# Patient Record
Sex: FEMALE | Race: WHITE | HISPANIC OR LATINO | Employment: UNEMPLOYED | URBAN - METROPOLITAN AREA
[De-identification: names, ages, dates, MRNs, and addresses within clinical notes are randomized per-mention and may not be internally consistent; named-entity substitution may affect disease eponyms.]

---

## 2021-03-24 ENCOUNTER — OFFICE VISIT (OUTPATIENT)
Dept: FAMILY MEDICINE CLINIC | Facility: CLINIC | Age: 31
End: 2021-03-24

## 2021-03-24 VITALS
DIASTOLIC BLOOD PRESSURE: 82 MMHG | WEIGHT: 134.8 LBS | RESPIRATION RATE: 18 BRPM | TEMPERATURE: 98.2 F | BODY MASS INDEX: 23.01 KG/M2 | OXYGEN SATURATION: 99 % | SYSTOLIC BLOOD PRESSURE: 130 MMHG | HEART RATE: 96 BPM | HEIGHT: 64 IN

## 2021-03-24 DIAGNOSIS — Z00.00 ANNUAL PHYSICAL EXAM: Primary | ICD-10-CM

## 2021-03-24 PROCEDURE — 99385 PREV VISIT NEW AGE 18-39: CPT | Performed by: FAMILY MEDICINE

## 2021-03-24 NOTE — Clinical Note
Freeman Blair- This is the pt I was talking about  She is Polish-speaking only and needs to schedule an appointment ASAP for nexplanon removal  She may also need a note for work for this appointment  I gave her one for tomorrow in the hopes it could be scheduled for then, but if not she may need a new note  Thank you!

## 2021-03-24 NOTE — PATIENT INSTRUCTIONS

## 2021-03-24 NOTE — LETTER
March 24, 2021     Patient: Farrukh Bassett [address] [place] [address]   YOB: 1990   Date of Visit: 3/24/2021       To Whom it May Concern:    Alyssa Meyers is under my professional care  She was seen in my office on 3/24/2021  She will need to be excused for another procedure appointment at some point within the next week  Hopefully to be scheduled on 3/25/2021  Please excuse her for this appointment  Thank you  If you have any questions or concerns, please don't hesitate to call           Sincerely,          Filemon Thurston MD        CC: No Recipients

## 2021-03-24 NOTE — PROGRESS NOTES
[address] [facility] [place]    NAME: Jeannie Bailon  AGE: 33 y o  SEX: female  : 1990     DATE: 3/24/2021     Assessment and Plan:     Problem List Items Addressed This Visit     None      Visit Diagnoses     Annual physical exam    -  Primary          Immunizations and preventive care screenings were discussed with patient today  Patient states she is up to date on vaccinations but unfortunately does not have previous records  Appropriate education was printed on patient's after visit summary  Counseling:  Alcohol/drug use: discussed moderation in alcohol intake, the recommendations for healthy alcohol use, and avoidance of illicit drug use  Dental Health: discussed importance of regular tooth brushing, flossing, and dental visits  Injury prevention: discussed safety/seat belts, safety helmets, smoke detectors, carbon dioxide detectors, and smoking near bedding or upholstery  Sexual health: discussed sexually transmitted diseases, partner selection, use of condoms, avoidance of unintended pregnancy, and contraceptive alternatives  · Exercise: the importance of regular exercise/physical activity was discussed  Recommend exercise 3-5 times per week for at least 30 minutes  Depression Screening and Follow-up Plan: Patient's depression screening was positive with a PHQ-2 score of 0  Clincally patient does not have depression  No treatment is required  No follow-ups on file  Chief Complaint:     No chief complaint on file  History of Present Illness:     Adult Annual Physical   New patient here for a comprehensive physical exam  She is Polish-speaking only   phone was used  The patient reports that she wants nexplanon removed  Has had it in for 3 years and the device is starting to hurt in her arm  Wants to start having children   Discussed that she will need to schedule a procedure appointment with a trained provider to have it removed  Pts hopes that this appointment can be made as soon as possible  Diet and Physical Activity  · Diet/Nutrition: well balanced diet  · Exercise: walking and 5-7 times a week on average  Depression Screening  PHQ-9 Depression Screening    PHQ-9:   Frequency of the following problems over the past two weeks:      Little interest or pleasure in doing things: 0 - not at all  Feeling down, depressed, or hopeless: 0 - not at all  PHQ-2 Score: 0       General Health  · Sleep: gets 7-8 hours of sleep on average  · Hearing: normal - bilateral   · Vision: no vision problems  · Dental: regular dental visits and brushes teeth twice daily  /GYN Health  · Last menstrual period: currently  Irregular, every 3-6 months since nexplanon placement   · Contraceptive method: nexplanon, wants it removed because wants to conceive   · History of STDs?: no      Review of Systems:     Review of Systems   Constitutional: Negative for activity change, appetite change and fever  HENT: Negative for congestion, rhinorrhea, sneezing and sore throat  Respiratory: Negative for cough, chest tightness and shortness of breath  Cardiovascular: Negative for chest pain, palpitations and leg swelling  Gastrointestinal: Negative for abdominal pain, diarrhea, nausea and vomiting  Genitourinary: Negative for difficulty urinating and dysuria  Musculoskeletal: Negative for back pain and myalgias  Skin: Negative for color change, rash and wound  Neurological: Negative for dizziness, light-headedness and headaches  Psychiatric/Behavioral: Negative for agitation and confusion  Past Medical History:     No past medical history on file  Past Surgical History:     No past surgical history on file  Social History:     No existing history information found  No existing history information found      Social History     Socioeconomic History    Marital status: /Civil Union     Spouse name: Not on file    Number of children: Not on file    Years of education: Not on file    Highest education level: Not on file   Occupational History    Not on file   Social Needs    Financial resource strain: Not on file    Food insecurity     Worry: Not on file     Inability: Not on file    Transportation needs     Medical: Not on file     Non-medical: Not on file   Tobacco Use    Smoking status: Not on file   Substance and Sexual Activity    Alcohol use: Not on file    Drug use: Not on file    Sexual activity: Not on file   Lifestyle    Physical activity     Days per week: Not on file     Minutes per session: Not on file    Stress: Not on file   Relationships    Social connections     Talks on phone: Not on file     Gets together: Not on file     Attends Pentecostalism service: Not on file     Active member of club or organization: Not on file     Attends meetings of clubs or organizations: Not on file     Relationship status: Not on file    Intimate partner violence     Fear of current or ex partner: Not on file     Emotionally abused: Not on file     Physically abused: Not on file     Forced sexual activity: Not on file   Other Topics Concern    Not on file   Social History Narrative    Not on file      Family History:     No family history on file  Current Medications:     No current outpatient medications on file  No current facility-administered medications for this visit  Allergies:     Not on File   Physical Exam:     /82 (BP Location: Left arm, Patient Position: Sitting, Cuff Size: Standard)   Pulse 96   Temp 98 2 °F (36 8 °C) (Temporal)   Resp 18   Ht 5' 3 7" (1 618 m)   Wt 61 1 kg (134 lb 12 8 oz)   SpO2 99%   BMI 23 36 kg/m²     Physical Exam  Vitals signs reviewed  Constitutional:       General: She is not in acute distress  Appearance: Normal appearance  She is normal weight   She is not ill-appearing or toxic-appearing  HENT:      Head: Normocephalic and atraumatic  Right Ear: External ear normal       Left Ear: External ear normal       Nose: Nose normal    Eyes:      Extraocular Movements: Extraocular movements intact  Conjunctiva/sclera: Conjunctivae normal    Neck:      Musculoskeletal: Normal range of motion and neck supple  Cardiovascular:      Rate and Rhythm: Normal rate and regular rhythm  Pulses: Normal pulses  Heart sounds: Normal heart sounds  No murmur  Pulmonary:      Effort: Pulmonary effort is normal  No respiratory distress  Breath sounds: Normal breath sounds  No wheezing  Abdominal:      General: Abdomen is flat  Bowel sounds are normal       Palpations: Abdomen is soft  Tenderness: There is no abdominal tenderness  Musculoskeletal: Normal range of motion  Right lower leg: No edema  Left lower leg: No edema  Skin:     General: Skin is warm and dry  Capillary Refill: Capillary refill takes less than 2 seconds  Findings: No rash  Neurological:      General: No focal deficit present  Mental Status: She is alert and oriented to person, place, and time  Mental status is at baseline  Psychiatric:         Mood and Affect: Mood normal          Behavior: Behavior normal          Thought Content:  Thought content normal          Patricia Mccurdy MD   [address]

## 2021-03-25 ENCOUNTER — TELEPHONE (OUTPATIENT)
Dept: FAMILY MEDICINE CLINIC | Facility: CLINIC | Age: 31
End: 2021-03-25

## 2021-03-25 ENCOUNTER — PROCEDURE VISIT (OUTPATIENT)
Dept: FAMILY MEDICINE CLINIC | Facility: CLINIC | Age: 31
End: 2021-03-25

## 2021-03-25 VITALS
HEART RATE: 98 BPM | RESPIRATION RATE: 18 BRPM | OXYGEN SATURATION: 99 % | DIASTOLIC BLOOD PRESSURE: 70 MMHG | SYSTOLIC BLOOD PRESSURE: 110 MMHG | TEMPERATURE: 97.8 F | WEIGHT: 134.6 LBS | BODY MASS INDEX: 22.98 KG/M2 | HEIGHT: 64 IN

## 2021-03-25 DIAGNOSIS — Z30.46 NEXPLANON REMOVAL: Primary | ICD-10-CM

## 2021-03-25 PROCEDURE — 24200 RMVL FB UPPER ARM/ELBW SUBQ: CPT | Performed by: FAMILY MEDICINE

## 2021-03-25 NOTE — PROGRESS NOTES
Universal Protocol:  Consent: Verbal consent obtained  Consent given by: patient  Time out: Immediately prior to procedure a "time out" was called to verify the correct patient, procedure, equipment, support staff and site/side marked as required  Timeout called at: 3/25/2021 2:50 PM   Patient identity confirmed: verbally with patient    Foreign body removal    Date/Time: 3/25/2021 3:08 PM  Performed by: Luz Marina Baird MD  Authorized by: Luz Marina Baird MD   Body area: skin  General location: upper extremity  Location details: left upper arm    Anesthesia:  Local Anesthetic: lidocaine 1% with epinephrine  Anesthetic total: 3 mL    Sedation:  Patient sedated: no  Patient cooperative: yes  Depth: subcutaneous  Complexity: simple  1 objects recovered  Objects recovered: 1 nexplanon implant, 4 cm in length  Post-procedure assessment: foreign body removed  Patient tolerance: patient tolerated the procedure well with no immediate complications      Vitals:    03/25/21 1508   BP: 110/70   Pulse: 98   Resp: 18   Temp: 97 8 °F (36 6 °C)   SpO2: 99%         LINDSEY Albarado  Family Medicine, PGY-3    Please excuse any "sound-alike" errors that may have ocurred during the process of dictation  Parts of this note have been dictated and there may be errors present in the transcription process  Thank you

## 2021-03-25 NOTE — TELEPHONE ENCOUNTER
----- Message from Jose Tate MD sent at 3/24/2021  2:22 PM EDT -----  Please schedule patient for procedure appointment for nexplanon removal as early as tomorrow

## 2021-07-20 ENCOUNTER — OFFICE VISIT (OUTPATIENT)
Dept: OBGYN CLINIC | Facility: CLINIC | Age: 31
End: 2021-07-20

## 2021-07-20 VITALS
HEART RATE: 82 BPM | SYSTOLIC BLOOD PRESSURE: 124 MMHG | DIASTOLIC BLOOD PRESSURE: 83 MMHG | WEIGHT: 136 LBS | BODY MASS INDEX: 23.22 KG/M2 | HEIGHT: 64 IN

## 2021-07-20 DIAGNOSIS — N76.0 ACUTE VAGINITIS: ICD-10-CM

## 2021-07-20 DIAGNOSIS — R30.0 DYSURIA: Primary | ICD-10-CM

## 2021-07-20 LAB
SL AMB  POCT GLUCOSE, UA: ABNORMAL
SL AMB LEUKOCYTE ESTERASE,UA: ABNORMAL
SL AMB POCT BILIRUBIN,UA: ABNORMAL
SL AMB POCT BLOOD,UA: ABNORMAL
SL AMB POCT CLARITY,UA: ABNORMAL
SL AMB POCT COLOR,UA: ABNORMAL
SL AMB POCT KETONES,UA: ABNORMAL
SL AMB POCT NITRITE,UA: ABNORMAL
SL AMB POCT PH,UA: 5
SL AMB POCT SPECIFIC GRAVITY,UA: 1.02
SL AMB POCT URINE PROTEIN: ABNORMAL
SL AMB POCT UROBILINOGEN: NORMAL

## 2021-07-20 PROCEDURE — 87086 URINE CULTURE/COLONY COUNT: CPT | Performed by: STUDENT IN AN ORGANIZED HEALTH CARE EDUCATION/TRAINING PROGRAM

## 2021-07-20 PROCEDURE — 99203 OFFICE O/P NEW LOW 30 MIN: CPT | Performed by: OBSTETRICS & GYNECOLOGY

## 2021-07-20 PROCEDURE — 81002 URINALYSIS NONAUTO W/O SCOPE: CPT | Performed by: OBSTETRICS & GYNECOLOGY

## 2021-07-21 LAB — BACTERIA UR CULT: NORMAL

## 2021-08-03 NOTE — PROGRESS NOTES
ASSESSMENT & PLAN: Lashaun Boston is a 33 y o  No obstetric history on file  with normal gynecologic exam     1   Routine well woman exam done today  2  Pap and HPV:  The patient's last pap and hpv was 3 years ago  It was normal    Denies abnormal pap hx  Pap and cotesting was done today  Current ASCCP Guidelines reviewed  Will call results  3   The following were reviewed in today's visit: breast self exam, STD testing, adequate intake of calcium and vitamin D, exercise and healthy diet  4  Desires to get pregnant, to begin daily multivitamin with folic acid  CC:  Annual Gynecologic Examination    HPI: Lashaun Boston is a 33 y o  No obstetric history on file  who presents for annual gynecologic examination  [employer] Inter preter [id number]  used  Patient recently seen for dysuria possible yeast infection  Her urine culture showed no growth  She denies any vaginal or urinary symptoms  Has been having crampy pain across lower abdomen for the past 2 weeks, LMP was 2021  Has not needed any pain medication  Nexplanon recently removed    She has the following concerns:  Had Nexplanon removed about 3 months ago, desires to get pregnant  History of hypertension during her delivery, history of  x2  One incision is vertical in the other is transverse    Health Maintenance:    She wears her seatbelt routinely  She does perform irregular monthly self breast exams  She feels safe at home  Past Medical History:   Diagnosis Date    Hypertension     with pregnancy    Migraine     blurry vision       Past Surgical History:   Procedure Laterality Date     SECTION         Past OB/Gyn History:  OB History        2    Para   2    Term   2            AB        Living   2       SAB        TAB        Ectopic        Multiple        Live Births                   Pt does not have menstrual issues    Are heavy since she had Nexplanon removed History of sexually transmitted infection: No   History of abnormal pap smears: No patient denies  Patient is currently sexually active  heterosexual   The current method of family planning is none  Family History   Problem Relation Age of Onset    No Known Problems Mother     Heart disease Father     No Known Problems Sister     No Known Problems Brother     Asthma Daughter     No Known Problems Daughter        Social History:  Social History     Socioeconomic History    Marital status: /Civil Union     Spouse name: Not on file    Number of children: Not on file    Years of education: Not on file    Highest education level: Not on file   Occupational History    Not on file   Tobacco Use    Smoking status: Never Smoker    Smokeless tobacco: Never Used   Vaping Use    Vaping Use: Never used   Substance and Sexual Activity    Alcohol use: Not Currently    Drug use: Never    Sexual activity: Yes     Partners: Male   Other Topics Concern    Not on file   Social History Narrative    Not on file     Social Determinants of Health     Financial Resource Strain: Low Risk     Difficulty of Paying Living Expenses: Not hard at all   Food Insecurity: No Food Insecurity    Worried About Running Out of Food in the Last Year: Never true    Ana María of Food in the Last Year: Never true   Transportation Needs: No Transportation Needs    Lack of Transportation (Medical): No    Lack of Transportation (Non-Medical):  No   Physical Activity:     Days of Exercise per Week:     Minutes of Exercise per Session:    Stress:     Feeling of Stress :    Social Connections:     Frequency of Communication with Friends and Family:     Frequency of Social Gatherings with Friends and Family:     Attends Adventist Services:     Active Member of Clubs or Organizations:     Attends Club or Organization Meetings:     Marital Status:    Intimate Partner Violence: Not At Risk    Fear of Current or Ex-Partner: No    Emotionally Abused: No    Physically Abused: No    Sexually Abused: No     Presently lives with family  Patient is   No Known Allergies    No current outpatient medications on file  Review of Systems  Constitutional :no fever, feels well, no tiredness, no recent weight gain or loss  ENT: no ear ache, no loss of hearing, no nosebleeds or nasal discharge, no sore throat or hoarseness  Cardiovascular: no complaints of slow or fast heart beat, no chest pain, no palpitations, no leg claudication or lower extremity edema  Respiratory: no complaints of shortness of shortness of breath, no SUMNER  Breasts:no complaints of breast pain, breast lump, or nipple discharge  Gastrointestinal: no complaints of abdominal pain, constipation, nausea, vomiting, or diarrhea or bloody stools  Genitourinary : no complaints of dysuria, incontinence, pelvic pain, no dysmenorrhea, vaginal discharge or abnormal vaginal bleeding and as noted in HPI  Musculoskeletal: no complaints of arthralgia, no myalgia, no joint swelling or stiffness, no limb pain or swelling    Integumentary: no complaints of skin rash or lesion, itching or dry skin  Neurological: no complaints of headache, no confusion, no numbness or tingling, no dizziness or fainting    Objective      /83 (BP Location: Left arm, Patient Position: Sitting, Cuff Size: Adult)   Pulse 79   Ht 5' 3 7" (1 618 m)   Wt 62 1 kg (137 lb)   LMP 07/13/2021 (Approximate)   BMI 23 74 kg/m²   General:   appears stated age, cooperative, alert normal mood and affect   Neck: normal, supple,trachea midline, no masses   Heart: regular rate and rhythm, S1, S2 normal, no murmur, click, rub or gallop   Lungs: clear to auscultation bilaterally   Breasts: normal appearance, no masses or tenderness, Inspection negative, No nipple retraction or dimpling, No nipple discharge or bleeding, No axillary or supraclavicular adenopathy, Normal to palpation without dominant masses, Taught monthly breast self examination   Abdomen: soft, non-tender, without masses or organomegaly  One vertical  scar and 1 transverse present on abdomen   Vulva: normal female genitalia, Bartholin's, Urethra, Vivian normal   Vagina: normal vagina, no discharge, exudate, lesion, or erythema   Urethra: normal   Cervix: Normal, no discharge  PAP done  GCC done  Nontender  Uterus: normal size, contour, position, consistency, mobility, non-tender and anteverted   Adnexa: no mass, fullness, tenderness   Lymphatic palpation of lymph nodes in neck, axilla, groin and/or other locations: no lymphadenopathy or masses noted   Skin normal skin turgor and no rashes     Psychiatric orientation to person, place, and time: normal  mood and affect: normal

## 2021-08-04 ENCOUNTER — ANNUAL EXAM (OUTPATIENT)
Dept: OBGYN CLINIC | Facility: CLINIC | Age: 31
End: 2021-08-04

## 2021-08-04 VITALS
WEIGHT: 137 LBS | DIASTOLIC BLOOD PRESSURE: 83 MMHG | HEIGHT: 64 IN | HEART RATE: 79 BPM | SYSTOLIC BLOOD PRESSURE: 124 MMHG | BODY MASS INDEX: 23.39 KG/M2

## 2021-08-04 DIAGNOSIS — Z01.419 WOMEN'S ANNUAL ROUTINE GYNECOLOGICAL EXAMINATION: Primary | ICD-10-CM

## 2021-08-04 DIAGNOSIS — Z72.51 HIGH RISK HETEROSEXUAL BEHAVIOR: ICD-10-CM

## 2021-08-04 PROCEDURE — 87491 CHLMYD TRACH DNA AMP PROBE: CPT | Performed by: NURSE PRACTITIONER

## 2021-08-04 PROCEDURE — 87591 N.GONORRHOEAE DNA AMP PROB: CPT | Performed by: NURSE PRACTITIONER

## 2021-08-04 PROCEDURE — G0145 SCR C/V CYTO,THINLAYER,RESCR: HCPCS | Performed by: NURSE PRACTITIONER

## 2021-08-04 PROCEDURE — G0476 HPV COMBO ASSAY CA SCREEN: HCPCS | Performed by: NURSE PRACTITIONER

## 2021-08-04 PROCEDURE — 99395 PREV VISIT EST AGE 18-39: CPT | Performed by: NURSE PRACTITIONER

## 2021-08-06 ENCOUNTER — OFFICE VISIT (OUTPATIENT)
Dept: FAMILY MEDICINE CLINIC | Facility: CLINIC | Age: 31
End: 2021-08-06

## 2021-08-06 VITALS
DIASTOLIC BLOOD PRESSURE: 72 MMHG | RESPIRATION RATE: 16 BRPM | HEART RATE: 83 BPM | TEMPERATURE: 98 F | HEIGHT: 63 IN | SYSTOLIC BLOOD PRESSURE: 118 MMHG | WEIGHT: 138.69 LBS | BODY MASS INDEX: 24.57 KG/M2 | OXYGEN SATURATION: 98 %

## 2021-08-06 DIAGNOSIS — Z02.89 ENCOUNTER FOR COMPLETION OF FORM WITH PATIENT: Primary | ICD-10-CM

## 2021-08-06 DIAGNOSIS — Z02.4 DRIVER'S PERMIT PE (PHYSICAL EXAMINATION): ICD-10-CM

## 2021-08-06 LAB
C TRACH DNA SPEC QL NAA+PROBE: NEGATIVE
HPV HR 12 DNA CVX QL NAA+PROBE: NEGATIVE
HPV16 DNA CVX QL NAA+PROBE: NEGATIVE
HPV18 DNA CVX QL NAA+PROBE: NEGATIVE
N GONORRHOEA DNA SPEC QL NAA+PROBE: NEGATIVE

## 2021-08-06 PROCEDURE — 99499 UNLISTED E&M SERVICE: CPT | Performed by: FAMILY MEDICINE

## 2021-08-06 NOTE — PROGRESS NOTES
Assessment/Plan:    Encounter for form completion   's form filled out today  Vision and PMH, chart reviewed with no contraindication to driving  69/69 vision with correction  Discussed with patient      Diagnoses and all orders for this visit:    Encounter for completion of form with patient    's permit PE (physical examination)        Subjective:      Patient ID: Brant Kamara is a 33 y o  female  HPI   Singaporean-speaking, used     34 yo female presenting for 's physical  Pt did not have form and is requesting form from office  Pt has no new symptoms or concerns today  Denies pain, neurologic disorder, weakness, cardiovascular disease, substance abuse  Chart reviewed  Pt denies changes in her vision, blurry vision  Is wearing glasses and states they work well  Vision checked at this visit 20/20 both eyes  The following portions of the patient's history were reviewed and updated as appropriate: allergies, current medications, past family history, past medical history, past social history, past surgical history and problem list     Review of Systems   Constitutional: Negative for chills and fever  HENT: Negative for congestion and rhinorrhea  Eyes: Negative for photophobia, redness and visual disturbance  Respiratory: Negative for shortness of breath  Cardiovascular: Negative for chest pain and palpitations  Gastrointestinal: Negative for abdominal pain  Musculoskeletal: Negative for myalgias  Skin: Negative for rash and wound  Neurological: Negative for dizziness, seizures, syncope, weakness, light-headedness and headaches  Psychiatric/Behavioral: Negative for agitation and confusion           Objective:    /72 (BP Location: Left arm, Patient Position: Sitting, Cuff Size: Standard)   Pulse 83   Temp 98 °F (36 7 °C) (Temporal)   Resp 16   Ht 5' 3" (1 6 m)   Wt 62 9 kg (138 lb 11 oz)   LMP 07/13/2021 (Approximate)   SpO2 98%   BMI 24 57 kg/m²        Physical Exam  Vitals reviewed  Constitutional:       General: She is not in acute distress  Appearance: She is well-developed  HENT:      Head: Normocephalic and atraumatic  Eyes:      Pupils: Pupils are equal, round, and reactive to light  Cardiovascular:      Rate and Rhythm: Normal rate and regular rhythm  Heart sounds: Normal heart sounds  No murmur heard  Pulmonary:      Effort: Pulmonary effort is normal  No respiratory distress  Breath sounds: Normal breath sounds  No wheezing or rales  Abdominal:      General: Bowel sounds are normal  There is no distension  Palpations: Abdomen is soft  Tenderness: There is no abdominal tenderness  Musculoskeletal:         General: No tenderness or deformity  Normal range of motion  Cervical back: Normal range of motion and neck supple  Skin:     General: Skin is warm and dry  Capillary Refill: Capillary refill takes less than 2 seconds  Findings: No rash  Neurological:      General: No focal deficit present  Mental Status: She is alert  Mental status is at baseline     Psychiatric:         Mood and Affect: Mood normal          Behavior: Behavior normal          Mathieu Tejeda MD, PGY2  [address] [facility]  Date: 8/6/2021 Time: 10:07 AM

## 2021-08-10 LAB
LAB AP GYN PRIMARY INTERPRETATION: NORMAL
Lab: NORMAL

## 2021-08-12 ENCOUNTER — TELEPHONE (OUTPATIENT)
Dept: OBGYN CLINIC | Facility: CLINIC | Age: 31
End: 2021-08-12

## 2021-08-12 NOTE — TELEPHONE ENCOUNTER
----- Message from Fidelina Stratton, [address] sent at 8/10/2021 10:14 PM EDT -----   Pap and HPV Result is negative, please call patient to inform

## 2021-08-16 ENCOUNTER — TELEPHONE (OUTPATIENT)
Dept: OBGYN CLINIC | Facility: CLINIC | Age: 31
End: 2021-08-16

## 2021-09-07 ENCOUNTER — ULTRASOUND (OUTPATIENT)
Dept: OBGYN CLINIC | Facility: CLINIC | Age: 31
End: 2021-09-07

## 2021-09-07 VITALS
BODY MASS INDEX: 24.27 KG/M2 | HEART RATE: 87 BPM | HEIGHT: 63 IN | WEIGHT: 137 LBS | SYSTOLIC BLOOD PRESSURE: 119 MMHG | DIASTOLIC BLOOD PRESSURE: 75 MMHG

## 2021-09-07 DIAGNOSIS — Z34.91 PRENATAL CARE IN FIRST TRIMESTER: Primary | ICD-10-CM

## 2021-09-07 PROCEDURE — 99213 OFFICE O/P EST LOW 20 MIN: CPT | Performed by: OBSTETRICS & GYNECOLOGY

## 2021-09-07 RX ORDER — PNV NO.95/FERROUS FUM/FOLIC AC 28MG-0.8MG
1 TABLET ORAL DAILY
Qty: 30 TABLET | Refills: 3 | Status: SHIPPED | OUTPATIENT
Start: 2021-09-07 | End: 2022-03-08 | Stop reason: SDUPTHER

## 2021-09-07 RX ORDER — LANOLIN ALCOHOL/MO/W.PET/CERES
400 CREAM (GRAM) TOPICAL DAILY
Qty: 30 TABLET | Refills: 3 | Status: SHIPPED | OUTPATIENT
Start: 2021-09-07

## 2021-09-08 ENCOUNTER — PATIENT OUTREACH (OUTPATIENT)
Dept: OBGYN CLINIC | Facility: CLINIC | Age: 31
End: 2021-09-08

## 2021-09-08 ENCOUNTER — INITIAL PRENATAL (OUTPATIENT)
Dept: OBGYN CLINIC | Facility: CLINIC | Age: 31
End: 2021-09-08

## 2021-09-08 VITALS
HEIGHT: 64 IN | HEART RATE: 97 BPM | BODY MASS INDEX: 23.32 KG/M2 | DIASTOLIC BLOOD PRESSURE: 76 MMHG | SYSTOLIC BLOOD PRESSURE: 115 MMHG | WEIGHT: 136.6 LBS

## 2021-09-08 DIAGNOSIS — Z34.91 PRENATAL CARE IN FIRST TRIMESTER: Primary | ICD-10-CM

## 2021-09-08 DIAGNOSIS — O09.299 HISTORY OF PRE-ECLAMPSIA IN PRIOR PREGNANCY, CURRENTLY PREGNANT: ICD-10-CM

## 2021-09-08 PROCEDURE — OBC

## 2021-09-08 NOTE — LETTER
Dentist Letter    Neema Martins  1990  [address]  [address] [facility] [address]          09/08/21    We have had several requests from local dentist requesting permission to perform procedures on our patients who are pregnant  We wish to respond with this letter regarding some of the more routine procedures that we have been asked about  The following procedures may be performed on our obstetric patients:   1  Administration of local anesthesia   2  Administration of antibiotics such as PCN, Ampicillin, and Erythromycin  3  Administration of pain medications such as Tylenol, Tylenol with codeine, and if needed Percocet  4  Shielded X-rays    Should you have any questions, please do not hesitate to contact at [phone]          Sincerely,    1 [facility]   [phone]

## 2021-09-08 NOTE — LETTER
Work Letter    Neema Garcia Wm  1990  [address]  [address] [place] [address]    Dear Cj Branch,      09/08/21        Your employee is a patient at [employer]  We recommend that all pregnant women:    1  Have a well-ventilated workspace  2  Wear low-heeled shoes  3  Work no more than 40 hours per week  4  Have a 15 minute break every 2 hours and at least 30 minutes for a meal break  5  Use good body mechanics by bending at your knees to avoid back strain and lift no more than 20 pounds without assistance  Will need assistance with lifting over 20 lbs  6  Have ready access to bathrooms and water  She may continue to work until her due date unless medical complications arise  We anticipate she may return to work in 6-8 weeks after delivery       Sincerely,  1 [facility]

## 2021-09-08 NOTE — LETTER
Proof of Pregnancy Letter    Neema Figueredo  1990  [address]  [facility] [address]        09/08/21      Neema Figueredo is a patient at our facility  Labrionna Calvinr Estimated Date of Delivery: 4/13/2022  Any questions or concerns, please feel free to contact our office      Sincerely,    1 [facility]    [address]   [place], [address] [facility]   [phone]

## 2021-09-08 NOTE — PROGRESS NOTES
OB INTAKE INTERVIEW  Pt presents for OB intake via  [facility]  [id number]  OB History    Para Term  AB Living   3 2 1 1   2   SAB TAB Ectopic Multiple Live Births           2      # Outcome Date GA Lbr Merrick/2nd Weight Sex Delivery Anes PTL Lv   3 Current            2 Term 18 38w0d   F CS-Unspec EPI  RETA      Complications: Pre-eclampsia   1  06   1588 g (3 lb 8 oz) F CS-Unspec EPI  RETA      Complications: Pre-eclampsia     Hx of  delivery prior to 36 weeks 6 days:  Yes   If yes, place a referral for cervical surveillance at 16 weeks  Last Menstrual Period:   Patient's last menstrual period was 2021  Ultrasound date: 2021  8 weeks 6 days     Estimated Date of Delivery: 2022  by LMP   H&P visit scheduled  2021 @ 21   with Dr Sherin Baird   Last pap smear: 2021  Findings; lab pap smear results: no abnormalities    Current Issues:  Constipation :   No  Headaches :   Yes, 2 weeks ago and relieved with Advil  RN educated pt about medication during prgenancy  Cramping:  Yes, at times but not currently    Spotting :   No  PICA cravings :  No  FOB Involved:   Yes  Planned pregnancy:  Yes  I have these concerns about this prenatal patient:   History of pre-term labor - referral placed for MFM (cervical surveillance)  History of pre-eclampsia in previous pregnancies - ordered protein/creatinine ratio, and CMP  FOB adopted - no family history and pt's niece born with hydrocephalus (Pt refused all genetic testing) Pt will discuss with  and will discuss with provider at H&P  History of previous C-sections in  and [place] - ordered hemoglobin electrophoresis   Pt administered Gesemet for nausea - RN educated pt about medication for nausea   No immunizations on file - ordered varicella titer   Ordered prenatal panel  Referral placed for [facility]  Interview education   [facility] Pregnancy Essentials reviewed and discussed    [facility] Handout  [facility] Handout   Discussed genetic testing - pt refused genetic testing at this time    Prenatal lab work: Scripts printed and given to pt   Influenza vaccine given today: N/A   Discussed COVID vaccine - provided pt with [facility] # to schedule vaccine   Discussed Tdap vaccine  Immunizations: There is no immunization history on file for this patient  Depression Screening Follow-up Plan: Patient's depression screening was negative with an [place] score of  1  Clinically patient does not have depression  No treatment is required     Nurse/Family Partnership- referral placed:  N/A   If yes, place referral for nurse family partnership  BMI Counseling: Body mass index is 23 45 kg/m²  Tobacco Cessation Counseling: non-smoker  The numerous health risks of tobacco consumption were discussed  Infection Screening: Does the pt have a hx of MRSA? No  If yes- please follow MRSA protocol and obtain a nasal swab for MRSA culture  The patient was oriented to our practice and all questions were answered    Interviewed by: Aaron Chapa RN 09/08/21

## 2021-09-08 NOTE — PROGRESS NOTES
JAVIER POTTER met with 34 y/o- M- G3:P2-  Burmese speaking woman for pre  assessment  Pt resides with her spouse and 2 daughters  Reported pregnancy was intended and both are happy  Pt denies any usage of drug, alcohol or smoking  Pt denies any mental health or domestic violence history  Pt is applying for MA, currently on [employer]  Pt will call [phone] for appointment  Pt normally walks to the appointments or Spouse will drive her  Pt has limited support here  Spouse is her main support since her family resides in   Pt denies questions or concern at this time  JAVIER POTTER explained her role and gave contact information  Pt will call JAVIER Potter at any time needed  JAVIER POTTER assisted with interpretation during pre joshua interview  Pt will RTC in 4 weeks and will get pre  labs done prior to the appointment

## 2021-09-08 NOTE — LETTER
[facility] Letter    Neema Luz  1990  [id number] [facility]       09/08/21          Neema Luz is a patient and under our care in our office  Neema Luz Estimated Date of Delivery: 4/13/2022  Any questions or concerns feel free to contact our office       Thank you,  [address]  [id number] [facility]/[address]  [facility] [place]  [facility] [address] [place] [address]/[address]  [phone]

## 2021-09-08 NOTE — PATIENT INSTRUCTIONS
[place] 1925 ¡Felicitaciones por Belen Bowden! Nos complace que nos haya elegido para ser hernandez proveedor de lynsey servicios de lanette médica chandler hernandez Alfrieda Mins  Hernandez lanette, la lanette de hernandez hijo por nacer (niños) y hernandez rachid son importante para nosotros  Ahora, más que [place], hernandez lanette será lo más importante para usted  El crecimiento y el progreso de hernandez bebé pueden verse afectados por la forma en que usted se cuide  Es Jany Wilkerson kamryn idea planificar con anticipación  Es un hecho conocido que las mujeres que reciben atención mèdica desde temprano en  [place] y chandler todo el embarazo tienen bebés más saludables  Se programarán visitas para usted chandler todo [place]  Es hernandez deber cumplir con lynsey citas y seguir las instrucciones para hernandez cuidado  El [place] de visitas será decidido por hernandez médico  No tengas miedo de hacer preguntas  Hable con lynsey enfermeras y proveedores sobre lynsey planes de parto y cualquier inquietud que pueda tener  Anthonycassy Ramirez de Verificación  ; Medicina Materno Fetal (MFM) al [phone] para programar hernandez zohra   o Zohra de 1 hora, solo se permite 1 persona de apoyo, NO niños    ; Análisis de tyree: complete antes de hernandez zohra de H&P   NO tiene que ayunar para ningún análisis de tyree a menos que se lo indiquen  - CBC, Tipo de Bruneric Swift y [address] de anticuerpos, Análisis de Philipplucian, New brunmaureen de glucosa al davion, VIH, sífilis, hepatitis B    ; Alvenia Counter y física: zohra de H&P   examen físico   Examen pélvico: Zeyad Olivo y Clamidia   Si es necesario: Papanicolaou    Plan:  ; Visitas prenatales de rutina cada 4 semanas hasta las 28 semanas   En lynsey visitas prenatales:  - Monitoreo de los el latidos del corazón del bebé y medir hernandez keely en crecimiento, Recolecte declan [facility], y se tomara hernandez peso y presión arterial      Señales de Alerta en el embarazo: Ashley Chowdary  [phone]    1  Sangrado vaginal  2   Dolor abdominal lobito que no desaparece  3  Fiebre (más de 100 4 y no se miguel con Tylenol)  4  Vómitos persistentes que tomlinson más de 24 horas  5  dolor de pecho  6  Dolor o ardor al orinar  7  Dolor de heriberto severo que no se resuelve con Tylenol  8  Visión borrosa o sara puntos en nair visión  9  Hinchazón repentina de nair giovana o kristina  10  Enrojecimiento, hinchazón o dolor en declan pierna  11  Un aumento de peso repentino en pocos días  12  Contar los movimientos fetales del bebé  (después de 28 semanas o el sexto mes de embarazo)  15  Declan pérdida de líquido acuoso de la vagina: puede ser un chorro, un goteo o declan humedad continua  14  Después de 20 semanas de embarazo, calambres rítmicos (más de 4 por hora) o menstruales elle dolor bajo / pélvico      Manténgase saludable chandler nair embarazo:   · Consuma alimentos saludables y variados  Alimentos saludables incluyen frutas, verduras, panes de feroz integral, alimentos lácteos bajos en grasa, frijoles, portia magras y pescado  [place] líquidos elle se le haya indicado  Pregunte cuánto líquido debe ewa cada día y cuáles líquidos son los más adecuados para usted  o Cafeína: no está jad cómo la cafeína afecta el embarazo  Limite nair consumo de cafeína para evitar posibles problemas de lanette   - Limite la cafeína a menos de 200 miligramos por día  - La cafeína se puede encontrar en el café, el té, la cola, las bebidas deportivas y el chocolate  o  Alimentos que contienen arabella: el arabella se encuentra naturalmente en jeff todos los tipos de pescados y mariscos  Algunos tipos de peces absorben niveles más altos de arabella que pueden ser dañinos para un bebé miko  Coma solo pescado y mariscos bajos en arabella  - Limite nair consumo de pescado a 2 porciones por semana    - Elija pescado con bajo contenido de arabella, elle atún jad enlatado, camarones, cangrejo, salmón, bacalao o tilapia   o No coma pescado con alto contenido de [employer] pez madi, el pez Matt brewer, la caballa real y el tiburón  - Cada semana, puede comer hasta 12 onzas de pescado o mariscos que tienen bajos niveles de [employer]  Estos incluyen camarones, atún jad enlatado, salmón, abadejo y [place]  o Coma solo 6 onzas de atún chapa (chapa) por semana  El atún chapa tiene más arabella que el atún [place]  · [address] [place]  Nair necesidad de ciertas vitaminas y [facility], elle el ácido fólico, aumenta chandler el [facility]  Las vitaminas prenatales proporcionan algunas de las vitaminas y minerales adicionales que usted necesita  Las vitaminas prenatales también podrían ayudar a disminuir el riesgo de ciertos defectos de nacimiento  · Pregunte cuánto peso usted debe aumentar chandler nair embarazo  Demasiado aumento de peso o muy poco puede ser poco saludable para usted y nair bebé  · Ejercicio: hable con nair proveedor de [employer] ejercicio  El ejercicio moderado puede ayudarlo a mantenerse en forma  Nair proveedor de [employer] ayudará a planificar un programa de ejercicios que sea seguro para usted chandler el [facility]  · Therese muchos líquidos mientras hace ejercicio para mantenerse hidratado  Tenga cuidado para evitar el sobrecalentamiento  o EVITE los ejercicios que pueden hacer que pierda el equilibrio   o Actividades que pueden poner a nair bebé en riesgo, es decir, montar a lyndon, bucear, esquiar o hacer snowboard  o Después de nair primer trimestre, evite los ejercicios que requieren que se acueste boca arriba   o EVITE exceder declan frecuencia cardíaca mayor de 140 latidos por minuto  Siempre que pueda mantener declan conversación mientras hace ejercicio, es probable que nair ritmo cardíaco sea aceptable   ; Siempre use el cinturón de seguridad   El cinturón de hombro debe estar sobre el pecho (entre los senos) y lejos del michelle     Asegure el cinturón de regazo debajo de nair vientre para que se ajuste cómodamente en lynsey caderas y Mick pélvico   ; Realizar el ejercicio de Kegel   Imagínese deteniendo el flujo de orina, contrayendo los músculos de nair piso pélvico  Mantenga michael contracción chandler 10 segundos y suelte   Se pueden repetir 10-20 veces por día  · No fume  Si usted fuma, nunca es demasiado tarde para dejar de hacerlo  Fumar aumenta el riesgo de aborto espontáneo y otros problemas de lanette chandler nair [place]  Fumar puede causar que nair bebé nazca antes de tiempo o que pese menos al nacer  Solicite información a nair médico si usted necesita ayuda para dejar de fumar  · No consuma alcohol  El alcohol pasa de nair cuerpo al bebé a través de la placenta  Puede afectar el desarrollo del cerebro de nair bebé y provocar el síndrome de alcoholismo fetal (SAF)  SAF es un veronica de condiciones que causan [address], de comportamiento y de crecimiento  · Consulte con nair médico antes de ewa cualquier medicamento  Muchos medicamentos pueden perjudicar a nair bebé si usted los ga [address]  No tome ningún medicamento, vitaminas, hierbas o suplementos sin daniela consultar con nair médico    · Nunca  use drogas ilegales o de la molina (elle marihuana o cocaína) mientras está embarazada  Consejos de seguridad:   · Evite jacuzzis y saunas  No use un jacuzzi o un sauna mientras usted está embarazada, especialmente chandler el primer trimestre  Los [facility] y los saunas aumentan la temperatura de nair bebé y el riesgo de defectos de nacimiento  · Evite la toxoplasmosis  [place] es declan infección causada por comer carne cruda o estar cerca del excremento de un cachorro infectado  [place] puede causar malformaciones congénitas, aborto espontáneo y Robin Wellsese las kristina después de tocar carne cruda  Asegúrese de que la carne esté eulalia cocida antes de comerla  Evite los huevos crudos y la Chip Ryder  Use guantes o pida que alguien la ayude a limpiar la caja de arena del cachorro mientras byron Sanchez     · Consulte con nair médico acerca de viajar  El [address] cómodo para viajar es chandler el oli trimestre  Pregunte a nair médico si usted puede viajar después de las 36 semanas  Es posible que no pueda viajar en avión después de las [address]  También le puede recomendar que evite largos viajes por carretera  Programe un control con nair médico u obstetra según lo indicado:  Vaya a todas lynsey citas prenatales chandler nair embarazo  Anote lynsey preguntas para que se acuerde de hacerlas chandler lynsey visitas  [place] y vómito en el embarazo       CUIDADO AMBULATORIO:   La náusea y el vómito en el embarazo  pueden suceder a cualquier hora del día  Estos síntomas usualmente comienzan antes de la semana 9 del embarazo y terminan para la semana 14 (oli trimestre)  Algunas mujeres pueden tener náusea o vómito por un tiempo prolongado  Estos síntomas pueden afectar a algunas mujeres chandler el embarazo  La náusea y el vómito no dañan a nair bebé  Estos síntomas pueden dificultarle lynsey actividades diarias  Pregúntele a nair [employer] vitaminas y minerales son adecuados para usted  · Usted vomita más de 4 veces en 1 día  · Usted no ha podido retener líquidos en el estómago por más de 1 día  · Usted pierde más de 2 libras  · Usted tiene fiebre  · Lynsey náuseas y vómito continúan por más de 14 semanas  · Usted tiene preguntas o inquietudes acerca de nair condición o cuidado  El tratamiento  para la náusea y el vómito en el embarazo generalmente no es necesario  Usted puede [employer] alimentos que come y en lynsey actividades para ayudar a controlar lynsey síntomas  Es posible que usted necesite probar varias cosas para determinar qué funciona mejor para usted  Hable con nair médico si lynsey síntomas no mejoran con los cambios que se recomiendan a continuación  Es posible que usted necesite vitamina B6 y medicamento si estos cambios no ayudan o si lynsey síntomas se vuelven graves     Cambios de nutrición que usted puede realizar para controlar la náusea y el vómito:   · Coma porciones pequeñas chandler el día en vez de 3 comidas con porciones grandes  Es más probable que usted tenga náusea y vómito cuando nair estómago está vacío  Consuma alimentos bajos en grasa y ricos en proteínas  Ejemplos son Dana Kil, frijoles, pavo y key sin praful Goldman, elle galletas saladas, cereal seco o un sandwich chico antes de [place]  · Coma galletas saladas o pan scott antes de levantarse de nair cama por la mañana  Levántese de la cama lentamente  Los movimientos repentinos podrían provocarle mareos y [place]  · Consuma alimentos blandos cuando se sienta con náuseas  Ejemplos de alimentos blandos son el pan scott, cereal seco, pasta sin Genet Bees y magaña  Otros alimentos blandos incluyen a las [employer], plátanos, gelatina y pretzels  Evite los alimentos condimentados, grasosos y fritos  Evite otros alimentos que le provoquen náuseas  · Darden líquidos que contengan gengibre  Darden refresco de gengibre hecho con gengibre real o té de gengibre hecho con gengibre fresco rallado  Las [employer] o dulces de gengibre también podrían ayudar a aliviar la náusea y el vómito  · Darden líquidos entre alimentos en vez de tomarlos con los alimentos  Espere al menos 30 minutos después de comer para ewa líquidos  Darden cantidades pequeñas de líquidos con frecuencia chandler el día para evitar la deshidratación  Consulte cuál es la cantidad de líquido que usted debería consumir al día  Otros cambios que usted puede realizar para controlar la náusea y el vómito:   · Evite los olores que la [place]  Los olores aimee podrían provocar que [employer] náuseas y el vómito, o podrían empeorarlo  Camine un poco, prenda un ventilador o trate de dormir con la ventana abierta para respirar aire fresco  Cuando esté cocinando, gino las ventanas para eliminar el olor que podría provocarle náuseas    · No se cepille lynsey dientes inmediatamente después de comer  si eso le provoca náuseas  · Descanse cuando lo necesite  Comience declan actividad lentamente y vuelva a nair rutina normal conforme se empiece a sentir mejor  · Hable con nair médico acerca de las vitaminas prenatales  Las vitaminas prenatales pueden provocar náuseas a algunas mujeres  Trate de tomárselas por la noche o con un bocadillo  Si karen cambio no le [facility], nair médico podría recomendarle un tipo de vitamina diferente  · No use ningún medicamento, vitamina o suplemento para controlar lynsey síntomas sin antes consultarlo con nair médico   Varios medicamentos pueden dañar a nair bebé que no ha nacido  · El ejercicio de ligero a moderado  podría ayudar a aliviar lynsey síntomas  También podría ayudarla a dormir mejor por la noche  Pregunte a nair médico acerca del mejor plan de ejercicio para usted  Beneficios de la lactancia materna   son menos propensos a desarrollar alergias   Tienen declan mayor protección contra la meningitis bacteriana   Tienen menos infecciones del oído medio   Tienen menos dificultades de aprendizaje    Tienen menos dificultades de comportamiento   Tienen un coeficiente intelectual más alto   Tienen tasas más bajas de enfermedades respiratorias   Tienen mauricio obesidad    Son menos propensos a desarrollar diabetes juvenil y algunos cánceres infantiles   Tienen menos probabilidades de morir por Síndrome de ertPAM [facility]   Un bebé más saludable significa menos visitas al médico y a la farmacia   La lactancia materna es ecológica  No hay nada que tirar   La lactancia materna es gratis; La fórmula puede costar más de $ 1000 chandler el primer año de rené   Hay menos sangrado vaginal inmediatamente después del parto y un retorno más rápido a nair tamaño anterior al [place]  Las [place] que amamantan chandler un total de 2 años tienen  ;  Declan disminución del 40% en el riesgo de cáncer de seno  ; Disminución del riesgo de cáncer de ovario   ; Tasas más bajas de osteoporosis, diabetes y enfermedades del corazón  Importancia de la lactancia materna exclusiva   Al proporcionar el alimento ideal para el crecimiento y desarrollo saludable de los bebés, solo se les alimenta con [place], esto es amamantamiento exclusivo   La lactancia materna [facility] primeros 6 meses es muy importante para mejorar la lanette de un bebé y reducir las enfermedades infantiles  Lactancia materna exclusiva chandler los primeros 6 meses  [address] [facility] Pediatría recomienda la lactancia materna exclusiva chandler los primeros seis meses y la lactancia materna continua con suplementos de sólidos chandler los próximos 6 meses  Inicio temprano de la lactancia materna   Las mujeres que alimentan a lynsey bebés dentro de la primera hora de nacimiento se conocen elle inicio temprano de la lactancia materna y aseguran que el recién nacido reciba calostro   El calostro es rico en anticuerpos y nutrientes esenciales  Compartiendo la habitación   Puede estabilizar la respiración y la frecuencia cardíaca del recién nacido   Reduce el llanto   Mejorar la capacidad del recién nacido para mantener la temperatura y los niveles de azúcar en la tyree   Estimulación temprana del sistema inmunitario del bebé   efectos calmantes   Enlace más fácil y rápido   El compartir la habitación promueve conocer a nair recién nacido   El alojamiento conjunto facilita la lactancia materna   Las mujeres que se alojan con lynsey recién nacidos producen [place] y producen un buen suministro de [place]   Se hace más fácil reconocer las señales de alimentación del bebé   Los bebés tienen sesiones de lactancia más largas   Las mujeres que comparten habitación con lynsey recién nacidos tienen más probabilidades de amamantar exclusivamente en comparación con las mujeres que están separadas de lynsey recién nacidos    Piel con piel   El contacto piel con piel debe ocurrir independientemente de la preferencia de alimentación de declan eugenia o el modo de [place]   Después del parto de nair bebé, es importante que declan madre edgar y nair bebé tengan un contacto ininterrumpido de piel a piel   El contacto piel con piel debe ocurrir inmediatamente después del nacimiento chandler al menos declan o dos horas y [place] después de la primera alimentación para las madres que [place]   El contacto piel con piel significa colocar recién nacidos secos y sin ropa sobre el pecho desnudo de nair [place], con mantas calientes cubriendo la espalda del bebé   Todos los procedimientos de rutina, elle las evaluaciones de [place] y recién nacidos, pueden realizarse chandler el contacto piel con piel o pueden retrasarse hasta después del período sensible inmediatamente después del nacimiento   Estamos orgullosos de ofrecer amplios servicios educativos y de apoyo para alentar a las madres a amamantar   Mervat servicio ofrece información, educación y [de-identified] para mujeres que meron amamantar  Las [place] pueden dejar un mensaje o declan pregunta sobre la lactancia en línea en cualquier momento del día  Las enfermeras responderán dentro de las 72 horas   La línea de respuesta de lactancia materna es [phone] ([phone])  NO deje mensajes urgentes o emergentes en esta línea de mensaje  Comuníquese con nair médico Sudhir Meryl si tiene alguna pregunta o inquietud urgente   En michelle de sospecha de declan afección médica de emergencia, [facility]      Attaching your baby at the Breast (English): https://globalhealthmedia org/portfolio-items/attaching-your-baby-at-the-breast/?[email or web address]    Attaching your baby at the Breast (Polish):  [email or web address] org/portfolio-items/[email or web address]      Coronavirus (COVID-19) pregnancy FAQs: Medical experts answer your questions  From [employer] com cy  [id number]  bc (courtesy of [facility])  As of 3/18/20  By Vangie Whelan 37  Medically reviewed by Society for Maternal-Fetal Medicine       We asked parents-to-be to send us their most pressing questions about coronavirus (COVID-19)  Among them: Is it still safe to deliver in a hospital? What if my ob-gyn has the virus? We sent those questions to the top docs at the Society for Maternal-Fetal Medicine  Here are their answers  The coronavirus (COVID-19) pandemic has been declared a national emergency in the [facility] by [employer]  Moms-to-be like you are concerned about everything from getting medicines to managing disruptions at work  But above and beyond any worry about lifestyle changes is a focus on your health and the impact of COVID-19 on your pregnancy  We asked obstetrics doctors who handle the most complicated pregnancy issues to answer your questions about the coronavirus  Here are their responses, provided by Dr Zachariah Horowitz and her colleagues at the Society for [place] 250  Am I at more risk for COVID-19 if I'm pregnant? We don't know  Pregnancy does change your immune system in ways that might make you more susceptible to viral respiratory infections like COVID-19  If you become infected, you might also be at higher risk for more severe illness compared to the general population  Although this does not appear to be the case with COVID-19, based on limited data so far, a higher risk has been true for pregnant women with other coronavirus infections (SARS-CoV and MERS-CoV) and other viral respiratory infections, such as flu  It's important to take preventive actions to avoid infection, such as washing your hands often and avoiding people who are sick  How might coronavirus affect my pregnancy? Again, we don't know   Women with other coronavirus infections (such as SARS-CoV) did not have miscarriage or stillbirth at higher rates than the general population  We know that having other respiratory viral infections during pregnancy, such as flu, has been associated with problems like low birth weight and  birth  Also, having a high fever early in pregnancy may increase the risk of certain birth defects  Could I transmit coronavirus to my baby during pregnancy or delivery? We don't know whether you could transmit COVID-19 to your baby before or during delivery  However, among the few case studies of infants born to mothers with COVID-23 published in peer-reviewed literature, none of the infants tested positive for the virus  Also, there was no virus detected in samples of amniotic fluid or breast milk  There have been a few reports of newborns as young as a few days old with infection, suggesting that a mother can transmit the infection to her infant through close contact after delivery  There have been no reports of mother-to-baby transmission for other coronaviruses (MERS-CoV and SARS-CoV), although only limited information is available  Is it safe for me to deliver at a hospital where there have been COVID-19 cases? Yes  We know that COVID-19 is a very scary virus  The good news is that hospitals are taking great precautions to keep patients and healthcare providers safe  When a patient is even suspected to have COVID-19, they are placed in a negative pressure room  (Think of these rooms as vacuums that suck and filter the air so it's safe for the other people in the hospital)  This makes it possible for you to deliver at the hospital without putting you or your baby at risk  Hospitals are also implementing stricter visiting policies to keep patients safe  It's worth calling your hospital to check if there are any new regulations to be aware of      What plans should I make now in case the hospital system is overwhelmed when it's time for me to deliver? This is a great question to talk with your doctor or midwife about when you're 34 to 36 weeks pregnant  Every hospital is making different plans for dealing with this scenario  I work in healthcare  Should I ask my doctor to excuse me from work until the baby is born? What if I work in a school, the travel Helpmycash 20, or some other high-risk setting? Healthcare facilities should take care to limit the exposure of pregnant employees to patients with confirmed or suspected COVID-19, just as they would with other infectious cases  If you continue working, be sure to follow the CDC's risk assessment and infection control guidelines  If you work in a school, travel Helpmycash 20, or other high-risk setting, talk with your employer about what it's doing to protect employees and minimize infection risks  Wash your hands often  What if my OB gets COVID-19? If your doctor or midwife tests positive for COVID-19, they will need to be quarantined until they recover and are no longer at risk of transmitting the virus  In this case, you'll be assigned to another OB in your doctor's practice (or you may choose another practitioner yourself)  Ask your new OB or your doctor's office if you should self-quarantine or be tested for the virus  (It will depend on when you last saw your provider and when that person tested positive )    Should we hold off on trying to conceive because of COVID-19? At this time, there's no reason to hold off on trying to get pregnant, but the data we have is really limited  For example, we don't think the virus causes birth defects or increases your risk of miscarriage  But we don't know whether you could transmit COVID-19 to your baby before or during delivery  We also don't know if the virus lives in semen or can be sexually transmitted  We have a babymoon scheduled in the next few months - should we cancel?   If you're planning to travel internationally or on a cruise, you should strongly consider canceling  At this time, the virus has reached more than 140 countries, and there are travel bans to [place], most of [place], and [place]  Places where large numbers of people gather are at highest risk, especially airports and cruise ships  If you're planning travel in the [place] , note that any travel setting increases your risk of exposure, and there may be travel bans even in [place] by the time you're scheduled to go  Even if you're state is not blocked, you'll definitely want to avoid traveling to communities where the virus is spreading  To find out where these places are, check The [place] Times map based on CDC data  For the most current advice to help you avoid exposure, check the CDC's COVID-19 travel page  Will the hospital separate me from my  and keep the baby in quarantine? If you test positive for COVID-19 or have been exposed but have no symptoms, the CDC, [employer] of Obstetricians and Gynecologists, and the Society for [place] [employer] all recommend that you be  from your baby to decrease the risk of transmission to the baby  This would last until you are no longer at risk of transmitting the virus  If you do not have COVID-19 and have not been exposed to the virus, the hospital will not separate you from your   My hospital is restricting visitors and only allowing one support person  If my support person leaves after the delivery, will they be allowed to come back? Every hospital has different policies  Contact your hospital or labor and delivery unit a week or so before delivery to get the most up-to-date restrictions  In general, if your support person needs to leave, they would be allowed back unless they knew they were exposed to COVID-19 after leaving your company  [facility] understands that the coronavirus (COVID-19) pandemic is an evolving story and that your questions will change over time   We'll continue asking moms and dads in our Community what they want to know, and we'll get the answers from experts to keep them - and you - informed and supported  My mom was planning to fly here to help me care for my new baby after delivery  Should I tell her not to come? Yes  If your mom is over 61 or has any serious chronic medical conditions (such as heart disease, lung disease, or diabetes), she is at higher risk of serious illness from COVID-19 and should avoid air travel  And remember that any travel setting increases a person's risk of exposure  So, it may be risky to have her around the baby after she has been traveling  For the most current advice on traveling, check the [facility]'s COVID-19 travel page  [facility] understands that the coronavirus pandemic is an evolving story and that your questions will change over time  We'll continue asking moms and dads in our Community what they want to know, and we'll get the answers from experts to keep them - and you - informed and supported

## 2021-09-28 ENCOUNTER — ROUTINE PRENATAL (OUTPATIENT)
Dept: OBGYN CLINIC | Facility: CLINIC | Age: 31
End: 2021-09-28

## 2021-09-28 VITALS
HEART RATE: 92 BPM | BODY MASS INDEX: 22.83 KG/M2 | SYSTOLIC BLOOD PRESSURE: 110 MMHG | WEIGHT: 133 LBS | RESPIRATION RATE: 16 BRPM | DIASTOLIC BLOOD PRESSURE: 76 MMHG

## 2021-09-28 DIAGNOSIS — Z87.51 HISTORY OF PRETERM DELIVERY: ICD-10-CM

## 2021-09-28 DIAGNOSIS — M79.661 BILATERAL CALF PAIN: ICD-10-CM

## 2021-09-28 DIAGNOSIS — O09.291 PREGNANCY, HIGH-RISK, HISTORY OF PREVIOUS OBSTETRICAL PROBLEM, FIRST TRIMESTER: Primary | ICD-10-CM

## 2021-09-28 DIAGNOSIS — Z30.2 ENCOUNTER FOR STERILIZATION: ICD-10-CM

## 2021-09-28 DIAGNOSIS — Z98.891 HISTORY OF CESAREAN SECTION: ICD-10-CM

## 2021-09-28 DIAGNOSIS — M79.662 BILATERAL CALF PAIN: ICD-10-CM

## 2021-09-28 DIAGNOSIS — O09.291 HX OF PREECLAMPSIA, PRIOR PREGNANCY, CURRENTLY PREGNANT, FIRST TRIMESTER: ICD-10-CM

## 2021-09-28 PROCEDURE — 99214 OFFICE O/P EST MOD 30 MIN: CPT | Performed by: OBSTETRICS & GYNECOLOGY

## 2021-09-28 NOTE — PROGRESS NOTES
OB/GYN  PRENATAL H&P VISIT  Neema Tellez  2021  10:52 AM  Dr Dahlia Putnam MD      SUBJECTIVE  Patient is a [id number] at [address] [place] [address] here for initial prenatal H&P  This is an intended and desired pregnancy  Patient lives with spouse and two daughters  She is currently doing well  She denies hx of STD/STI, denies a hx of TB or close contacts with persons with TB  Patient's niece was born with hydrocephalus, FOB is adopted and not sure of family history  She never had MRSA  She denies recent travel or travel planned in the near future  She denies use of nicotine or recreational drug use  She denies vaginal bleeding  She reports some mild lower abdominal cramping and clear discharge  She also reports bilateral calf pain that makes it difficult to climb stairs  Her obstetric history is notable for two prior  deliveries, in [place] (@38wks) and the \[facility]"" (@ about 30 weeks)  She reports that with her first pregnancy, she had a severe headache, and a week later had to have an emergency  section due to vaginal bleeding  She is not sure whether she had a placental abruption or not  She states that she went into  labor  She is not sure of the type of uterine incision, and was not counseled about delivery timing based on her uterine incision  With her second pregnancy she developed preeclampsia after delivery and was taking medication for her blood pressure  She has a history of preeclampsia in her prior two pregnancies  Her history is also notable for a LEEP procedure in the [place] for CIN1      OB History    Para Term  AB Living   3 2 1 1 0 2   SAB TAB Ectopic Multiple Live Births   0 0 0 0 2      # Outcome Date GA Lbr Merrick/2nd Weight Sex Delivery Anes PTL Lv   3 Current            2 Term 18 38w0d   F CS-Unspec EPI  RETA      Complications: Pre-eclampsia   1  06   1588 g (3 lb 8 oz) F CS-Unspec EPI  RETA      Complications: Pre-eclampsia, Bleeding       Review of Systems   Constitutional: Negative for chills and fever  HENT: Negative for ear pain and sore throat  Eyes: Negative for pain and visual disturbance  Respiratory: Negative for cough and shortness of breath  Cardiovascular: Negative for chest pain and palpitations  Gastrointestinal: Negative for abdominal pain and vomiting  Genitourinary: Negative for dysuria and hematuria  Musculoskeletal: Negative for arthralgias and back pain  Skin: Negative for color change and rash  Neurological: Negative for seizures and syncope  All other systems reviewed and are negative        Past Medical History:   Diagnosis Date    Hypertension     with pregnancy    Migraine     blurry vision    Migraines     Varicella     vaccinated per pt        Past Surgical History:   Procedure Laterality Date     SECTION      COLPOSCOPY      PA CONIZATION CERVIX,KNIFE/LASER      WISDOM TOOTH EXTRACTION         Social History     Socioeconomic History    Marital status: /Civil Union     Spouse name: Not on file    Number of children: Not on file    Years of education: Not on file    Highest education level: Not on file   Occupational History    Not on file   Tobacco Use    Smoking status: Never Smoker    Smokeless tobacco: Never Used   Vaping Use    Vaping Use: Never used   Substance and Sexual Activity    Alcohol use: Not Currently    Drug use: Never    Sexual activity: Yes     Partners: Male     Birth control/protection: None   Other Topics Concern    Not on file   Social History Narrative    Not on file     Social Determinants of Health     Financial Resource Strain: Low Risk     Difficulty of Paying Living Expenses: Not hard at all   Food Insecurity: No Food Insecurity    Worried About [address] in the Last Year: Never true    Ana [employer] in the Last Year: Never true   Transportation Needs: No Transportation Needs    Lack of Transportation (Medical): No    Lack of Transportation (Non-Medical): No   Physical Activity:     Days of Exercise per Week:     Minutes of Exercise per Session:    Stress:     Feeling of Stress :    Social Connections:     Frequency of Communication with Friends and Family:     Frequency of Social Gatherings with Friends and Family:     Attends [employer] Services:     Active Member of Clubs or Organizations:     Attends Club or Organization Meetings:     Marital Status:    Intimate Partner Violence: Not At Risk    Fear of Current or Ex-Partner: No    Emotionally Abused: No    Physically Abused: No    Sexually Abused: No       OBJECTIVE  Vitals:    21 1016   BP: 110/76   Pulse: 92   Resp: 16     Physical Exam  Constitutional:       Appearance: She is well-developed  Genitourinary:      Vulva normal       Genitourinary Comments: Small amount of white, thin discharge on speculum exam  KOH, wet mount negative, negative whiff, dry slide negative   HENT:      Head: Normocephalic and atraumatic  Eyes:      Conjunctiva/sclera: Conjunctivae normal    Cardiovascular:      Rate and Rhythm: Normal rate  Pulmonary:      Effort: Pulmonary effort is normal  No respiratory distress  Abdominal:      Palpations: Abdomen is soft  Tenderness: There is no abdominal tenderness  Musculoskeletal:         General: Normal range of motion  Cervical back: Normal range of motion and neck supple  Comments: Positive Homans sign bilaterally   Neurological:      Mental Status: She is alert and oriented to person, place, and time  Skin:     General: Skin is warm and dry  Psychiatric:         Behavior: Behavior normal          Thought Content:  Thought content normal          ASSESSMENT AND PLAN    33 y o , , with /76 (BP Location: Right arm, Patient Position: Sitting, Cuff Size: Standard)   Pulse 92   Resp 16   Wt 60 3 kg (133 lb)   LMP 2021 , at [address] [place] [address] here for her prenatal H&P   bpm by ultrasound    1  Pregnancy: H&P completed today  Patient needs to complete prenatal labs  Pregnancy expectations discussed with patient, including appointment schedule, nutrition, exercise, medications, sexual intercourse, and nausea/vomiting  Patient's BMI is 22  Recommended weight gain is 25-35      2  Screening: Pap smear and GC from 2021 reviewed and normal  Patient declines genetic testing  Will send patient for venous dopplers for bilateral calf pain    3  Consents: Delivery process including repeat  section reviewed  Sign delivery consent form at 28 weeks  4  History of Preeclampsia: baseline labs ordered, patient needs to complete    5  Postpartum: Patient plans on  breastfeeding  Different methods of contraception were discussed with patient, including progesterone only oral pills, depo provera, nexplanon, mirena, and paragard  Patient desires a tubal ligation at the time of her  section    6  Follow up: RTC in 4 weeks  Precautions regarding labor, leakage, bleeding, and fetal movement reviewed      Juan J Wang MD  2021  10:52 AM

## 2021-10-03 PROBLEM — O09.899 HISTORY OF PRETERM DELIVERY, CURRENTLY PREGNANT: Status: ACTIVE | Noted: 2021-10-03

## 2021-10-03 PROBLEM — Z01.419 WOMEN'S ANNUAL ROUTINE GYNECOLOGICAL EXAMINATION: Status: RESOLVED | Noted: 2021-08-04 | Resolved: 2021-10-03

## 2021-10-04 ENCOUNTER — ROUTINE PRENATAL (OUTPATIENT)
Dept: PERINATAL CARE | Facility: OTHER | Age: 31
End: 2021-10-04
Payer: COMMERCIAL

## 2021-10-04 VITALS
SYSTOLIC BLOOD PRESSURE: 124 MMHG | DIASTOLIC BLOOD PRESSURE: 75 MMHG | BODY MASS INDEX: 23.34 KG/M2 | HEART RATE: 98 BPM | HEIGHT: 64 IN | WEIGHT: 136.69 LBS

## 2021-10-04 DIAGNOSIS — Z3A.12 12 WEEKS GESTATION OF PREGNANCY: ICD-10-CM

## 2021-10-04 DIAGNOSIS — O09.899 HISTORY OF PRETERM DELIVERY, CURRENTLY PREGNANT: ICD-10-CM

## 2021-10-04 DIAGNOSIS — Z36.82 ENCOUNTER FOR ANTENATAL SCREENING FOR NUCHAL TRANSLUCENCY: ICD-10-CM

## 2021-10-04 DIAGNOSIS — Z98.891 HISTORY OF CESAREAN SECTION: ICD-10-CM

## 2021-10-04 DIAGNOSIS — O09.291 HX OF PREECLAMPSIA, PRIOR PREGNANCY, CURRENTLY PREGNANT, FIRST TRIMESTER: Primary | ICD-10-CM

## 2021-10-04 PROCEDURE — 76813 OB US NUCHAL MEAS 1 GEST: CPT | Performed by: OBSTETRICS & GYNECOLOGY

## 2021-10-04 PROCEDURE — 99241 PR OFFICE CONSULTATION NEW/ESTAB PATIENT 15 MIN: CPT | Performed by: OBSTETRICS & GYNECOLOGY

## 2021-10-04 PROCEDURE — 76801 OB US < 14 WKS SINGLE FETUS: CPT | Performed by: OBSTETRICS & GYNECOLOGY

## 2021-10-04 RX ORDER — ASPIRIN 81 MG/1
162 TABLET ORAL DAILY
Qty: 180 TABLET | Refills: 0 | Status: SHIPPED | OUTPATIENT
Start: 2021-10-04 | End: 2022-04-09 | Stop reason: HOSPADM

## 2021-10-05 ENCOUNTER — HOSPITAL ENCOUNTER (EMERGENCY)
Facility: HOSPITAL | Age: 31
Discharge: HOME | End: 2021-10-06
Attending: EMERGENCY MEDICINE
Payer: COMMERCIAL

## 2021-10-05 DIAGNOSIS — E87.6 HYPOKALEMIA: ICD-10-CM

## 2021-10-05 DIAGNOSIS — R51.9 HEADACHE: Primary | ICD-10-CM

## 2021-10-05 LAB
ALBUMIN SERPL BCP-MCNC: 4.1 G/DL (ref 3.4–4.8)
ALP SERPL-CCNC: 40.9 U/L (ref 35–140)
ALT SERPL W P-5'-P-CCNC: 13 U/L (ref 5–54)
ANION GAP SERPL CALCULATED.3IONS-SCNC: 8 MMOL/L (ref 4–13)
AST SERPL W P-5'-P-CCNC: 12 U/L (ref 15–41)
BASOPHILS # BLD AUTO: 0.02 THOUSANDS/ΜL (ref 0–0.1)
BASOPHILS NFR BLD AUTO: 0 % (ref 0–1)
BILIRUB SERPL-MCNC: 0.27 MG/DL (ref 0.3–1.2)
BILIRUB UR QL STRIP: NEGATIVE
BUN SERPL-MCNC: 6 MG/DL (ref 6–20)
CALCIUM SERPL-MCNC: 9.6 MG/DL (ref 8.4–10.2)
CHLORIDE SERPL-SCNC: 102 MMOL/L (ref 96–108)
CLARITY UR: CLEAR
CO2 SERPL-SCNC: 26 MMOL/L (ref 22–33)
COLOR UR: YELLOW
CREAT SERPL-MCNC: 0.47 MG/DL (ref 0.4–1.1)
EOSINOPHIL # BLD AUTO: 0.09 THOUSAND/ΜL (ref 0–0.61)
EOSINOPHIL NFR BLD AUTO: 1 % (ref 0–6)
ERYTHROCYTE [DISTWIDTH] IN BLOOD BY AUTOMATED COUNT: 13.1 % (ref 11.6–15.1)
GFR SERPL CREATININE-BSD FRML MDRD: 130 ML/MIN/1.73SQ M
GLUCOSE SERPL-MCNC: 110 MG/DL (ref 65–140)
GLUCOSE UR STRIP-MCNC: NEGATIVE MG/DL
HCT VFR BLD AUTO: 30.3 % (ref 34.8–46.1)
HGB BLD-MCNC: 10.6 G/DL (ref 11.5–15.4)
HGB UR QL STRIP.AUTO: NEGATIVE
IMM GRANULOCYTES # BLD AUTO: 0.02 THOUSAND/UL (ref 0–0.2)
IMM GRANULOCYTES NFR BLD AUTO: 0 % (ref 0–2)
KETONES UR STRIP-MCNC: NEGATIVE MG/DL
LEUKOCYTE ESTERASE UR QL STRIP: NEGATIVE
LYMPHOCYTES # BLD AUTO: 1.99 THOUSANDS/ΜL (ref 0.6–4.47)
LYMPHOCYTES NFR BLD AUTO: 25 % (ref 14–44)
MAGNESIUM SERPL-MCNC: 1.7 MG/DL (ref 1.6–2.6)
MCH RBC QN AUTO: 29.3 PG (ref 26.8–34.3)
MCHC RBC AUTO-ENTMCNC: 35 G/DL (ref 31.4–37.4)
MCV RBC AUTO: 84 FL (ref 82–98)
MONOCYTES # BLD AUTO: 0.56 THOUSAND/ΜL (ref 0.17–1.22)
MONOCYTES NFR BLD AUTO: 7 % (ref 4–12)
NEUTROPHILS # BLD AUTO: 5.31 THOUSANDS/ΜL (ref 1.85–7.62)
NEUTS SEG NFR BLD AUTO: 67 % (ref 43–75)
NITRITE UR QL STRIP: NEGATIVE
PH UR STRIP.AUTO: 7 [PH]
PLATELET # BLD AUTO: 201 THOUSANDS/UL (ref 149–390)
PMV BLD AUTO: 10.8 FL (ref 8.9–12.7)
POTASSIUM SERPL-SCNC: 3.2 MMOL/L (ref 3.5–5)
PROT SERPL-MCNC: 7.2 G/DL (ref 6.4–8.3)
PROT UR STRIP-MCNC: NEGATIVE MG/DL
RBC # BLD AUTO: 3.62 MILLION/UL (ref 3.81–5.12)
SODIUM SERPL-SCNC: 136 MMOL/L (ref 133–145)
SP GR UR STRIP.AUTO: 1.02 (ref 1–1.03)
UROBILINOGEN UR QL STRIP.AUTO: 1 E.U./DL
WBC # BLD AUTO: 7.99 THOUSAND/UL (ref 4.31–10.16)

## 2021-10-05 PROCEDURE — 36415 COLL VENOUS BLD VENIPUNCTURE: CPT | Performed by: STUDENT IN AN ORGANIZED HEALTH CARE EDUCATION/TRAINING PROGRAM

## 2021-10-05 PROCEDURE — 96361 HYDRATE IV INFUSION ADD-ON: CPT

## 2021-10-05 PROCEDURE — 99283 EMERGENCY DEPT VISIT LOW MDM: CPT

## 2021-10-05 PROCEDURE — 99284 EMERGENCY DEPT VISIT MOD MDM: CPT | Performed by: STUDENT IN AN ORGANIZED HEALTH CARE EDUCATION/TRAINING PROGRAM

## 2021-10-05 PROCEDURE — 96375 TX/PRO/DX INJ NEW DRUG ADDON: CPT

## 2021-10-05 PROCEDURE — 83735 ASSAY OF MAGNESIUM: CPT | Performed by: STUDENT IN AN ORGANIZED HEALTH CARE EDUCATION/TRAINING PROGRAM

## 2021-10-05 PROCEDURE — 85025 COMPLETE CBC W/AUTO DIFF WBC: CPT | Performed by: STUDENT IN AN ORGANIZED HEALTH CARE EDUCATION/TRAINING PROGRAM

## 2021-10-05 PROCEDURE — 87086 URINE CULTURE/COLONY COUNT: CPT | Performed by: STUDENT IN AN ORGANIZED HEALTH CARE EDUCATION/TRAINING PROGRAM

## 2021-10-05 PROCEDURE — 81003 URINALYSIS AUTO W/O SCOPE: CPT | Performed by: STUDENT IN AN ORGANIZED HEALTH CARE EDUCATION/TRAINING PROGRAM

## 2021-10-05 PROCEDURE — 96374 THER/PROPH/DIAG INJ IV PUSH: CPT

## 2021-10-05 PROCEDURE — 80053 COMPREHEN METABOLIC PANEL: CPT | Performed by: STUDENT IN AN ORGANIZED HEALTH CARE EDUCATION/TRAINING PROGRAM

## 2021-10-05 RX ORDER — POTASSIUM CHLORIDE 20 MEQ/1
40 TABLET, EXTENDED RELEASE ORAL ONCE
Status: COMPLETED | OUTPATIENT
Start: 2021-10-05 | End: 2021-10-05

## 2021-10-05 RX ORDER — METOCLOPRAMIDE HYDROCHLORIDE 5 MG/ML
10 INJECTION INTRAMUSCULAR; INTRAVENOUS ONCE
Status: COMPLETED | OUTPATIENT
Start: 2021-10-05 | End: 2021-10-05

## 2021-10-05 RX ORDER — ACETAMINOPHEN 325 MG/1
650 TABLET ORAL ONCE
Status: COMPLETED | OUTPATIENT
Start: 2021-10-05 | End: 2021-10-05

## 2021-10-05 RX ORDER — DIPHENHYDRAMINE HYDROCHLORIDE 50 MG/ML
25 INJECTION INTRAMUSCULAR; INTRAVENOUS ONCE
Status: COMPLETED | OUTPATIENT
Start: 2021-10-05 | End: 2021-10-05

## 2021-10-05 RX ADMIN — METOCLOPRAMIDE 10 MG: 5 INJECTION, SOLUTION INTRAMUSCULAR; INTRAVENOUS at 22:56

## 2021-10-05 RX ADMIN — ACETAMINOPHEN 650 MG: 325 TABLET, FILM COATED ORAL at 22:41

## 2021-10-05 RX ADMIN — POTASSIUM CHLORIDE 40 MEQ: 1500 TABLET, EXTENDED RELEASE ORAL at 23:08

## 2021-10-05 RX ADMIN — DIPHENHYDRAMINE HYDROCHLORIDE 25 MG: 50 INJECTION INTRAMUSCULAR; INTRAVENOUS at 22:55

## 2021-10-05 RX ADMIN — SODIUM CHLORIDE 1000 ML: 0.9 INJECTION, SOLUTION INTRAVENOUS at 22:39

## 2021-10-06 VITALS
SYSTOLIC BLOOD PRESSURE: 126 MMHG | HEART RATE: 89 BPM | DIASTOLIC BLOOD PRESSURE: 76 MMHG | WEIGHT: 136 LBS | RESPIRATION RATE: 20 BRPM | BODY MASS INDEX: 23.34 KG/M2 | TEMPERATURE: 98.5 F | OXYGEN SATURATION: 99 %

## 2021-10-07 LAB — BACTERIA UR CULT: NORMAL

## 2021-10-11 ENCOUNTER — TELEPHONE (OUTPATIENT)
Dept: PERINATAL CARE | Facility: CLINIC | Age: 31
End: 2021-10-11

## 2021-10-26 ENCOUNTER — ROUTINE PRENATAL (OUTPATIENT)
Dept: OBGYN CLINIC | Facility: CLINIC | Age: 31
End: 2021-10-26

## 2021-10-26 VITALS
HEART RATE: 89 BPM | SYSTOLIC BLOOD PRESSURE: 119 MMHG | WEIGHT: 140 LBS | HEIGHT: 64 IN | DIASTOLIC BLOOD PRESSURE: 76 MMHG | BODY MASS INDEX: 23.9 KG/M2

## 2021-10-26 DIAGNOSIS — Z30.2 ENCOUNTER FOR STERILIZATION: ICD-10-CM

## 2021-10-26 DIAGNOSIS — Z87.51 HISTORY OF PRETERM DELIVERY: ICD-10-CM

## 2021-10-26 DIAGNOSIS — Z98.891 HISTORY OF CESAREAN SECTION: ICD-10-CM

## 2021-10-26 DIAGNOSIS — O09.291 HX OF PREECLAMPSIA, PRIOR PREGNANCY, CURRENTLY PREGNANT, FIRST TRIMESTER: ICD-10-CM

## 2021-10-26 DIAGNOSIS — Z98.890 H/O LEEP: ICD-10-CM

## 2021-10-26 DIAGNOSIS — O09.92 ENCOUNTER FOR SUPERVISION OF HIGH RISK PREGNANCY IN SECOND TRIMESTER, ANTEPARTUM: Primary | ICD-10-CM

## 2021-10-26 PROBLEM — O09.899 HISTORY OF PRETERM DELIVERY, CURRENTLY PREGNANT: Status: RESOLVED | Noted: 2021-10-03 | Resolved: 2021-10-26

## 2021-10-26 PROCEDURE — 99213 OFFICE O/P EST LOW 20 MIN: CPT | Performed by: OBSTETRICS & GYNECOLOGY

## 2021-11-24 ENCOUNTER — ROUTINE PRENATAL (OUTPATIENT)
Dept: PERINATAL CARE | Facility: OTHER | Age: 31
End: 2021-11-24
Payer: COMMERCIAL

## 2021-11-24 DIAGNOSIS — Z87.51 HISTORY OF PRETERM DELIVERY: ICD-10-CM

## 2021-11-24 DIAGNOSIS — Z98.891 HISTORY OF CESAREAN SECTION: ICD-10-CM

## 2021-11-24 DIAGNOSIS — Z3A.20 20 WEEKS GESTATION OF PREGNANCY: ICD-10-CM

## 2021-11-24 DIAGNOSIS — O09.292 HX OF PREECLAMPSIA, PRIOR PREGNANCY, CURRENTLY PREGNANT, SECOND TRIMESTER: Primary | ICD-10-CM

## 2021-11-24 DIAGNOSIS — Z98.890 H/O LEEP: ICD-10-CM

## 2021-11-24 PROCEDURE — 76817 TRANSVAGINAL US OBSTETRIC: CPT | Performed by: OBSTETRICS & GYNECOLOGY

## 2021-11-24 PROCEDURE — 76811 OB US DETAILED SNGL FETUS: CPT | Performed by: OBSTETRICS & GYNECOLOGY

## 2021-11-24 PROCEDURE — 99213 OFFICE O/P EST LOW 20 MIN: CPT | Performed by: OBSTETRICS & GYNECOLOGY

## 2021-11-25 VITALS
HEART RATE: 102 BPM | SYSTOLIC BLOOD PRESSURE: 122 MMHG | HEIGHT: 64 IN | BODY MASS INDEX: 24.39 KG/M2 | WEIGHT: 142.86 LBS | DIASTOLIC BLOOD PRESSURE: 73 MMHG

## 2021-11-25 PROBLEM — Z3A.20 20 WEEKS GESTATION OF PREGNANCY: Status: ACTIVE | Noted: 2021-11-25

## 2021-11-25 PROBLEM — Z30.2 ENCOUNTER FOR STERILIZATION: Status: RESOLVED | Noted: 2021-09-28 | Resolved: 2021-11-25

## 2021-11-25 PROBLEM — O09.292 HX OF PREECLAMPSIA, PRIOR PREGNANCY, CURRENTLY PREGNANT, SECOND TRIMESTER: Status: ACTIVE | Noted: 2021-09-28

## 2021-12-08 LAB
ABO GROUP BLD: ABNORMAL
ALBUMIN SERPL-MCNC: 3.7 G/DL (ref 3.6–5.1)
ALBUMIN/GLOB SERPL: 1.5 (CALC) (ref 1–2.5)
ALP SERPL-CCNC: 44 U/L (ref 31–125)
ALT SERPL-CCNC: 10 U/L (ref 6–29)
AST SERPL-CCNC: 10 U/L (ref 10–30)
BASOPHILS # BLD AUTO: 17 CELLS/UL (ref 0–200)
BASOPHILS NFR BLD AUTO: 0.2 %
BILIRUB SERPL-MCNC: 0.3 MG/DL (ref 0.2–1.2)
BLD GP AB SCN SERPL QL: ABNORMAL
BUN SERPL-MCNC: 9 MG/DL (ref 7–25)
BUN/CREAT SERPL: 21 (CALC) (ref 6–22)
CALCIUM SERPL-MCNC: 8.9 MG/DL (ref 8.6–10.2)
CHLORIDE SERPL-SCNC: 102 MMOL/L (ref 98–110)
CO2 SERPL-SCNC: 25 MMOL/L (ref 20–32)
CREAT SERPL-MCNC: 0.43 MG/DL (ref 0.5–1.1)
CREAT UR-MCNC: 208 MG/DL (ref 20–275)
EOSINOPHIL # BLD AUTO: 67 CELLS/UL (ref 15–500)
EOSINOPHIL NFR BLD AUTO: 0.8 %
ERYTHROCYTE [DISTWIDTH] IN BLOOD BY AUTOMATED COUNT: 12.5 % (ref 11–15)
ERYTHROCYTE [DISTWIDTH] IN BLOOD BY AUTOMATED COUNT: 12.5 % (ref 11–15)
GLOBULIN SER CALC-MCNC: 2.5 G/DL (CALC) (ref 1.9–3.7)
GLUCOSE SERPL-MCNC: 92 MG/DL (ref 65–139)
HBV SURFACE AG SERPL QL IA: ABNORMAL
HCT VFR BLD AUTO: 30 % (ref 35–45)
HCT VFR BLD AUTO: 30 % (ref 35–45)
HGB A MFR BLD: 97 %
HGB A2 MFR BLD: 3 % (ref 2.2–3.2)
HGB BLD-MCNC: 10.2 G/DL (ref 11.7–15.5)
HGB BLD-MCNC: 10.2 G/DL (ref 11.7–15.5)
HGB F MFR BLD: <1 %
HGB FRACT BLD-IMP: ABNORMAL
LYMPHOCYTES # BLD AUTO: 1630 CELLS/UL (ref 850–3900)
LYMPHOCYTES NFR BLD AUTO: 19.4 %
MCH RBC QN AUTO: 29.2 PG (ref 27–33)
MCH RBC QN AUTO: 29.2 PG (ref 27–33)
MCHC RBC AUTO-ENTMCNC: 34 G/DL (ref 32–36)
MCV RBC AUTO: 86 FL (ref 80–100)
MCV RBC AUTO: 86 FL (ref 80–100)
MONOCYTES # BLD AUTO: 588 CELLS/UL (ref 200–950)
MONOCYTES NFR BLD AUTO: 7 %
NEUTROPHILS # BLD AUTO: 6098 CELLS/UL (ref 1500–7800)
NEUTROPHILS NFR BLD AUTO: 72.6 %
PLATELET # BLD AUTO: 174 THOUSAND/UL (ref 140–400)
PMV BLD REES-ECKER: 12.2 FL (ref 7.5–12.5)
POTASSIUM SERPL-SCNC: 3.6 MMOL/L (ref 3.5–5.3)
PROT SERPL-MCNC: 6.2 G/DL (ref 6.1–8.1)
PROT UR-MCNC: 20 MG/DL (ref 5–24)
PROT/CREAT UR: 0.1 MG/MG CREAT (ref 0.02–0.16)
PROT/CREAT UR: 96 MG/G CREAT (ref 21–161)
RBC # BLD AUTO: 3.49 MILLION/UL (ref 3.8–5.1)
RBC # BLD AUTO: 3.49 MILLION/UL (ref 3.8–5.1)
RH BLD: ABNORMAL
RPR SER QL: ABNORMAL
RUBV IGG SERPL IA-ACNC: 28.4 INDEX
SL AMB EGFR AFRICAN AMERICAN: 155 ML/MIN/1.73M2
SL AMB EGFR NON AFRICAN AMERICAN: 134 ML/MIN/1.73M2
SODIUM SERPL-SCNC: 137 MMOL/L (ref 135–146)
VZV IGG SER IA-ACNC: 850.3 INDEX
WBC # BLD AUTO: 8.4 THOUSAND/UL (ref 3.8–10.8)

## 2021-12-09 DIAGNOSIS — O99.019 ANEMIA AFFECTING PREGNANCY, ANTEPARTUM: Primary | ICD-10-CM

## 2021-12-09 RX ORDER — DOCUSATE SODIUM 100 MG/1
100 CAPSULE, LIQUID FILLED ORAL 2 TIMES DAILY
Qty: 30 CAPSULE | Refills: 6 | Status: SHIPPED | OUTPATIENT
Start: 2021-12-09 | End: 2022-02-02

## 2021-12-09 RX ORDER — FERROUS SULFATE TAB EC 324 MG (65 MG FE EQUIVALENT) 324 (65 FE) MG
324 TABLET DELAYED RESPONSE ORAL
Qty: 30 TABLET | Refills: 6 | Status: SHIPPED | OUTPATIENT
Start: 2021-12-09

## 2021-12-10 ENCOUNTER — TELEPHONE (OUTPATIENT)
Dept: OBGYN CLINIC | Facility: CLINIC | Age: 31
End: 2021-12-10

## 2022-01-09 PROBLEM — Z72.51 HIGH RISK HETEROSEXUAL BEHAVIOR: Status: RESOLVED | Noted: 2021-08-04 | Resolved: 2022-01-09

## 2022-01-09 PROBLEM — Z3A.26 26 WEEKS GESTATION OF PREGNANCY: Status: ACTIVE | Noted: 2021-11-25

## 2022-02-02 ENCOUNTER — ULTRASOUND (OUTPATIENT)
Dept: PERINATAL CARE | Facility: OTHER | Age: 32
End: 2022-02-02
Payer: COMMERCIAL

## 2022-02-02 VITALS
WEIGHT: 158.51 LBS | BODY MASS INDEX: 27.06 KG/M2 | DIASTOLIC BLOOD PRESSURE: 80 MMHG | SYSTOLIC BLOOD PRESSURE: 125 MMHG | HEART RATE: 105 BPM | HEIGHT: 64 IN

## 2022-02-02 DIAGNOSIS — O09.293 HX OF PREECLAMPSIA, PRIOR PREGNANCY, CURRENTLY PREGNANT, THIRD TRIMESTER: Primary | ICD-10-CM

## 2022-02-02 DIAGNOSIS — O34.219 PREGNANCY WITH HISTORY OF CESAREAN SECTION, ANTEPARTUM: ICD-10-CM

## 2022-02-02 DIAGNOSIS — Z3A.30 30 WEEKS GESTATION OF PREGNANCY: ICD-10-CM

## 2022-02-02 PROCEDURE — 76816 OB US FOLLOW-UP PER FETUS: CPT | Performed by: OBSTETRICS & GYNECOLOGY

## 2022-02-02 PROCEDURE — 99214 OFFICE O/P EST MOD 30 MIN: CPT | Performed by: OBSTETRICS & GYNECOLOGY

## 2022-02-02 NOTE — LETTER
2022     Alvaro Santana MD  [address]    Patient: [place] [facility]   YOB: 1990   Date of Visit: 2022       Dear Dr Margaret Molina: Thank you for referring Jose F Baptiste to me for evaluation  Below are my notes for this consultation  If you have questions, please do not hesitate to call me  I look forward to following your patient along with you  Sincerely,        Armando Robledo MD        CC: No Recipients  Armando Robledo MD  2022  2:37 PM  Sign when Signing Visit  Jose F Baptiste reports regular fetal movements  She thinks she has a vaginal infection that is causing her some discomfort and  an increase in her discharge  She is here today at 30w0d for an ultrasound for an ultrasound for growth and missed anatomy  She has not completed her 28 week labs and upon review of her schedule her last OB visit was completed on 10/26/21 at 15 weeks and 6 days  She denies any signs or symptoms of preeclampsia  I used the language line to  this patient  number [id number]  Problem list:  1  She has a history of 2 prior  sections and in her 2 previous pregnancies she developed preeclampsia with 1 requiring a  delivery  Ultrasound findings: The ultrasound today shows normal interval fetal growth and fluid  The prior missed anatomy that was poorly seen on her last ultrasound was reviewed today and appears normal   This completes the anatomical survey  Her placenta is not near her prior  scars  Pregnancy ultrasound has limitations and is unable to detect all forms of fetal congenital abnormalities  The inaccuracy in the EFW can be off by 1 lb either way in the third trimester  Follow up recommended:   1  No further follow-up ultrasounds are recommended at this time    2  Recommend she call her OB office as soon as she leaves today to make a an appointment this week for a vaginal exam and to get lab work for her 28 week labs  3  I will also send a message to her OB office to contact Neema to schedule a follow-up visit this week  Pre visit time reviewing her records   5 minutes  Face to face time 15 minutes  Post visit time on documentation of note, updating her problem list, adding orders and prescriptions 5 minutes  Procedures that were completed today were charged separately  The level of decision making was moderate complexity      Jenifer Montesinos MD

## 2022-02-02 NOTE — Clinical Note
Maggi Castro    Please for this message to someone in your office to set this patient up for an OB visit Thursday or Friday of this week  She came to her OB visit complaining of probable vaginal infection with increase in her discharge  She has not been seen since October 26th when she was 15 weeks and 3 days in your office       Logan Vick

## 2022-02-02 NOTE — PROGRESS NOTES
Shana Frederick reports regular fetal movements  She thinks she has a vaginal infection that is causing her some discomfort and  an increase in her discharge  She is here today at 30w0d for an ultrasound for an ultrasound for growth and missed anatomy  She has not completed her 28 week labs and upon review of her schedule her last OB visit was completed on 10/26/21 at 15 weeks and 6 days  She denies any signs or symptoms of preeclampsia  I used the language line to  this patient  number [id number]  Problem list:  1  She has a history of 2 prior  sections and in her 2 previous pregnancies she developed preeclampsia with 1 requiring a  delivery  Ultrasound findings: The ultrasound today shows normal interval fetal growth and fluid  The prior missed anatomy that was poorly seen on her last ultrasound was reviewed today and appears normal   This completes the anatomical survey  Her placenta is not near her prior  scars  Pregnancy ultrasound has limitations and is unable to detect all forms of fetal congenital abnormalities  The inaccuracy in the EFW can be off by 1 lb either way in the third trimester  Follow up recommended:   1  No further follow-up ultrasounds are recommended at this time  2  Recommend she call her OB office as soon as she leaves today to make a an appointment this week for a vaginal exam and to get lab work for her 28 week labs  3  I will also send a message to her OB office to contact Neema to schedule a follow-up visit this week  Pre visit time reviewing her records   5 minutes  Face to face time 15 minutes  Post visit time on documentation of note, updating her problem list, adding orders and prescriptions 5 minutes  Procedures that were completed today were charged separately  The level of decision making was moderate complexity      Leonor Bauman MD

## 2022-02-04 ENCOUNTER — ROUTINE PRENATAL (OUTPATIENT)
Dept: OBGYN CLINIC | Facility: CLINIC | Age: 32
End: 2022-02-04

## 2022-02-04 VITALS
BODY MASS INDEX: 26.46 KG/M2 | HEIGHT: 64 IN | SYSTOLIC BLOOD PRESSURE: 114 MMHG | HEART RATE: 103 BPM | DIASTOLIC BLOOD PRESSURE: 76 MMHG | WEIGHT: 155 LBS

## 2022-02-04 DIAGNOSIS — Z3A.30 30 WEEKS GESTATION OF PREGNANCY: Primary | ICD-10-CM

## 2022-02-04 DIAGNOSIS — N76.0 BACTERIAL VAGINOSIS: ICD-10-CM

## 2022-02-04 DIAGNOSIS — B96.89 BACTERIAL VAGINOSIS: ICD-10-CM

## 2022-02-04 DIAGNOSIS — B37.3 VAGINAL CANDIDIASIS: ICD-10-CM

## 2022-02-04 PROCEDURE — 90471 IMMUNIZATION ADMIN: CPT

## 2022-02-04 PROCEDURE — 99213 OFFICE O/P EST LOW 20 MIN: CPT | Performed by: OBSTETRICS & GYNECOLOGY

## 2022-02-04 PROCEDURE — 90715 TDAP VACCINE 7 YRS/> IM: CPT

## 2022-02-04 RX ORDER — METRONIDAZOLE 500 MG/1
500 TABLET ORAL EVERY 12 HOURS SCHEDULED
Qty: 14 TABLET | Refills: 0 | Status: SHIPPED | OUTPATIENT
Start: 2022-02-04 | End: 2022-02-11

## 2022-02-04 NOTE — PROGRESS NOTES
OB/GYN  PN Visit  Adal Calderón  [id number]  2/4/2022  Dr Vaishali Ellison, DO    S: 33 y o  [id number] 30w2d here for PN visit  Last OB visit at 15 weeks and 6 days  OB complaints:  Ctxns: reports feeling occaisionally  Leakage: some discharge no leakage  Bleeding: no  Fetal movement: Baby is moving regularly     Patient notes symptoms of vaginal infection with significant discharge  2 prior cesarians - patient does request tubal at time of delivery  MA 31 and delivery consent is completed by Dr Alex Bingham at today's visit  Patient continues with taking Prenatal, ASA and folic acid due to prior preE history  Patient is here with FOB who is translating today  O:  Vitals:    02/04/22 0804   BP: 114/76   Pulse: 103       Gen: no acute distress, nonlabored breathing, patient is generally well appearing  OB exam completed: fundal height, FHTs  Vaginal exam: Patient with white "cottage cheese" discharge as well as with liquid white discharge on vaginal exam    A/P:  #1  30w2d GESTATION  Labor precautions reviewed  Fetal kick counts reviewed  RTC in 2 weeks    #2  Patient is agreeable to receive Tdap vaccine today, she defers the flu vaccine today    #3  Patient requires CBC and 1H GTT ordered today    #4  Patient with white discharge, positive whiff test, and increased pH on exam today  Plan to complete 7 days Flagyl for suspected BV as well as 7 days monistat for suspected superimposed yeast infection       Future Appointments   Date Time Provider Tico Summers   2/22/2022  8:15 AM [facility] TAYA Torres DO  2/4/2022  6:01 PM

## 2022-02-15 ENCOUNTER — HOSPITAL ENCOUNTER (OUTPATIENT)
Dept: VASCULAR ULTRASOUND | Facility: HOSPITAL | Age: 32
Discharge: HOME | End: 2022-02-15
Payer: COMMERCIAL

## 2022-02-15 DIAGNOSIS — M79.661 BILATERAL CALF PAIN: ICD-10-CM

## 2022-02-15 DIAGNOSIS — M79.662 BILATERAL CALF PAIN: ICD-10-CM

## 2022-02-15 PROCEDURE — 93970 EXTREMITY STUDY: CPT

## 2022-02-15 PROCEDURE — 93970 EXTREMITY STUDY: CPT | Performed by: SURGERY

## 2022-02-21 ENCOUNTER — TELEPHONE (OUTPATIENT)
Dept: OBGYN CLINIC | Facility: CLINIC | Age: 32
End: 2022-02-21

## 2022-02-22 ENCOUNTER — ROUTINE PRENATAL (OUTPATIENT)
Dept: OBGYN CLINIC | Facility: CLINIC | Age: 32
End: 2022-02-22

## 2022-02-22 ENCOUNTER — APPOINTMENT (OUTPATIENT)
Dept: LAB | Facility: CLINIC | Age: 32
End: 2022-02-22
Payer: COMMERCIAL

## 2022-02-22 VITALS
WEIGHT: 158 LBS | DIASTOLIC BLOOD PRESSURE: 77 MMHG | SYSTOLIC BLOOD PRESSURE: 125 MMHG | HEIGHT: 64 IN | BODY MASS INDEX: 26.98 KG/M2 | HEART RATE: 102 BPM

## 2022-02-22 DIAGNOSIS — Z87.51 HISTORY OF PRETERM DELIVERY: ICD-10-CM

## 2022-02-22 DIAGNOSIS — Z3A.32 32 WEEKS GESTATION OF PREGNANCY: ICD-10-CM

## 2022-02-22 DIAGNOSIS — Z3A.30 30 WEEKS GESTATION OF PREGNANCY: ICD-10-CM

## 2022-02-22 DIAGNOSIS — Z34.83 ENCOUNTER FOR SUPERVISION OF OTHER NORMAL PREGNANCY IN THIRD TRIMESTER: Primary | ICD-10-CM

## 2022-02-22 DIAGNOSIS — O34.219 PREGNANCY WITH HISTORY OF CESAREAN SECTION, ANTEPARTUM: ICD-10-CM

## 2022-02-22 PROBLEM — Z30.9 CONTRACEPTION MANAGEMENT: Status: ACTIVE | Noted: 2022-02-22

## 2022-02-22 LAB
BASOPHILS # BLD AUTO: 0.02 THOUSANDS/ΜL (ref 0–0.1)
BASOPHILS NFR BLD AUTO: 0 % (ref 0–1)
EOSINOPHIL # BLD AUTO: 0.07 THOUSAND/ΜL (ref 0–0.61)
EOSINOPHIL NFR BLD AUTO: 1 % (ref 0–6)
ERYTHROCYTE [DISTWIDTH] IN BLOOD BY AUTOMATED COUNT: 13.7 % (ref 11.6–15.1)
GLUCOSE 1H P 50 G GLC PO SERPL-MCNC: 138 MG/DL (ref 40–134)
HCT VFR BLD AUTO: 31.2 % (ref 34.8–46.1)
HGB BLD-MCNC: 10.2 G/DL (ref 11.5–15.4)
IMM GRANULOCYTES # BLD AUTO: 0.08 THOUSAND/UL (ref 0–0.2)
IMM GRANULOCYTES NFR BLD AUTO: 1 % (ref 0–2)
LYMPHOCYTES # BLD AUTO: 1.57 THOUSANDS/ΜL (ref 0.6–4.47)
LYMPHOCYTES NFR BLD AUTO: 19 % (ref 14–44)
MCH RBC QN AUTO: 28.3 PG (ref 26.8–34.3)
MCHC RBC AUTO-ENTMCNC: 32.7 G/DL (ref 31.4–37.4)
MCV RBC AUTO: 86 FL (ref 82–98)
MONOCYTES # BLD AUTO: 0.53 THOUSAND/ΜL (ref 0.17–1.22)
MONOCYTES NFR BLD AUTO: 6 % (ref 4–12)
NEUTROPHILS # BLD AUTO: 6.1 THOUSANDS/ΜL (ref 1.85–7.62)
NEUTS SEG NFR BLD AUTO: 73 % (ref 43–75)
NRBC BLD AUTO-RTO: 0 /100 WBCS
PLATELET # BLD AUTO: 147 THOUSANDS/UL (ref 149–390)
PMV BLD AUTO: 11.8 FL (ref 8.9–12.7)
RBC # BLD AUTO: 3.61 MILLION/UL (ref 3.81–5.12)
WBC # BLD AUTO: 8.37 THOUSAND/UL (ref 4.31–10.16)

## 2022-02-22 PROCEDURE — 36415 COLL VENOUS BLD VENIPUNCTURE: CPT

## 2022-02-22 PROCEDURE — 86592 SYPHILIS TEST NON-TREP QUAL: CPT

## 2022-02-22 PROCEDURE — 82950 GLUCOSE TEST: CPT

## 2022-02-22 PROCEDURE — 85025 COMPLETE CBC W/AUTO DIFF WBC: CPT

## 2022-02-22 PROCEDURE — 99213 OFFICE O/P EST LOW 20 MIN: CPT | Performed by: OBSTETRICS & GYNECOLOGY

## 2022-02-22 NOTE — PROGRESS NOTES
[employer]  #[id number] used for this encounter    Assessment & Plan  27 y o  [id number] at [address] [place] [address] presenting for routine prenatal visit  Return in 2 weeks for next visit  Instructed patient to complete 28 week labs    Problem List        Other    Pregnancy with history of  section, antepartum    Overview     Two prior  sections         Hx of preeclampsia, prior pregnancy, currently pregnant, third trimester    Overview     Needs to complete prenatal panel and baseline preeclamptic labs  Started on 162 mg aspirin daily         History of  delivery    Overview     At about 30 weeks, secondary to vaginal bleeding per patient  Per patient, she went into  labor         H/O LEEP    Overview     In the [facility], for CIN1         32 weeks gestation of pregnancy    Contraception management    Overview     Desires tubal ligation  MA-31 signed 22           Other Visit Diagnoses     Encounter for supervision of other normal pregnancy in third trimester    -  Primary          ____________________________________________________________  Subjective  She is without complaint  She denies contractions, loss of fluid, or vaginal bleeding  She feels regular fetal movements  Objective  /77 (BP Location: Right arm, Patient Position: Sitting, Cuff Size: Adult)   Pulse 102   Ht 5' 4" (1 626 m)   Wt 71 7 kg (158 lb)   LMP 2021 (Approximate)   Breastfeeding No   BMI 27 12 kg/m²   FHR: 130 bpm    Physical Exam  Constitutional:       Appearance: Normal appearance  HENT:      Head: Normocephalic and atraumatic  Cardiovascular:      Rate and Rhythm: Normal rate  Pulmonary:      Effort: Pulmonary effort is normal  No respiratory distress  Abdominal:      Palpations: Abdomen is soft  Tenderness: There is no abdominal tenderness  Musculoskeletal:         General: Normal range of motion  Neurological:      Mental Status: She is alert     Skin:     General: Skin is warm and dry            Patient's Active Problem List  Patient Active Problem List   Diagnosis    Pregnancy with history of  section, antepartum    Hx of preeclampsia, prior pregnancy, currently pregnant, third trimester    History of  delivery    H/O LEEP    32 weeks gestation of pregnancy    Contraception management           Nallely Alcantar MD  OB/GYN PGY-3  2022  8:32 AM

## 2022-02-23 ENCOUNTER — HOSPITAL ENCOUNTER (EMERGENCY)
Facility: HOSPITAL | Age: 32
End: 2022-02-24
Attending: EMERGENCY MEDICINE | Admitting: EMERGENCY MEDICINE
Payer: COMMERCIAL

## 2022-02-23 ENCOUNTER — TELEPHONE (OUTPATIENT)
Dept: OBGYN CLINIC | Facility: CLINIC | Age: 32
End: 2022-02-23

## 2022-02-23 VITALS
HEIGHT: 64 IN | SYSTOLIC BLOOD PRESSURE: 119 MMHG | OXYGEN SATURATION: 100 % | WEIGHT: 158 LBS | HEART RATE: 100 BPM | RESPIRATION RATE: 18 BRPM | BODY MASS INDEX: 26.98 KG/M2 | DIASTOLIC BLOOD PRESSURE: 69 MMHG

## 2022-02-23 DIAGNOSIS — R10.9 ABDOMINAL PAIN DURING PREGNANCY: Primary | ICD-10-CM

## 2022-02-23 DIAGNOSIS — O26.899 ABDOMINAL PAIN DURING PREGNANCY: Primary | ICD-10-CM

## 2022-02-23 DIAGNOSIS — Z3A.30 30 WEEKS GESTATION OF PREGNANCY: Primary | ICD-10-CM

## 2022-02-23 DIAGNOSIS — Z3A.33 33 WEEKS GESTATION OF PREGNANCY: ICD-10-CM

## 2022-02-23 LAB
ALBUMIN SERPL BCP-MCNC: 3.5 G/DL (ref 3.4–4.8)
ALP SERPL-CCNC: 94.4 U/L (ref 35–140)
ALT SERPL W P-5'-P-CCNC: 11 U/L (ref 5–54)
ANION GAP SERPL CALCULATED.3IONS-SCNC: 9 MMOL/L (ref 4–13)
APTT PPP: 27 SECONDS (ref 23–37)
AST SERPL W P-5'-P-CCNC: 15 U/L (ref 15–41)
BASOPHILS # BLD AUTO: 0.03 THOUSANDS/ΜL (ref 0–0.1)
BASOPHILS NFR BLD AUTO: 0 % (ref 0–1)
BILIRUB SERPL-MCNC: 0.29 MG/DL (ref 0.3–1.2)
BILIRUB UR QL STRIP: NEGATIVE
BUN SERPL-MCNC: 7 MG/DL (ref 6–20)
CALCIUM SERPL-MCNC: 9.2 MG/DL (ref 8.4–10.2)
CHLORIDE SERPL-SCNC: 101 MMOL/L (ref 96–108)
CLARITY UR: CLEAR
CO2 SERPL-SCNC: 25 MMOL/L (ref 22–33)
COLOR UR: YELLOW
CREAT SERPL-MCNC: 0.44 MG/DL (ref 0.4–1.1)
EOSINOPHIL # BLD AUTO: 0.12 THOUSAND/ΜL (ref 0–0.61)
EOSINOPHIL NFR BLD AUTO: 1 % (ref 0–6)
ERYTHROCYTE [DISTWIDTH] IN BLOOD BY AUTOMATED COUNT: 13.2 % (ref 11.6–15.1)
GFR SERPL CREATININE-BSD FRML MDRD: 132 ML/MIN/1.73SQ M
GLUCOSE SERPL-MCNC: 90 MG/DL (ref 65–140)
GLUCOSE UR STRIP-MCNC: NEGATIVE MG/DL
HCT VFR BLD AUTO: 31.3 % (ref 34.8–46.1)
HGB BLD-MCNC: 10.5 G/DL (ref 11.5–15.4)
HGB UR QL STRIP.AUTO: NEGATIVE
IMM GRANULOCYTES # BLD AUTO: 0.08 THOUSAND/UL (ref 0–0.2)
IMM GRANULOCYTES NFR BLD AUTO: 1 % (ref 0–2)
INR PPP: 0.99 (ref 0.84–1.19)
KETONES UR STRIP-MCNC: ABNORMAL MG/DL
LEUKOCYTE ESTERASE UR QL STRIP: NEGATIVE
LYMPHOCYTES # BLD AUTO: 2.1 THOUSANDS/ΜL (ref 0.6–4.47)
LYMPHOCYTES NFR BLD AUTO: 21 % (ref 14–44)
MCH RBC QN AUTO: 28.1 PG (ref 26.8–34.3)
MCHC RBC AUTO-ENTMCNC: 33.5 G/DL (ref 31.4–37.4)
MCV RBC AUTO: 84 FL (ref 82–98)
MONOCYTES # BLD AUTO: 0.94 THOUSAND/ΜL (ref 0.17–1.22)
MONOCYTES NFR BLD AUTO: 9 % (ref 4–12)
NEUTROPHILS # BLD AUTO: 6.94 THOUSANDS/ΜL (ref 1.85–7.62)
NEUTS SEG NFR BLD AUTO: 68 % (ref 43–75)
NITRITE UR QL STRIP: NEGATIVE
NRBC BLD AUTO-RTO: 0 /100 WBCS
PH UR STRIP.AUTO: 7 [PH]
PLATELET # BLD AUTO: 165 THOUSANDS/UL (ref 149–390)
PMV BLD AUTO: 10.9 FL (ref 8.9–12.7)
POTASSIUM SERPL-SCNC: 3.9 MMOL/L (ref 3.5–5)
PROT SERPL-MCNC: 6.5 G/DL (ref 6.4–8.3)
PROT UR STRIP-MCNC: NEGATIVE MG/DL
PROTHROMBIN TIME: 13 SECONDS (ref 11.6–14.5)
RBC # BLD AUTO: 3.74 MILLION/UL (ref 3.81–5.12)
RPR SER QL: NORMAL
SODIUM SERPL-SCNC: 135 MMOL/L (ref 133–145)
SP GR UR STRIP.AUTO: 1.01 (ref 1–1.03)
UROBILINOGEN UR QL STRIP.AUTO: 0.2 E.U./DL
WBC # BLD AUTO: 10.21 THOUSAND/UL (ref 4.31–10.16)

## 2022-02-23 PROCEDURE — 85025 COMPLETE CBC W/AUTO DIFF WBC: CPT | Performed by: STUDENT IN AN ORGANIZED HEALTH CARE EDUCATION/TRAINING PROGRAM

## 2022-02-23 PROCEDURE — 85610 PROTHROMBIN TIME: CPT | Performed by: STUDENT IN AN ORGANIZED HEALTH CARE EDUCATION/TRAINING PROGRAM

## 2022-02-23 PROCEDURE — 36415 COLL VENOUS BLD VENIPUNCTURE: CPT | Performed by: STUDENT IN AN ORGANIZED HEALTH CARE EDUCATION/TRAINING PROGRAM

## 2022-02-23 PROCEDURE — 87086 URINE CULTURE/COLONY COUNT: CPT | Performed by: STUDENT IN AN ORGANIZED HEALTH CARE EDUCATION/TRAINING PROGRAM

## 2022-02-23 PROCEDURE — 81003 URINALYSIS AUTO W/O SCOPE: CPT | Performed by: STUDENT IN AN ORGANIZED HEALTH CARE EDUCATION/TRAINING PROGRAM

## 2022-02-23 PROCEDURE — 96360 HYDRATION IV INFUSION INIT: CPT

## 2022-02-23 PROCEDURE — 85384 FIBRINOGEN ACTIVITY: CPT

## 2022-02-23 PROCEDURE — 80053 COMPREHEN METABOLIC PANEL: CPT | Performed by: STUDENT IN AN ORGANIZED HEALTH CARE EDUCATION/TRAINING PROGRAM

## 2022-02-23 PROCEDURE — 99284 EMERGENCY DEPT VISIT MOD MDM: CPT

## 2022-02-23 PROCEDURE — 85730 THROMBOPLASTIN TIME PARTIAL: CPT | Performed by: STUDENT IN AN ORGANIZED HEALTH CARE EDUCATION/TRAINING PROGRAM

## 2022-02-23 RX ORDER — SODIUM CHLORIDE, SODIUM LACTATE, POTASSIUM CHLORIDE, CALCIUM CHLORIDE 600; 310; 30; 20 MG/100ML; MG/100ML; MG/100ML; MG/100ML
125 INJECTION, SOLUTION INTRAVENOUS CONTINUOUS
Status: DISCONTINUED | OUTPATIENT
Start: 2022-02-23 | End: 2022-02-24 | Stop reason: HOSPADM

## 2022-02-23 RX ADMIN — SODIUM CHLORIDE, SODIUM LACTATE, POTASSIUM CHLORIDE, AND CALCIUM CHLORIDE 125 ML/HR: .6; .31; .03; .02 INJECTION, SOLUTION INTRAVENOUS at 23:25

## 2022-02-23 NOTE — TELEPHONE ENCOUNTER
Called and informed pt regarding elevated 1 hour glucose tolerance test  Doctor ordered the 3 hour test which she recommended to be complete  Also her  hemoglobin is also still a little low  Which was told she should continue to take daily oral iron supplementation  Pt understood and said will complete the 3 hour glucose tolerance and keep drinking the iron supplement

## 2022-02-24 ENCOUNTER — HOSPITAL ENCOUNTER (OUTPATIENT)
Facility: HOSPITAL | Age: 32
Discharge: HOME | End: 2022-02-24
Attending: OBSTETRICS & GYNECOLOGY | Admitting: STUDENT IN AN ORGANIZED HEALTH CARE EDUCATION/TRAINING PROGRAM
Payer: COMMERCIAL

## 2022-02-24 ENCOUNTER — HOSPITAL ENCOUNTER (OUTPATIENT)
Facility: HOSPITAL | Age: 32
End: 2022-02-24
Attending: STUDENT IN AN ORGANIZED HEALTH CARE EDUCATION/TRAINING PROGRAM | Admitting: STUDENT IN AN ORGANIZED HEALTH CARE EDUCATION/TRAINING PROGRAM
Payer: COMMERCIAL

## 2022-02-24 VITALS
RESPIRATION RATE: 18 BRPM | BODY MASS INDEX: 26.98 KG/M2 | HEIGHT: 64 IN | DIASTOLIC BLOOD PRESSURE: 77 MMHG | TEMPERATURE: 98.3 F | SYSTOLIC BLOOD PRESSURE: 115 MMHG | WEIGHT: 158 LBS | HEART RATE: 93 BPM

## 2022-02-24 DIAGNOSIS — N76.0 BACTERIAL VAGINOSIS: Primary | ICD-10-CM

## 2022-02-24 DIAGNOSIS — B96.89 BACTERIAL VAGINOSIS: Primary | ICD-10-CM

## 2022-02-24 LAB — FIBRINOGEN PPP-MCNC: 388 MG/DL (ref 227–495)

## 2022-02-24 PROCEDURE — 59025 FETAL NON-STRESS TEST: CPT | Performed by: STUDENT IN AN ORGANIZED HEALTH CARE EDUCATION/TRAINING PROGRAM

## 2022-02-24 PROCEDURE — 99214 OFFICE O/P EST MOD 30 MIN: CPT | Performed by: STUDENT IN AN ORGANIZED HEALTH CARE EDUCATION/TRAINING PROGRAM

## 2022-02-24 PROCEDURE — 76817 TRANSVAGINAL US OBSTETRIC: CPT

## 2022-02-24 PROCEDURE — 99284 EMERGENCY DEPT VISIT MOD MDM: CPT | Performed by: STUDENT IN AN ORGANIZED HEALTH CARE EDUCATION/TRAINING PROGRAM

## 2022-02-24 PROCEDURE — NC001 PR NO CHARGE: Performed by: STUDENT IN AN ORGANIZED HEALTH CARE EDUCATION/TRAINING PROGRAM

## 2022-02-24 PROCEDURE — 96361 HYDRATE IV INFUSION ADD-ON: CPT

## 2022-02-24 PROCEDURE — 99213 OFFICE O/P EST LOW 20 MIN: CPT

## 2022-02-24 RX ORDER — ACETAMINOPHEN 325 MG/1
650 TABLET ORAL EVERY 6 HOURS PRN
COMMUNITY
End: 2022-03-08 | Stop reason: SDUPTHER

## 2022-02-24 RX ORDER — METRONIDAZOLE 500 MG/1
500 TABLET ORAL EVERY 12 HOURS SCHEDULED
Qty: 14 TABLET | Refills: 0 | Status: SHIPPED | OUTPATIENT
Start: 2022-02-24 | End: 2022-03-03

## 2022-02-24 NOTE — ED PROVIDER NOTES
History  Chief Complaint   Patient presents with    Abdominal Pain Pregnant     reports lower abdominal pain since last night, 33 wks pregnant     Patient is a 26-year-old female,  currently 35 weeks gestation who presents emergency department today with concern for abdominal pain x2 days  Patient states pain is mainly located in the lower abdomen and radiates to her lower back  When asked this feels like her prior pregnancies during labor she states it feels similar  Patient also states both of her prior two deliveries were performed via  and more pre term  She also admits to associated dysuria but denies increased frequency urgency  She states she has been otherwise feeling normal fetal movements and denies vaginal bleeding discharge, gush of fluids, lightheadedness or dizziness, chest pain or shortness of breath, fever/chills, nausea vomiting, seizures  Prior to Admission Medications   Prescriptions Last Dose Informant Patient Reported? Taking? Miconazole 7 100 MG vaginal suppository   No No   Sig: INSERT 1 SUPPOSITORY (100 MG TOTAL) INTO THE VAGINA DAILY AT BEDTIME   Prenatal Vit-Fe Fumarate-FA (prenatal vitamin) 28-0 8 mg  Self No No   Sig: Take 1 tablet by mouth daily   aspirin (ECOTRIN LOW STRENGTH) 81 mg EC tablet  Self No No   Sig: Take 2 tablets (162 mg total) by mouth daily Stop at 36 weeks     ferrous sulfate 324 (65 Fe) mg  Self No No   Sig: Take 1 tablet (324 mg total) by mouth 2 (two) times a day before meals   folic acid (FOLVITE) 613 mcg tablet  Self No No   Sig: Take 1 tablet (400 mcg total) by mouth daily      Facility-Administered Medications: None       Past Medical History:   Diagnosis Date    Hypertension     with pregnancy    Migraine     blurry vision    Migraines     Varicella     vaccinated per pt        Past Surgical History:   Procedure Laterality Date     SECTION      COLPOSCOPY      OK CONIZATION CERVIX,KNIFE/LASER      WISDOM TOOTH EXTRACTION         Family History   Problem Relation Age of Onset    No Known Problems Mother     Heart disease Father     Hypertension Father     No Known Problems Sister     No Known Problems Brother     Asthma Daughter     Hypertension Paternal Grandmother     Cancer Paternal Grandfather     No Known Problems Daughter      I have reviewed and agree with the history as documented  E-Cigarette/Vaping    E-Cigarette Use Never User      E-Cigarette/Vaping Substances    Nicotine No     THC No     CBD No     Flavoring No     Other No     Unknown No      Social History     Tobacco Use    Smoking status: Never Smoker    Smokeless tobacco: Never Used   Vaping Use    Vaping Use: Never used   Substance Use Topics    Alcohol use: Not Currently    Drug use: Never       Review of Systems   Constitutional: Negative for chills and fever  HENT: Negative for congestion, ear pain and sore throat  Eyes: Negative for pain and visual disturbance  Respiratory: Negative for cough and shortness of breath  Cardiovascular: Negative for chest pain and palpitations  Gastrointestinal: Positive for abdominal pain  Negative for blood in stool, constipation, diarrhea, nausea and vomiting  Genitourinary: Positive for dysuria  Negative for frequency, hematuria, urgency, vaginal bleeding, vaginal discharge and vaginal pain  Musculoskeletal: Positive for back pain  Negative for arthralgias  Skin: Negative for color change and rash  Neurological: Negative for dizziness, seizures, weakness, light-headedness and headaches  All other systems reviewed and are negative  Physical Exam  Physical Exam  Vitals and nursing note reviewed  Exam conducted with a chaperone present (Kalpana Chaney)  Constitutional:       General: She is not in acute distress  Appearance: She is well-developed  She is not ill-appearing  HENT:      Head: Normocephalic and atraumatic        Nose: Nose normal       Mouth/Throat: Mouth: Mucous membranes are moist       Pharynx: Oropharynx is clear  Eyes:      Conjunctiva/sclera: Conjunctivae normal    Cardiovascular:      Rate and Rhythm: Normal rate and regular rhythm  Pulses:           Radial pulses are 2+ on the right side and 2+ on the left side  Posterior tibial pulses are 2+ on the right side and 2+ on the left side  Heart sounds: Normal heart sounds  No murmur heard  Pulmonary:      Effort: Pulmonary effort is normal  No respiratory distress  Breath sounds: Normal breath sounds  No decreased breath sounds, wheezing or rales  Abdominal:      General: Bowel sounds are normal       Palpations: Abdomen is soft  Tenderness: There is abdominal tenderness  There is no right CVA tenderness, left CVA tenderness, guarding or rebound  Hernia: No hernia is present  Genitourinary:     General: Normal vulva  Exam position: Lithotomy position  Comments: Sterile pelvic exam performed by Dr Olman Cota  Findings showed soft cervix with apparent posteriorly displaced cervix, cervical os is closed  Musculoskeletal:      Cervical back: Neck supple  Right lower leg: No edema  Left lower leg: No edema  Skin:     General: Skin is warm and dry  Neurological:      General: No focal deficit present  Mental Status: She is alert and oriented to person, place, and time  Sensory: Sensation is intact  Motor: Motor function is intact  Gait: Gait is intact  Psychiatric:         Attention and Perception: Attention normal          Mood and Affect: Mood normal          Speech: Speech normal          Behavior: Behavior normal  Behavior is cooperative  Thought Content:  Thought content normal          Judgment: Judgment normal          Vital Signs  ED Triage Vitals   Temp Pulse Respirations Blood Pressure SpO2   -- 02/23/22 2225 02/23/22 2225 02/23/22 2226 02/23/22 2225    100 18 119/69 100 %      Temp src Heart Rate Source Patient Position - Orthostatic VS BP Location FiO2 (%)   -- -- -- -- --             Pain Score       02/23/22 2225       6           Vitals:    02/23/22 2225 02/23/22 2226   BP:  119/69   Pulse: 100          Visual Acuity      ED Medications  Medications   lactated ringers infusion (125 mL/hr Intravenous New Bag 2/23/22 2325)       Diagnostic Studies  Results Reviewed     Procedure Component Value Units Date/Time    Comprehensive metabolic panel [[id number]]  (Abnormal) Collected: 02/23/22 2321    Lab Status: Final result Specimen: Blood from Arm, Right Updated: 02/23/22 2358     Sodium 135 mmol/L      Potassium 3 9 mmol/L      Chloride 101 mmol/L      CO2 25 mmol/L      ANION GAP 9 mmol/L      BUN 7 mg/dL      Creatinine 0 44 mg/dL      Glucose 90 mg/dL      Calcium 9 2 mg/dL      AST 15 U/L      ALT 11 U/L      Alkaline Phosphatase 94 4 U/L      Total Protein 6 5 g/dL      Albumin 3 5 g/dL      Total Bilirubin 0 29 mg/dL      eGFR 132 ml/min/1 73sq m     Narrative:      [place] guidelines for Chronic Kidney Disease (CKD):     Stage 1 with normal or high GFR (GFR > 90 mL/min/1 73 square meters)    Stage 2 Mild CKD (GFR = 60-89 mL/min/1 73 square meters)    Stage 3A Moderate CKD (GFR = 45-59 mL/min/1 73 square meters)    Stage 3B Moderate CKD (GFR = 30-44 mL/min/1 73 square meters)    Stage 4 Severe CKD (GFR = 15-29 mL/min/1 73 square meters)    Stage 5 End Stage CKD (GFR <15 mL/min/1 73 square meters)  Note: GFR calculation is accurate only with a steady state creatinine    Protime-INR [[id number]]  (Normal) Collected: 02/23/22 2321    Lab Status: Final result Specimen: Blood from Arm, Right Updated: 02/23/22 2335     Protime 13 0 seconds      INR 0 99    APTT [[id number]]  (Normal) Collected: 02/23/22 2321    Lab Status: Final result Specimen: Blood from Arm, Right Updated: 02/23/22 2335     PTT 27 seconds     CBC and differential [[id number]]  (Abnormal) Collected: 02/23/22 2321 Lab Status: Final result Specimen: Blood from Arm, Right Updated: 02/23/22 2326     WBC 10 21 Thousand/uL      RBC 3 74 Million/uL      Hemoglobin 10 5 g/dL      Hematocrit 31 3 %      MCV 84 fL      MCH 28 1 pg      MCHC 33 5 g/dL      RDW 13 2 %      MPV 10 9 fL      Platelets 239 Thousands/uL      nRBC 0 /100 WBCs      Neutrophils Relative 68 %      Immat GRANS % 1 %      Lymphocytes Relative 21 %      Monocytes Relative 9 %      Eosinophils Relative 1 %      Basophils Relative 0 %      Neutrophils Absolute 6 94 Thousands/µL      Immature Grans Absolute 0 08 Thousand/uL      Lymphocytes Absolute 2 10 Thousands/µL      Monocytes Absolute 0 94 Thousand/µL      Eosinophils Absolute 0 12 Thousand/µL      Basophils Absolute 0 03 Thousands/µL     UA w Reflex to Microscopic w Reflex to Culture [[id number]]  (Abnormal) Collected: 02/23/22 2315    Lab Status: Final result Specimen: Urine, Clean Catch Updated: 02/23/22 2322     Color, UA Yellow     Clarity, UA Clear     Specific Gravity, UA 1 015     pH, UA 7 0     Leukocytes, UA Negative     Nitrite, UA Negative     Protein, UA Negative mg/dl      Glucose, UA Negative mg/dl      Ketones, UA 15 (1+) mg/dl      Urobilinogen, UA 0 2 E U /dl      Bilirubin, UA Negative     Blood, UA Negative     URINE COMMENT --    Urine culture [[id number]] Collected: 02/23/22 2315    Lab Status: In process Specimen: Urine, Clean Catch Updated: 02/23/22 2322                 No orders to display              Procedures  Procedures         ED Course                               SBIRT 20yo+      Most Recent Value   SBIRT (25 yo +)    In order to provide better care to our patients, we are screening all of our patients for alcohol and drug use  Would it be okay to ask you these screening questions?  No Filed at: 02/23/2022 2301                    MDM  Number of Diagnoses or Management Options  33 weeks gestation of pregnancy  Abdominal pain during pregnancy  Diagnosis management comments: Patient is a 70-year-old female presents emergency department today currently 33 weeks gestation with complaint of abdominal pain  Examination showed tenderness to palpation of the lower abdomen; sterile pelvic exam performed by Dr Ralph Freitas and showed a soft cervix that is posterior displaced but no signs of cervical os dilation  Vital signs were stable and normal   Fetal heart tones at a rate of approximately 160 with good fetal movement on exam   Anyi Zavala, Dr Ziyad Barahona, who recommended transferring pt to [address] [place] [address] for further evaluation and to rule out  labor  Labs, UA and fluids ordered at Dr Ziyad Barahona request  Lab findings showed mild leukocytosis of 10 21 and mild anemia with hgb of 10 5  UA not concerning for UTI, cultures were sent  Pt was transferred out to [address] [place] [address] L&D under the care of Dr Ziyad Barahona  Patient verbalized understanding and agreement to plan of care, all patient's questions were answered, patient was stable at time of transfer  Amount and/or Complexity of Data Reviewed  Clinical lab tests: ordered and reviewed    Patient Progress  Patient progress: stable      Disposition  Final diagnoses:   Abdominal pain during pregnancy   33 weeks gestation of pregnancy     Time reflects when diagnosis was documented in both MDM as applicable and the Disposition within this note     Time User Action Codes Description Comment    2022 11:29 PM Hong Castellanos [O26 899,  R10 9] Abdominal pain during pregnancy     2022 11:30 PM Nora Juarez [Z3A 33] 33 weeks gestation of pregnancy       ED Disposition     ED Disposition Condition Date/Time Comment    Transfer to Another [employer]  Wed 2022 11:29 PM Neema Huizar should be transferred out to [address] [place] [address] (Dr Tutu Chopra)          MD Documentation      Most Recent Value   Patient Condition The patient has been stabilized such that within reasonable medical probability, no material deterioration of the patient condition or the condition of the unborn child(rah) is likely to result from the transfer   Reason for Transfer Level of Care needed not available at this facility   Benefits of Transfer Specialized equipment and/or services available at the receiving facility (Include comment)________________________, Continuity of care   Risks of Transfer Potential for delay in receiving treatment, Potential deterioration of medical condition, Loss of IV, Increased discomfort during transfer   Accepting Physician Dr Ruy Rand Name, [address]    (Name & Tel number) PACs   Sending MD Dr Michelle Menendez   Provider Certification General risk, such as traffic hazards, adverse weather conditions, rough terrain or turbulence, possible failure of equipment (including vehicle or aircraft), or consequences of actions of persons outside the control of the transport personnel, Unanticipated needs of medical equipment and personnel during transport, Risk of worsening condition      RN Documentation      [address] Name, [address]    (Name & Tel number) PACs      Follow-up Information    None         Discharge Medication List as of 2/24/2022 12:59 AM      CONTINUE these medications which have NOT CHANGED    Details   aspirin (ECOTRIN LOW STRENGTH) 81 mg EC tablet Take 2 tablets (162 mg total) by mouth daily Stop at 36 weeks  , Starting Mon 10/4/2021, Until Wed 2/2/2022, Normal      ferrous sulfate 324 (65 Fe) mg Take 1 tablet (324 mg total) by mouth 2 (two) times a day before meals, Starting Thu 60/1/0889, Normal      folic acid (FOLVITE) 966 mcg tablet Take 1 tablet (400 mcg total) by mouth daily, Starting Tue 9/7/2021, Normal      Miconazole 7 100 MG vaginal suppository INSERT 1 SUPPOSITORY (100 MG TOTAL) INTO THE VAGINA DAILY AT BEDTIME, Normal      Prenatal Vit-Fe Fumarate-FA (prenatal vitamin) 28-0 8 mg Take 1 tablet by mouth daily, Starting Tue 9/7/2021, Until Tue 2/22/2022, Normal             No discharge procedures on file      PDMP Review     None          ED Provider  Electronically Signed by           Higinio Gimenez PA-C  02/24/22 2466

## 2022-02-24 NOTE — DISCHARGE INSTR - AVS FIRST PAGE
Patient instructed to call if experiencing worsening contractions, vaginal bleeding, loss of fluid or decreased fetal movement

## 2022-02-24 NOTE — PROCEDURES
Melvin López, a [id number] at 33w1d with an DALIA of 4/13/2022, Date entered prior to episode creation, was seen at [address] for the following procedure(s): $Procedure Type: US - Transvaginal]                   Ultrasound Other  Fetal Presentation: Vertex  Cervical Length: 4 19  Funnel: No  Placenta Previa: No  Vasa Previa: No             Ultrasound Probe Disinfection    A transvaginal ultrasound was performed     Prior to use, disinfection was performed with High Level Disinfection Process (Trophon)      Morgan Calderon MD  02/24/22  3:51 AM

## 2022-02-24 NOTE — DISCHARGE INSTRUCTIONS
Contracciones de [place]   LO QUE NECESITA SABER:   Las contracciones de [place] son cuando los músculos de hernandez útero (Kimberly Salt) se contraen y comprimen chandler el Rhiannon Kobus  Los músculos uterinos controlan el útero  Las contracciones de [place] se detienen solas  No son contracciones verdaderas de parto y no provocan que hernandez michelle uterino (apertura de hernandez útero) se dilate (se gino)  INSTRUCCIONES SOBRE EL JESSICA HOSPITALARIA:   Busque atención médica de inmediato si:  · Usted tiene sangrado vaginal     · Usted tiene líquido goteando de hernandez vagina que no desaparece  · Usted siente un chorro de líquido que sale de hernandez vagina  · Lynsey contracciones ocurren cada 5 minutos o menos, y tomlinson por más de 60 segundos  · Lyn[place] contracciones comienzan a sentirse más aimee o más dolorosas  · Usted siente un cambio en el movimiento de hernandez bebé o siente menos de 6 a 10 movimientos en declan hora  Llame a hernandez médico u obstetra si:  · Tiene fiebre  · Usted tiene preguntas o inquietudes acerca de hernandez condición o cuidado  Medicamentos:  · [employer] , elle los sedantes, podrían ayudar a los músculos de hernandez útero  Hernandez médico podría administrarle medicamento para aliviar la molestia o el dolor  · [place] lynsey medicamentos elle se le haya indicado  Consulte con hernandez médico si usted neida que hernandez medicamento no le está ayudando o si presenta efectos secundarios  Infórmele si es alérgico a cualquier medicamento  Mantenga declan lista actualizada de los [place], las vitaminas y los productos herbales que ga  Incluya los siguientes datos de los medicamentos: cantidad, frecuencia y motivo de administración  Traiga con usted la lista o los envases de las píldoras a lynsey citas de seguimiento  Lleve la lista de los medicamentos con usted en michelle de declan emergencia  Cuidados personales:  · [place] hernandez actividad o hernandez posición cuando sienta que las contracciones Katheren Carrie a comenzar  Camine si usted pablo estado acostada o sentada  Recuéstese si ha estado stewart o caminando  El parto real no se detendrá por cambiar nair posición o nair actividad  · [place] un baño tibio para relajar nair cuerpo  · [place] más líquidos para evitar la deshidratación  Pregunte cuánto líquido debe ewa cada día y cuáles líquidos son los más adecuados para usted  · Practique la respiración de parto para disminuir nair molestia  [place] podría ayudarla a alistarse para el parto real  [place] respiraciones lentas y profundas o rápidas y cortas  Pregunte a nair médico cómo practicar la respiración de parto  Acuda a lynsey consultas de control con nair médico u obstetra según le indicaron: Anote lynsey preguntas para que se acuerde de hacerlas chandler lynsey visitas  © Copyright [employer] 2021 Information is for End User's use only and may not be sold, redistributed or otherwise used for commercial purposes  All illustrations and images included in CareNotes® are the copyrighted property of [employer]  or [address] [place] [address] es sólo para uso en educación  Nair intención no es darle un consejo médico sobre enfermedades o tratamientos  Colsulte con nair Yakelni Bjornstad farmacéutico antes de seguir cualquier régimen médico para saber si es seguro y efectivo para usted

## 2022-02-24 NOTE — ED NOTES
[facility] L+D triage   Accepting: Narayan Solomon 0045   Report: [id number]     Michael Castle RN  02/23/22 7555

## 2022-02-24 NOTE — EMTALA/ACUTE CARE TRANSFER
[facility] EMERGENCY DEPARTMENT  [address] [facility] [address]  Dept: [phone]      EMTALA TRANSFER CONSENT    NAME Neema Rowe                                         1990                              MRN [id number]    I have been informed of my rights regarding examination, treatment, and transfer   by Dr Sonny Denney MD    Benefits: Specialized equipment and/or services available at the receiving facility (Include comment)________________________,Continuity of care    Risks: Potential for delay in receiving treatment,Potential deterioration of medical condition,Loss of IV,Increased discomfort during transfer      Consent for Transfer:  I acknowledge that my medical condition has been evaluated and explained to me by the emergency department physician or other qualified medical person and/or my attending physician, who has recommended that I be transferred to the service of  Accepting Physician: Dr Dallas Oneal at [address] Name, [address] : [employer]  The above potential benefits of such transfer, the potential risks associated with such transfer, and the probable risks of not being transferred have been explained to me, and I fully understand them  The doctor has explained that, in my case, the benefits of transfer outweigh the risks  I agree to be transferred  I authorize the performance of emergency medical procedures and treatments upon me in both transit and upon arrival at the receiving facility  Additionally, I authorize the release of any and all medical records to the receiving facility and request they be transported with me, if possible  I understand that the safest mode of transportation during a medical emergency is an ambulance and that the Hospital advocates the use of this mode of transport   Risks of traveling to the receiving facility by car, including absence of medical control, life sustaining equipment, such as oxygen, and medical personnel has been explained to me and I fully understand them  (VICENTE CORRECT BOX BELOW)  [  ]  I consent to the stated transfer and to be transported by ambulance/helicopter  [  ]  I consent to the stated transfer, but refuse transportation by ambulance and accept full responsibility for my transportation by car  I understand the risks of non-ambulance transfers and I exonerate the Hospital and its staff from any deterioration in my condition that results from this refusal     X___________________________________________    DATE  22  TIME________  Signature of patient or legally responsible individual signing on patient behalf           RELATIONSHIP TO PATIENT_________________________          Provider Certification    NAME Neema Barros Rater                                         1990                              MRN [id number]    A medical screening exam was performed on the above named patient  Based on the examination:    Condition Necessitating Transfer The primary encounter diagnosis was Abdominal pain during pregnancy  A diagnosis of 33 weeks gestation of pregnancy was also pertinent to this visit      Patient Condition: The patient has been stabilized such that within reasonable medical probability, no material deterioration of the patient condition or the condition of the unborn child(rah) is likely to result from the transfer    Reason for Transfer: Level of Care needed not available at this facility    Transfer Requirements: [place]   · Space available and qualified personnel available for treatment as acknowledged by PACs  · Agreed to accept transfer and to provide appropriate medical treatment as acknowledged by       Dr Aung Clark  · Appropriate medical records of the examination and treatment of the patient are provided at the time of transfer   [address] _______  · Transfer will be performed by qualified personnel from    and appropriate transfer equipment as required, including the use of necessary and appropriate life support measures  Provider Certification: I have examined the patient and explained the following risks and benefits of being transferred/refusing transfer to the patient/family:  General risk, such as traffic hazards, adverse weather conditions, rough terrain or turbulence, possible failure of equipment (including vehicle or aircraft), or consequences of actions of persons outside the control of the transport personnel,Unanticipated needs of medical equipment and personnel during transport,Risk of worsening condition      Based on these reasonable risks and benefits to the patient and/or the unborn child(rah), and based upon the information available at the time of the patients examination, I certify that the medical benefits reasonably to be expected from the provision of appropriate medical treatments at another medical facility outweigh the increasing risks, if any, to the individuals medical condition, and in the case of labor to the unborn child, from effecting the transfer      X____________________________________________ DATE 02/23/22        TIME_______      ORIGINAL - SEND TO MEDICAL RECORDS   COPY - SEND WITH PATIENT DURING TRANSFER

## 2022-02-24 NOTE — PROGRESS NOTES
L&D Triage Note - OB/GYN  Neema Platt 27 y o  female MRN: [id number]  Unit/Bed#:  TRIAGE  Encounter: [id number]        Patient is seen by Lorena John    ASSESSMENT/PLAN  [address] Damico is a 27 y o  [id number] at 33w1d with history of  labor and pre-eclampsia in previous pregnancies presenting with lower abdominal pain and cramping  1) Abdominal pain in third trimester:  - R/O  Labor  · TVUS  · Cervical length of 4 19cm  · Speculum exam  · Negative for any masses, lesions, excoriations, erythema or tenderness  Normal appearing vaginal mucosa  No blood or discharge observed  · [address]:  · Innumerable clue cells present  No signs of membrane rupture  · Antepartum Fetal Monitoring:  · NST reactive and reassuring    - Labs:  · CBC, CMP, U/A and coagulation panel ordered by [facility]; all wnl for pregnancy  Urine culture and fibrinogen were ordered but results are pending     - Medications:  · Evaluation revealed BV infection  Patient was discharged on 7 day course of metronidazole 500mg BID  2)  Discharge instructions  - Evaluation points to uterine contractions as the most likely cause of the patient's pain  She was instructed to use tylenol and warm compress/heating pad for her abdominal cramping  Patient should seek care if pain is too severe, persistent, or unresponsive to medications  She was also instructed to continue taking ASA for pre-eclampsia prophylaxis  She was oriented on BV infection and discharged on the appropriate antibiotic regimen   - Patient instructed to call if experiencing worsening contractions, vaginal bleeding, loss of fluid or decreased fetal movement  - Will follow up with OBGYN in office    D/w Dr Jesus Stearns  Patient examined with Dr Ileana Royal  ______________    SUBJECTIVE    DALIA: Estimated Date of Delivery: 22    HPI:  27 y o  [id number] 33w1d presents with complaint of abdominal pain of 2 days duration  Patient states that she had intense lower abdominal pain that woke her from sleep  The pain is described as cramping and it waxes and wanes  She cannot identify any triggers, and its unrelated to food or movement  Associated symptoms: frequency and back pain  Contractions: Yes  Leakage of fluid: no  Vaginal Bleeding: no  Fetal movement: present    ROS:  Review of Systems   Constitutional: Negative for chills and fever  HENT: Negative for congestion and sore throat  Eyes: Negative for photophobia and visual disturbance  Respiratory: Negative for cough, chest tightness and shortness of breath  Cardiovascular: Negative for chest pain and palpitations  Gastrointestinal: Negative for abdominal pain, blood in stool, diarrhea, nausea and vomiting  Genitourinary: Positive for frequency and pelvic pain  Negative for dysuria, hematuria, vaginal bleeding and vaginal discharge  Musculoskeletal: Positive for back pain  Negative for neck pain  Skin: Negative  Negative for rash  Neurological: Negative for syncope, light-headedness and headaches  Her obstetrical history is significant for:  Patient Active Problem List   Diagnosis    Pregnancy with history of  section, antepartum    Hx of preeclampsia, prior pregnancy, currently pregnant, third trimester    History of  delivery    H/O LEEP    33 weeks gestation of pregnancy    Contraception management       OBJECTIVE:  /77   Pulse 93   Temp 98 3 °F (36 8 °C) (Oral)   Resp 18   Ht 5' 4" (1 626 m)   Wt 71 7 kg (158 lb)   LMP 2021 (Approximate)   BMI 27 12 kg/m²   Body mass index is 27 12 kg/m²  Physical Exam  Constitutional:       General: She is not in acute distress  Appearance: Normal appearance  Cardiovascular:      Rate and Rhythm: Normal rate and regular rhythm  Heart sounds: Normal heart sounds  Pulmonary:      Effort: Pulmonary effort is normal  No respiratory distress        Breath sounds: Normal breath sounds  Abdominal:      Palpations: Abdomen is soft  Tenderness: There is abdominal tenderness in the right upper quadrant, right lower quadrant and suprapubic area  Comments: gravid   Neurological:      Mental Status: She is alert         Labs:   Recent Results (from the past 24 hour(s))   UA w Reflex to Microscopic w Reflex to Culture    Collection Time: 02/23/22 11:15 PM    Specimen: Urine, Clean Catch   Result Value Ref Range    Color, UA Yellow Yellow    Clarity, UA Clear Clear    Specific Gravity, UA 1 015 1 001 - 1 030    pH, UA 7 0 5 0, 5 5, 6 0, 6 5, 7 0, 7 5, 8 0    Leukocytes, UA Negative Negative    Nitrite, UA Negative Negative    Protein, UA Negative Negative, Interference- unable to analyze mg/dl    Glucose, UA Negative Negative mg/dl    Ketones, UA 15 (1+) (A) Negative mg/dl    Urobilinogen, UA 0 2 0 2, 1 0 E U /dl E U /dl    Bilirubin, UA Negative Negative    Blood, UA Negative Negative    URINE COMMENT     CBC and differential    Collection Time: 02/23/22 11:21 PM   Result Value Ref Range    WBC 10 21 (H) 4 31 - 10 16 Thousand/uL    RBC 3 74 (L) 3 81 - 5 12 Million/uL    Hemoglobin 10 5 (L) 11 5 - 15 4 g/dL    Hematocrit 31 3 (L) 34 8 - 46 1 %    MCV 84 82 - 98 fL    MCH 28 1 26 8 - 34 3 pg    MCHC 33 5 31 4 - 37 4 g/dL    RDW 13 2 11 6 - 15 1 %    MPV 10 9 8 9 - 12 7 fL    Platelets 362 236 - 951 Thousands/uL    nRBC 0 /100 WBCs    Neutrophils Relative 68 43 - 75 %    Immat GRANS % 1 0 - 2 %    Lymphocytes Relative 21 14 - 44 %    Monocytes Relative 9 4 - 12 %    Eosinophils Relative 1 0 - 6 %    Basophils Relative 0 0 - 1 %    Neutrophils Absolute 6 94 1 85 - 7 62 Thousands/µL    Immature Grans Absolute 0 08 0 00 - 0 20 Thousand/uL    Lymphocytes Absolute 2 10 0 60 - 4 47 Thousands/µL    Monocytes Absolute 0 94 0 17 - 1 22 Thousand/µL    Eosinophils Absolute 0 12 0 00 - 0 61 Thousand/µL    Basophils Absolute 0 03 0 00 - 0 10 Thousands/µL   Comprehensive metabolic panel    Collection Time: 02/23/22 11:21 PM   Result Value Ref Range    Sodium 135 133 - 145 mmol/L    Potassium 3 9 3 5 - 5 0 mmol/L    Chloride 101 96 - 108 mmol/L    CO2 25 22 - 33 mmol/L    ANION GAP 9 4 - 13 mmol/L    BUN 7 6 - 20 mg/dL    Creatinine 0 44 0 40 - 1 10 mg/dL    Glucose 90 65 - 140 mg/dL    Calcium 9 2 8 4 - 10 2 mg/dL    AST 15 15 - 41 U/L    ALT 11 5 - 54 U/L    Alkaline Phosphatase 94 4 35 - 140 U/L    Total Protein 6 5 6 4 - 8 3 g/dL    Albumin 3 5 3 4 - 4 8 g/dL    Total Bilirubin 0 29 (L) 0 30 - 1 20 mg/dL    eGFR 132 ml/min/1 73sq m   Protime-INR    Collection Time: 02/23/22 11:21 PM   Result Value Ref Range    Protime 13 0 11 6 - 14 5 seconds    INR 0 99 0 84 - 1 19   APTT    Collection Time: 02/23/22 11:21 PM   Result Value Ref Range    PTT 27 23 - 37 seconds       - Speculum exam:  negative for any masses, lesions, excoriations, erythema or tenderness  Normal appearing vaginal mucosa  No blood or discharge observed  [address]:   Infection   - Innumerable clue cells    - No hyphae   - No trichomonads present   Membrane status   - No ferning   - Nitrazine negative   - No pooling     SVE: Performed at [facility], patient was closed, thick and posterior  She declined repeat SVE in triage  FHT:  Baseline Rate: 135 bpm  Variability: Moderate 6-25 bpm  Accelerations: 15 x 15 or greater  Decelerations: None  FHR Category: Category I    TOCO:   Contraction Frequency (minutes): 1 ctx  Contraction Duration (seconds): 80  Contraction Quality: Mild    IMAGING:       TVUS   Cervical length         - 4 19cm   Presentation: vertex      Ammon Nixon MD  Family Medicine PGY-1  2/24/2022  5:26 AM      Portions of the record may have been created with voice recognition software  Occasional wrong word or "sound a like" substitutions may have occurred due to the inherent limitations of voice recognition software    Read the chart carefully and recognize, using context, where substitutions have occurred

## 2022-02-25 LAB — BACTERIA UR CULT: NORMAL

## 2022-03-05 ENCOUNTER — HOSPITAL ENCOUNTER (OUTPATIENT)
Facility: HOSPITAL | Age: 32
Discharge: HOME | End: 2022-03-05
Attending: OBSTETRICS & GYNECOLOGY | Admitting: STUDENT IN AN ORGANIZED HEALTH CARE EDUCATION/TRAINING PROGRAM
Payer: COMMERCIAL

## 2022-03-05 VITALS
TEMPERATURE: 98.3 F | BODY MASS INDEX: 27.31 KG/M2 | WEIGHT: 160 LBS | SYSTOLIC BLOOD PRESSURE: 125 MMHG | RESPIRATION RATE: 20 BRPM | HEART RATE: 101 BPM | DIASTOLIC BLOOD PRESSURE: 83 MMHG | HEIGHT: 64 IN | OXYGEN SATURATION: 99 %

## 2022-03-05 LAB
ALBUMIN SERPL BCP-MCNC: 2.8 G/DL (ref 3.5–5)
ALP SERPL-CCNC: 120 U/L (ref 46–116)
ALT SERPL W P-5'-P-CCNC: 18 U/L (ref 12–78)
ANION GAP SERPL CALCULATED.3IONS-SCNC: 11 MMOL/L (ref 4–13)
AST SERPL W P-5'-P-CCNC: 17 U/L (ref 5–45)
BACTERIA UR QL AUTO: ABNORMAL /HPF
BASOPHILS # BLD AUTO: 0.02 THOUSANDS/ΜL (ref 0–0.1)
BASOPHILS NFR BLD AUTO: 0 % (ref 0–1)
BILIRUB SERPL-MCNC: 0.26 MG/DL (ref 0.2–1)
BILIRUB UR QL STRIP: NEGATIVE
BUN SERPL-MCNC: 6 MG/DL (ref 5–25)
CALCIUM ALBUM COR SERPL-MCNC: 9.9 MG/DL (ref 8.3–10.1)
CALCIUM SERPL-MCNC: 8.9 MG/DL (ref 8.3–10.1)
CHLORIDE SERPL-SCNC: 100 MMOL/L (ref 100–108)
CLARITY UR: CLEAR
CO2 SERPL-SCNC: 23 MMOL/L (ref 21–32)
COLOR UR: YELLOW
CREAT SERPL-MCNC: 0.37 MG/DL (ref 0.6–1.3)
CREAT UR-MCNC: 166 MG/DL
EOSINOPHIL # BLD AUTO: 0.08 THOUSAND/ΜL (ref 0–0.61)
EOSINOPHIL NFR BLD AUTO: 1 % (ref 0–6)
ERYTHROCYTE [DISTWIDTH] IN BLOOD BY AUTOMATED COUNT: 13.4 % (ref 11.6–15.1)
GFR SERPL CREATININE-BSD FRML MDRD: 139 ML/MIN/1.73SQ M
GLUCOSE SERPL-MCNC: 83 MG/DL (ref 65–140)
GLUCOSE UR STRIP-MCNC: NEGATIVE MG/DL
HCT VFR BLD AUTO: 31.2 % (ref 34.8–46.1)
HGB BLD-MCNC: 10.2 G/DL (ref 11.5–15.4)
HGB UR QL STRIP.AUTO: NEGATIVE
IMM GRANULOCYTES # BLD AUTO: 0.1 THOUSAND/UL (ref 0–0.2)
IMM GRANULOCYTES NFR BLD AUTO: 1 % (ref 0–2)
KETONES UR STRIP-MCNC: NEGATIVE MG/DL
LEUKOCYTE ESTERASE UR QL STRIP: NEGATIVE
LYMPHOCYTES # BLD AUTO: 1.65 THOUSANDS/ΜL (ref 0.6–4.47)
LYMPHOCYTES NFR BLD AUTO: 18 % (ref 14–44)
MCH RBC QN AUTO: 28 PG (ref 26.8–34.3)
MCHC RBC AUTO-ENTMCNC: 32.7 G/DL (ref 31.4–37.4)
MCV RBC AUTO: 86 FL (ref 82–98)
MONOCYTES # BLD AUTO: 1.01 THOUSAND/ΜL (ref 0.17–1.22)
MONOCYTES NFR BLD AUTO: 11 % (ref 4–12)
NEUTROPHILS # BLD AUTO: 6.43 THOUSANDS/ΜL (ref 1.85–7.62)
NEUTS SEG NFR BLD AUTO: 69 % (ref 43–75)
NITRITE UR QL STRIP: NEGATIVE
NON-SQ EPI CELLS URNS QL MICRO: ABNORMAL /HPF
NRBC BLD AUTO-RTO: 0 /100 WBCS
PH UR STRIP.AUTO: 6 [PH]
PLATELET # BLD AUTO: 154 THOUSANDS/UL (ref 149–390)
PMV BLD AUTO: 11.3 FL (ref 8.9–12.7)
POTASSIUM SERPL-SCNC: 3.7 MMOL/L (ref 3.5–5.3)
PROT SERPL-MCNC: 6.7 G/DL (ref 6.4–8.2)
PROT UR STRIP-MCNC: ABNORMAL MG/DL
PROT UR-MCNC: 30 MG/DL
PROT/CREAT UR: 0.18 MG/G{CREAT} (ref 0–0.1)
RBC # BLD AUTO: 3.64 MILLION/UL (ref 3.81–5.12)
RBC #/AREA URNS AUTO: ABNORMAL /HPF
SODIUM SERPL-SCNC: 134 MMOL/L (ref 136–145)
SP GR UR STRIP.AUTO: >=1.03 (ref 1–1.03)
UROBILINOGEN UR QL STRIP.AUTO: 0.2 E.U./DL
WBC # BLD AUTO: 9.29 THOUSAND/UL (ref 4.31–10.16)
WBC #/AREA URNS AUTO: ABNORMAL /HPF

## 2022-03-05 PROCEDURE — 99214 OFFICE O/P EST MOD 30 MIN: CPT

## 2022-03-05 PROCEDURE — 85025 COMPLETE CBC W/AUTO DIFF WBC: CPT | Performed by: OBSTETRICS & GYNECOLOGY

## 2022-03-05 PROCEDURE — 81001 URINALYSIS AUTO W/SCOPE: CPT | Performed by: OBSTETRICS & GYNECOLOGY

## 2022-03-05 PROCEDURE — 84156 ASSAY OF PROTEIN URINE: CPT | Performed by: OBSTETRICS & GYNECOLOGY

## 2022-03-05 PROCEDURE — 82570 ASSAY OF URINE CREATININE: CPT | Performed by: OBSTETRICS & GYNECOLOGY

## 2022-03-05 PROCEDURE — 80053 COMPREHEN METABOLIC PANEL: CPT | Performed by: OBSTETRICS & GYNECOLOGY

## 2022-03-05 PROCEDURE — NC001 PR NO CHARGE: Performed by: STUDENT IN AN ORGANIZED HEALTH CARE EDUCATION/TRAINING PROGRAM

## 2022-03-05 RX ORDER — ACETAMINOPHEN 325 MG/1
650 TABLET ORAL EVERY 6 HOURS PRN
Status: DISCONTINUED | OUTPATIENT
Start: 2022-03-05 | End: 2022-03-05 | Stop reason: HOSPADM

## 2022-03-05 RX ADMIN — ACETAMINOPHEN 650 MG: 325 TABLET, FILM COATED ORAL at 20:53

## 2022-03-06 NOTE — PROGRESS NOTES
Triage Note - OB  Neema Lopez Norman 27 y o  female MRN: [id number]  Unit/Bed#:  TRIAGE 2 Encounter: [id number]    OB TRIAGE NOTE  Neema Lopez Norman  [id number]  3/5/2022  9:15 PM  LD TRIAGE 2/ TRIAGE 2-*    ASSESS:  27 y o  [id number] 34w3d with abdominal pain, patient states contraction q 5 minutes  Since 2 weeks ago  I have discussed with patient what is the nature of her contractions,  She states bilateral inguinal pain  Denies vaginal bleeding LEF or decrease fetal movement  I have discussed with patient if she have labor before but she states had 2 previous  sections due to preeclampsia so in unsure if what she consider contractions are labor contractions  In addition patient states she feel dizzy and "odd' she states her symptoms are similar to the ones she experienced before when she was diagnosed with preE  Denies RUQ pain or headache  Pregnancy complicated by pree in her 2 previous pregnancies, she had c/s x2 and desire a tubal ligation in current pregnancy  Elevates 1H GTT (havent performed 3 h GTT) Last EFW 22  84%, AC 89%, Vertex, placenta anterior  GBS unknown  RH pos    PLAN  Active labor ruled out  SVE at admission C/T/H, pretty posterior  unchanged since triage visit 2 weeks ago  [place] with uterine silence, soft abdomen, no pain on pressure of previous surgical incision  NST reactive, moderate variability, Accelerations present, no deceleration (variable, late or early) after 2 hours of monitory    No vaginal bleeding, decrease fetal movement or leakage of fluid       · Pree ruled out  Normotensive during her pregnancy, and today but due to patient complains labs have been obtained  · Pre-E labs, hemoglobin 10 2 platelets 162, AST 91WWV 18, creatinine 0 37, protein creatinine ratio: 0 18  · At this moment patient no meet criteria for any hypertensive disorder of pregnancy  · Normal neurologic examination  · Patient instructed to call if experiencing worsening contractions, vaginal bleeding, loss of fluid, decreased fetal movement, right upper quadrant abdominal pain, change in her vision or headache that do not improve with tylenol   D/w Dr Amando Johnson  ______________    SUBJECTIVE    DALIA: Estimated Date of Delivery: 22    HPI Chronology:  27 y o  Neeta Norman presents with complaint of abdominal pain, patient states contraction q 5 minutes  Since 2 weeks ago  I have discussed with patient what is the nature of her contractions,  She states bilateral inguinal pain  Denies vaginal bleeding LEF or decrease fetal movement  I have discussed with patient if she have labor before but she states had 2 previous  sections due to preeclampsia so in unsure if what she consider contractions are labor contractions  In addition patient states she feel dizzy and "odd' she states her symptoms are similar to the ones she experienced before when she was diagnosed with preE  Denies RUQ pain or headache  Pregnancy complicated by pree in her 2 previous pregnancies, she had c/s x2 and desire a tubal ligation in current pregnancy  Elevates 1H GTT (havent performed 3 h GTT) Last EFW 22  84%, AC 89%, Vertex, placenta anterior  GBS unknown  RH pos      Contractions: yes  Leakage: no  Bleeding: no  Fetal Movement: yes  Pelvic pain: yes    Vitals:   /83 (Patient Position: Sitting)   Pulse 101   Temp 98 3 °F (36 8 °C) (Oral)   Resp 20   Ht 5' 4" (1 626 m)   Wt 72 6 kg (160 lb)   LMP 2021 (Approximate)   SpO2 99%   BMI 27 46 kg/m²   Body mass index is 27 46 kg/m²  Review of Systems   Constitutional: Negative for chills and fever  HENT: Negative for ear pain and sore throat  Eyes: Negative for pain and visual disturbance  Respiratory: Negative for cough and shortness of breath  Cardiovascular: Negative for chest pain and palpitations  Gastrointestinal: Negative for abdominal pain and vomiting     Genitourinary: Negative for dysuria and hematuria  Musculoskeletal: Negative for arthralgias and back pain  Skin: Negative for color change and rash  Neurological: Negative for seizures and syncope  All other systems reviewed and are negative  Physical Exam  Constitutional:       Appearance: She is well-developed  HENT:      Head: Normocephalic and atraumatic  Eyes:      Conjunctiva/sclera: Conjunctivae normal       Pupils: Pupils are equal, round, and reactive to light  Cardiovascular:      Rate and Rhythm: Normal rate and regular rhythm  Heart sounds: Normal heart sounds  Pulmonary:      Effort: Pulmonary effort is normal       Breath sounds: Normal breath sounds  Abdominal:      General: Bowel sounds are normal       Palpations: Abdomen is soft  Genitourinary:     Vagina: Normal    Musculoskeletal:         General: Normal range of motion  Cervical back: Normal range of motion and neck supple  Skin:     General: Skin is warm  Neurological:      Mental Status: She is alert and oriented to person, place, and time           FHT:  Baseline Rate: 140 bpm  Variability: Moderate 6-25 bpm  Accelerations: 15 x 15 or greater  Decelerations: None  FHR Category: Category I  TOCO:   Contraction Frequency (minutes): irritibility/7-8  Contraction Duration (seconds): 60-80  Contraction Quality: Mild    Cervical Dilation: Closed  Cervical Effacement: 0  Cervical Consistency: Firm  Fetal Station: Ballotable  Method: Manual  OB Examiner: Sparkle Evans:   Recent Results (from the past 24 hour(s))   CBC and differential    Collection Time: 03/05/22  8:41 PM   Result Value Ref Range    WBC 9 29 4 31 - 10 16 Thousand/uL    RBC 3 64 (L) 3 81 - 5 12 Million/uL    Hemoglobin 10 2 (L) 11 5 - 15 4 g/dL    Hematocrit 31 2 (L) 34 8 - 46 1 %    MCV 86 82 - 98 fL    MCH 28 0 26 8 - 34 3 pg    MCHC 32 7 31 4 - 37 4 g/dL    RDW 13 4 11 6 - 15 1 %    MPV 11 3 8 9 - 12 7 fL    Platelets 287 716 - 731 Thousands/uL    nRBC 0 /100 WBCs Neutrophils Relative 69 43 - 75 %    Immat GRANS % 1 0 - 2 %    Lymphocytes Relative 18 14 - 44 %    Monocytes Relative 11 4 - 12 %    Eosinophils Relative 1 0 - 6 %    Basophils Relative 0 0 - 1 %    Neutrophils Absolute 6 43 1 85 - 7 62 Thousands/µL    Immature Grans Absolute 0 10 0 00 - 0 20 Thousand/uL    Lymphocytes Absolute 1 65 0 60 - 4 47 Thousands/µL    Monocytes Absolute 1 01 0 17 - 1 22 Thousand/µL    Eosinophils Absolute 0 08 0 00 - 0 61 Thousand/µL    Basophils Absolute 0 02 0 00 - 0 10 Thousands/µL   Comprehensive metabolic panel    Collection Time: 03/05/22  8:41 PM   Result Value Ref Range    Sodium 134 (L) 136 - 145 mmol/L    Potassium 3 7 3 5 - 5 3 mmol/L    Chloride 100 100 - 108 mmol/L    CO2 23 21 - 32 mmol/L    ANION GAP 11 4 - 13 mmol/L    BUN 6 5 - 25 mg/dL    Creatinine 0 37 (L) 0 60 - 1 30 mg/dL    Glucose 83 65 - 140 mg/dL    Calcium 8 9 8 3 - 10 1 mg/dL    Corrected Calcium 9 9 8 3 - 10 1 mg/dL    AST 17 5 - 45 U/L    ALT 18 12 - 78 U/L    Alkaline Phosphatase 120 (H) 46 - 116 U/L    Total Protein 6 7 6 4 - 8 2 g/dL    Albumin 2 8 (L) 3 5 - 5 0 g/dL    Total Bilirubin 0 26 0 20 - 1 00 mg/dL    eGFR 139 ml/min/1 73sq m   Protein / creatinine ratio, urine    Collection Time: 03/05/22  8:41 PM   Result Value Ref Range    Creatinine, Ur 166 0 mg/dL    Protein Urine Random 30 mg/dL    Prot/Creat Ratio, Ur 0 18 (H) 0 00 - 0 10   UA w Reflex to Microscopic w Reflex to Culture    Collection Time: 03/05/22  8:41 PM    Specimen: Urine, Clean Catch   Result Value Ref Range    Color, UA Yellow     Clarity, UA Clear     Specific Gravity, UA >=1 030 1 003 - 1 030    pH, UA 6 0 4 5, 5 0, 5 5, 6 0, 6 5, 7 0, 7 5, 8 0    Leukocytes, UA Negative Negative    Nitrite, UA Negative Negative    Protein, UA Trace (A) Negative mg/dl    Glucose, UA Negative Negative mg/dl    Ketones, UA Negative Negative mg/dl    Urobilinogen, UA 0 2 0 2, 1 0 E U /dl E U /dl    Bilirubin, UA Negative Negative    Blood, UA Negative Negative       Lab, Imaging and other studies: I have personally reviewed pertinent reports      Jose Alberto Harrell MD    3/5/2022  9:15 PM

## 2022-03-06 NOTE — DISCHARGE INSTRUCTIONS
Trabajo de parto prematuro   LO QUE NECESITA SABER:   ¿Qué es el [place] de parto prematuro? Un trabajo de parto prematuro ocurre cuando el útero se contrae y el cérvix se abre antes de lo normal  El cérvix es la apertura del Monet Canard  El trabajo de parto prematuro ocurre después de la semana 20 conor antes de la semana 40 de [place]  [place] se conoce elle rotura prematura de membranas (RPM)  La rotura prematura de [employer] significa que rompió radha antes de que comenzara el Vie[place] de Charlene  El [place] de parto prematuro podría provocar que tenga al bebé antes de que esté listo para nacer  ¿Qué provoca el [place] de [place] prematuro? Algunas veces la causa se desconoce  Lo siguiente podría provocar un [place] de parto prematuro:  · Un útero iker o un cérvix corto puede provocar que usted entre en [place] de parto antes de [place]  · Declan enfermedad crónica elle la presión arterial xochitl, diabetes o la obesidad puede causar el trabajo de [place]  · Declan infección elle declan infección del tracto urinario o infección vaginal puede debilitar las membranas (revestimiento) del saco amniótico alrededor del bebé  [place] podría conllevar a declan ruptura prematura de las [employer] y a un trabajo de parto prematuro  · Dificultades con la placenta elle placenta previa o desprendimiento de placenta, podrían provocar un trabajo de parto prematuro  · Debara Li a nair abdomen o útero podría también causar algunos casos de trabajo de parto prematuro  ¿Qué aumenta mi riesgo de [place] de parto prematuro? · Usted tiene un embarazo de 2 o más bebés  · Usted es mauricio de 16 o mayor de 26 años de edad  · Usted se embaraza de nuevo en menos de 6 meses de tener al último bebé  · Usted tuvo un trabajo de parto o parto prematuro previo  · Usted no recibió cuidado prenatal     · Usted fuma, ga alcohol o [place] drogas chandler el embarazo      · Nair peso está por debajo de lo normal  El aumentar [place] [address] de peso chandler el embarazo también podría aumentar el riesgo de trabajo de parto prematuro  ¿Cuáles son los signos y síntomas de un trabajo de parto prematuro? Es posible que usted no sepa que está teniendo un trabajo de parto prematuro  Es normal tener contracciones de parto prematuro (endurecimiento y relajamiento del útero) conor sin notarlas  Los siguientes son algunos signos y síntomas que pudieran sugerir un trabajo de parto prematuro:  · Contracciones que se hacen más aimee y más seguidas    · Cambios en el flujo vaginal, elle más secreción o secreción que es acuosa o con tyree    · Dolor en la parte baja de la espalda    · Presión en la parte baja del abdomen    · Manchado o sangrado vaginal    ¿Cómo se trata Elvin Beech de parto prematuro? El tratamiento temprano podría retrasar el parto  Es posible que usted necesite alguno de los siguientes:  · El reposo en cama podría recomendarse  Es probable que usted necesite acostarse sobre el lado anna, lo cual mejora la circulación hacia el útero y el bebé  Nair médico le indicará cuando se puede levantar de la cama  · Medicamentos podrían administrase para parar las contracciones si nair bebé no está listo para nacer  Es probable que usted también necesite ciertos medicamentos si nair trabajo de parto prematuro no puede ser detenido y nair médico piensa que usted tendrá a nair bebé antes de Ottoniel  Estos medicamentos ayudan a los pulmones, cerebro y órganos digestivos de nair bebé a madurar  También ayudan a disminuir el riesgo que nair bebé corre de nacer con parálisis cerebral  Si usted tiene RPM, le revisarán el fluido de nair vagina o recto para detectar declan infección por estreptococos  Es posible que le den antibióticos para prevenir declan infección por estreptococos chandler el parto  También se pueden usar antibióticos para evitar que comience el [place] de [place]   También puede necesitar esteroides para disminuir el riesgo de complicaciones debido al Charlene prematuro  Llame al Ailin Bolus de emergencias local (911 en los [place]) si:  · Usted nota o siente elle si tuviera algo en la vagina  ¿Cuándo geovanni llamar a mi médico?  · Usted tiene sangrado vaginal amaya brillante o sin dolor  · Lynsey síntomas no mejoran o más eulalia éstos empeoran  · Usted rompe dayanara o siente un chorro o gotas de agua tibia que le está bajando por qureshi vagina  · Usted tiene contracciones que se hacen más aimee y más seguidas por más de 1 hora  · Usted nota declan disminución en los movimientos de qureshi bebé  · Usted tiene calambres, presión o tensión abdominal     · Usted tiene un cambio en la secreción vaginal     · Tiene fiebre  · Usted tiene ardor al orinar o está orinando menos de lo que es normal para usted  · Usted tiene preguntas o inquietudes acerca de qureshi condición o cuidado  ACUERDOS SOBRE QURESHI CUIDADO:   Usted tiene el derecho de ayudar a planear qureshi cuidado  Aprenda todo lo que pueda sobre qureshi condición y elle darle tratamiento  Discuta lynsey opciones de tratamiento con lynsey médicos para decidir el cuidado que usted desea recibir  Usted siempre tiene el derecho de rechazar el tratamiento  Esta información es sólo para uso en educación  Qureshi intención no es darle un consejo médico sobre enfermedades o tratamientos  Colsulte con qureshi [place] farmacéutico antes de seguir cualquier régimen médico para saber si es seguro y efectivo para usted  © Copyright [employer] 2022 Information is for End User's use only and may not be sold, redistributed or otherwise used for commercial purposes  All illustrations and images included in CareNotes® are the copyrighted property of A D A M , Inc  or [address] [place] [address] patadas en el embarazo   LO QUE NECESITA SABER:   ¿Qué necesito saber acerca del conteo de patadas? El conteo de patadas mide cuánto se está moviendo qureshi bebé en el [place]   Karlyne Portal de qureshi bebé podría sentirse elle declan torcedura, edclan Verlene Peña, un crujido, un meneo o un golpe  Es común sentir a tu bebé patear a las 32 a 29 semanas de [place]  Es posible que sienta al bebé patear ya a las 20 semanas de [place]  Puede que desee empezar a contar a las 28 semanas  ¿Por qué debería realizar el conteo de patadas? Los movimientos de nair bebé podrían proporcionar información de la lanette de nair bebé  Es posible que si hay problemas, nair bebé se mueva menos o nada en lo absoluto  El bebé podría moverse menos si no recibe suficiente oxígeno o alimento de la placenta  No fume mientras está embarazada  Fumar disminuye la cantidad de oxígeno que llega a nair bebé  Hable con nair médico si necesita ayuda para dejar de fumar  Los problemas que se encuentran en declan etapa más temprana son más fáciles de tratar  ¿Cuándo se realiza un conteo de patadas? · Cuente las patadas en el mismo horario todos los arvind  · Realice el conteo de las patadas cuando nair bebé esté despierto y [place]  Nair bebé podría estar más activo en la tarde  ¿Cómo se realiza un conteo de patadas? Revise que nair bebé esté despierto antes de realizar el conteo de patadas  Usted puede despertar a nair bebé empujando nair estómago suavemente, caminando o tomando algo frío  Nair médico podría indicarle diferentes maneras de realizar el conteo  Es posible que le indique que moncho lo siguiente:  · Use declan gráfica o un reloj para mantener un registro de la hora en que comienza y termina de contar  · Siéntese en declan silla o acuéstese en nair costado derecho  · Coloque lynsey kristina en la parte más iker de nair BJURHOLM  · Cuente hasta que llegue a las 10 patadas  Escriba cuánto tiempo le lleva contar las 10 patadas  · Podría ewa de 30 minutos a 2 horas para contar 10 patadas  No debería de ewa más de 2 horas para contar 10 patadas  ¿Cuándo geovanni comunicarme con mi médico?  · Usted siente un cambio en el número de patadas o movimientos de nair bebé  · Siente menos de 10 patadas en 2 horas      · Usted tiene preguntas o inquietudes acerca de los movimientos de nair bebé  ACUERDOS SOBRE NAIR CUIDADO:   Usted tiene el derecho de ayudar a planear nair cuidado  Aprenda todo lo que pueda sobre nair condición y elle darle tratamiento  Discuta lynsey opciones de tratamiento con lynsey médicos para decidir el cuidado que usted desea recibir  Usted siempre tiene el derecho de rechazar el tratamiento  Esta información es sólo para uso en educación  Nair intención no es darle un consejo médico sobre enfermedades o tratamientos  Colsulte con nair [place] farmacéutico antes de seguir cualquier régimen médico para saber si es seguro y efectivo para usted  © Copyright [facility] 2022 Information is for End User's use only and may not be sold, redistributed or otherwise used for commercial purposes   All illustrations and images included in CareNotes® are the copyrighted property of A D A M , Inc  or [address] [place] [address]

## 2022-03-08 ENCOUNTER — ROUTINE PRENATAL (OUTPATIENT)
Dept: OBGYN CLINIC | Facility: CLINIC | Age: 32
End: 2022-03-08

## 2022-03-08 VITALS
HEART RATE: 98 BPM | SYSTOLIC BLOOD PRESSURE: 130 MMHG | DIASTOLIC BLOOD PRESSURE: 82 MMHG | BODY MASS INDEX: 27.31 KG/M2 | WEIGHT: 160 LBS | HEIGHT: 64 IN

## 2022-03-08 DIAGNOSIS — O09.299 HISTORY OF PRE-ECLAMPSIA IN PRIOR PREGNANCY, CURRENTLY PREGNANT: ICD-10-CM

## 2022-03-08 DIAGNOSIS — R30.0 DYSURIA DURING PREGNANCY IN THIRD TRIMESTER: ICD-10-CM

## 2022-03-08 DIAGNOSIS — O26.893 ABDOMINAL PAIN DURING PREGNANCY IN THIRD TRIMESTER: ICD-10-CM

## 2022-03-08 DIAGNOSIS — Z34.93 PRENATAL CARE IN THIRD TRIMESTER: ICD-10-CM

## 2022-03-08 DIAGNOSIS — Z87.51 HISTORY OF PRETERM DELIVERY: ICD-10-CM

## 2022-03-08 DIAGNOSIS — O26.893 DYSURIA DURING PREGNANCY IN THIRD TRIMESTER: ICD-10-CM

## 2022-03-08 DIAGNOSIS — R10.9 ABDOMINAL PAIN DURING PREGNANCY IN THIRD TRIMESTER: ICD-10-CM

## 2022-03-08 DIAGNOSIS — K59.00 CONSTIPATION DURING PREGNANCY IN THIRD TRIMESTER: ICD-10-CM

## 2022-03-08 DIAGNOSIS — Z3A.34 34 WEEKS GESTATION OF PREGNANCY: ICD-10-CM

## 2022-03-08 DIAGNOSIS — Z11.4 SCREENING FOR HIV (HUMAN IMMUNODEFICIENCY VIRUS): ICD-10-CM

## 2022-03-08 DIAGNOSIS — O99.613 CONSTIPATION DURING PREGNANCY IN THIRD TRIMESTER: ICD-10-CM

## 2022-03-08 DIAGNOSIS — Z34.93 PRENATAL CARE IN THIRD TRIMESTER: Primary | ICD-10-CM

## 2022-03-08 LAB
SL AMB  POCT GLUCOSE, UA: ABNORMAL
SL AMB LEUKOCYTE ESTERASE,UA: ABNORMAL
SL AMB POCT BILIRUBIN,UA: ABNORMAL
SL AMB POCT BLOOD,UA: ABNORMAL
SL AMB POCT CLARITY,UA: ABNORMAL
SL AMB POCT COLOR,UA: YELLOW
SL AMB POCT KETONES,UA: ABNORMAL
SL AMB POCT NITRITE,UA: ABNORMAL
SL AMB POCT PH,UA: 6.5
SL AMB POCT SPECIFIC GRAVITY,UA: 1.02
SL AMB POCT URINE PROTEIN: ABNORMAL
SL AMB POCT UROBILINOGEN: ABNORMAL

## 2022-03-08 PROCEDURE — 87491 CHLMYD TRACH DNA AMP PROBE: CPT | Performed by: FAMILY MEDICINE

## 2022-03-08 PROCEDURE — 87591 N.GONORRHOEAE DNA AMP PROB: CPT | Performed by: FAMILY MEDICINE

## 2022-03-08 PROCEDURE — 81002 URINALYSIS NONAUTO W/O SCOPE: CPT | Performed by: FAMILY MEDICINE

## 2022-03-08 PROCEDURE — 99213 OFFICE O/P EST LOW 20 MIN: CPT | Performed by: FAMILY MEDICINE

## 2022-03-08 RX ORDER — DOCUSATE SODIUM 100 MG/1
100 CAPSULE, LIQUID FILLED ORAL 2 TIMES DAILY
Qty: 30 CAPSULE | Refills: 3 | Status: SHIPPED | OUTPATIENT
Start: 2022-03-08

## 2022-03-08 RX ORDER — ADHESIVE BANDAGE 3/4"
BANDAGE TOPICAL DAILY
Qty: 1 EACH | Refills: 0 | Status: SHIPPED | OUTPATIENT
Start: 2022-03-08 | End: 2022-03-10

## 2022-03-08 RX ORDER — PNV NO.95/FERROUS FUM/FOLIC AC 28MG-0.8MG
1 TABLET ORAL DAILY
Qty: 30 TABLET | Refills: 3 | Status: SHIPPED | OUTPATIENT
Start: 2022-03-08 | End: 2022-05-19

## 2022-03-08 RX ORDER — ACETAMINOPHEN 325 MG/1
650 TABLET ORAL EVERY 6 HOURS PRN
Qty: 60 TABLET | Refills: 0 | Status: SHIPPED | OUTPATIENT
Start: 2022-03-08

## 2022-03-08 RX ORDER — ADHESIVE BANDAGE 3/4"
BANDAGE TOPICAL DAILY
Qty: 1 EACH | Refills: 0 | Status: SHIPPED | OUTPATIENT
Start: 2022-03-08 | End: 2022-03-08

## 2022-03-08 NOTE — PATIENT INSTRUCTIONS
El embarazo de la semana 35 a la 38   LO QUE NECESITA SABER:   Al principio de las 37 semanas se considera que usted está en término completo  Podría ser más fácil que usted respire si nair bebé se ha posicionado con la heriberto hacia abajo  Es posible que usted necesite orinar con mayor frecuencia ya que el bebé podría estar presionando nair vejiga  Usted también podría sentir más molestias y cansarse fácilmente  INSTRUCCIONES SOBRE EL JESSICA HOSPITALARIA:   Regrese a la laury de emergencias si:  · Usted presenta un ulices dolor de heriberto que no desaparece  · Usted tiene cambios en la visión nuevos o en aumento, elle visión borrosa o con manchas  · Usted tiene inflamación nueva o creciente en nair giovana o kristina  · Usted tiene manchado o sangrado vaginal     · Usted rompe dayanara o siente un chorro o gotas de agua tibia que le está bajando por nair vagina  Llame a nair obstetra si:  · Usted tiene más de 5 contracciones en 1 hora  · Usted nota algún cambio en los movimientos de nair bebé  · Usted tiene calambres, presión o tensión abdominal     · Usted tiene un cambio en la secreción vaginal     · Usted tiene escalofríos o fiebre  · Usted tiene comezón, ardor o dolor vaginal     · Usted tiene declan secreción vaginal amarillenta, verdosa, sushma o de [employer]  · Usted tiene dolor o ardor al Catherine Found, orina menos de lo habitual o tiene [place] rosada o sanguinolenta  · Usted tiene preguntas o inquietudes acerca de nair condición o cuidado  Cómo cuidarse en esta etapa de nair embarazo:      · Consuma alimentos saludables y variados  Alimentos saludables incluyen frutas, verduras, panes de feroz integral, alimentos lácteos bajos en grasa, frijoles, portia magras y pescado  Micro líquidos elle se le haya indicado  Pregunte cuánto líquido debe ewa cada día y cuáles líquidos son los más adecuados para usted  Limite el consumo de cafeína a menos de [address]   Limite el consumo de pescado a 2 porciones cada semana  Escoja pescado con concentraciones bajas de arabella elle atún al natural enlatado, camarón, salmón, bacalao o tilapia  No coma pescado con concentraciones altas de arabella elle pez madi, caballa gigante, pargo rayado y tiburón  · [address] [address]  Nair necesidad de ciertas vitaminas y [address], elle el ácido fólico, aumenta chandler el [facility]  Las vitaminas prenatales proporcionan algunas de las vitaminas y minerales adicionales que usted necesita  Las vitaminas prenatales también podrían ayudar a disminuir el riesgo de ciertos defectos de nacimiento  · Descanse tanto elle sea necesario  Levante lynsey pies si tiene inflamación en lynsey tobillos y pies  · Consulte con nair médico acerca de hacer ejercicio  El ejercicio moderado puede ayudarla a mantenerse en forma  Nair médico la ayudará a planear un programa de ejercicios que sea seguro para usted chandler nair [facility]  · No fume  Fumar aumenta el riesgo de aborto espontáneo y otros problemas de lanette chandler nair [facility]  Fumar puede causar que nair bebé nazca antes de tiempo o que pese menos al nacer  Solicite información a nair médico si usted necesita ayuda para dejar de fumar  · No consuma alcohol  El alcohol pasa de nair cuerpo al bebé a través de la placenta  Puede afectar el desarrollo del cerebro de nair bebé y provocar el síndrome de alcoholismo fetal (SAF)  El SAF es un veronica de condiciones que causan problemas [place], de comportamiento y de crecimiento  · Consulte con nair médico antes de ewa cualquier medicamento  Muchos medicamentos pueden perjudicar a nair bebé si usted los ga [address]  No tome ningún medicamento, vitaminas, hierbas o suplementos sin daniela consultar con nair Yosef Crick  use drogas ilegales o de la molina (elle marihuana o cocaína) mientras está embarazada  Consejos de seguridad:  · Evite jacuzzis y saunas   No use un jacuzzi o un sauna mientras usted está embarazada, especialmente chandler el primer trimestre  Los [facility] y los saunas aumentan la temperatura de nair bebé y el riesgo de defectos de nacimiento  · Evite la toxoplasmosis  [place] es declan infección causada por comer carne cruda o estar cerca del excremento de un cachorro infectado  [place] puede causar malformaciones congénitas, aborto espontáneo y Robin Schein  Lávese las kristina después de tocar carne cruda  Asegúrese de que la carne esté eulalia cocida antes de comerla  Evite los huevos crudos y la [place]  Use guantes o pida que alguien la ayude a limpiar la caja de arena del cachorro mientras usted [place]   · Consulte con nair médico acerca de viajar  El [address] cómodo para viajar es chandler el oli trimestre  Pregunte a nair médico si puede viajar después de las 36 semanas  Es posible que no pueda viajar en avión después de las 36 [facility]  También le puede recomendar que evite los viajes largos por carretera  Cambios que ocurren con nair bebé: Para las 38 semanas, nair bebé podría pesar entre 6 y 5 libras  Nair bebé podría medir alrededor de 14 pulgadas de wendy desde la punta de la heriberto hasta la rabadilla (parte inferior del bebé)  Nair bebé escucha lo suficientemente eulalia elle para reconocer nair voz  Conforme nair bebé crece más, usted podría sentir menos patadas y sentir más que nair bebé se estira y [place]  Nair bebé podría moverse en posición con la heriberto hacia abajo  Nair bebé también descansará en la parte inferior de nair abdomen  Lo que necesita saber acerca del cuidado prenatal: Nair médico le revisará nair presión arterial y [place]  Es posible que también necesite lo siguiente:  · Un examen de orina también podría realizarse para revisarle el azúcar y la proteína  Estas son señales de diabetes gestacional o de infección  La proteína en nair orina también podría ser declan señal de preeclampsia   La preeclampsia es declan condición que puede desarrollarse chandler la semana 20 o después en nair embarazo  Esta provoca presión arterial xochitl y [employer] con lynsey riñones y [place]  · La detección de diabetes gestacional podría realizarse  Nair médico le puede ordenar la curva de tolerancia a la glucosa (OGTT) de 1 paso o de 2 pasos  ? Examen de tolerancia a la glucosa en 1 paso: A usted le revisarán el nivel de azúcar en la tyree después de que no haya comido por 8 horas (en ayunas)  Luego le darán a ewa declan solución de glucosa  Le examinarán el nivel de glucosa nuevamente 1 hora y 2 horas después de terminar la bebida  ? Examen de tolerancia a la glucosa en 2 paso: Usted no tiene que ayunar para la primera parte del examen  Usted se tomará la bebida de glucosa a cualquier hora del día  A usted le revisarán el nivel de azúcar en la tyree al cabo de 1 hora  En michelle que el nivel de azúcar esté más alto que cierto nivel, le ordenarán otro examen  Usted tendrá que ayunar para que le revisen el azúcar en nair tyree  Usted se tomará la bebida de glucosa  Le examinarán la tyree de nuevo 1 [place], 2 horas y 3 horas después de terminarse la bebida de glucosa  · Los análisis de tyree se pueden realizar para revisar si tiene signos de anemia (nivel bajo del buck)  · La vacuna Tdap podría ser recomendado por nair médico     · El examen del estreptococo del veronica B es un examen que se realiza para verificar si hay infección por estreptococos del veronica B  El estreptococo del veronica B es un tipo de bacteria que se puede encontrar en la vagina o el recto  Podría ser transmitida a nair bebé chandler el parto si usted la tiene  Nair médico podría ewa Josie Knee de nair vagina o recto y kim la [place] al laboratorio para que la examinen  · La altura uterina es declan medición del útero para controlar el desarrollo de nair bebé  [employer] por lo general es igual al número de [facility] que usted tiene de embarazo  Nair médico también podría revisar la posición de nair bebé      · El ritmo cardíaco de nair bebé será revisado  Programe declan lorena con nair obstétrico elle se le indique: Anote lynsey preguntas para que se acuerde de hacerlas chandler lynsey visitas  © Copyright [employer] 2022 Information is for End User's use only and may not be sold, redistributed or otherwise used for commercial purposes  All illustrations and images included in CareNotes® are the copyrighted property of A D A M , [address] es sólo para uso en educación  Nair intención no es darle un consejo médico sobre enfermedades o tratamientos  Colsulte con nair [place] farmacéutico antes de seguir cualquier régimen médico para saber si es seguro y efectivo para usted  Preeclampsia chandler el embarazo   LO QUE NECESITA SABER:   La preeclampsia es la presión arterial jessica que generalmente se desarrolla después de la semana 21 de embarazo  También puede producirse días o semanas después del parto  Nair presión arterial puede ser de 140/90 o mayor  [place] o ambos números pueden ser [employer]  Usted también puede tener proteínas en la orina o daño a [employer] riñones o el hígado  La hipertensión crónica con preeclampsia superpuesta es la preeclampsia en declan eugenia con antecedentes de hipertensión antes del embarazo  También puede ser preeclampsia que se desarrolla antes de la semana 20 de embarazo  La preeclampsia puede llevar a afecciones potencialmente mortales elle accidente cerebrovascular, eclampsia (convulsiones) o síndrome HELLP (destrucción de células sanguíneas)  Es importante hacerse pruebas para detectar la presión arterial jessica chandler el embarazo  La presión arterial jessica no siempre causa síntomas  Los síntomas que se desarrollan pueden ser generales, elle concha de heriberto e hinchazón que usted puede pensar que no son graves         INSTRUCCIONES SOBRE EL JESSICA HOSPITALARIA:   Llame al número de emergencias local (911 en los [place]) si:  · Usted sufre declan convulsión  · Usted tiene dolor en el pecho  Regrese a la laury de emergencias si:  · Tiene dolor abdominal intenso con o sin náuseas y vómitos  · Tiene dolor de heriberto intenso que no se miguel con medicamentos  · Usted tiene visión borrosa o con manchas que no mejora  · Usted está sangrando por la vagina  Llame a nair médico u obstetra si:  · Usted tiene inflamación nueva o creciente en nair giovana o kristina  · Usted no orina u [place]     · Usted no siente los movimientos de nair bebé con tanta frecuencia elle de costumbre  · Usted tiene preguntas o inquietudes acerca de nair condición o cuidado  Medicamentos: Es posible que usted necesite alguno de los siguientes:  · Medicamentos para la presión arterial ayuda a disminuir la presión en la tyree y protege nair corazón, pulmones, cerebro y riñones  [place] nair medicamento para la presión exactamente elle se lo indiquen  · Medicamentos esteroideos ayudan al desarrollo de los pulmones del bebé  Estos se pueden administrar si tiene que radha a rich antes de las 37 semanas de [place]  · Dosis bajas de aspirina se puede recomendar después de las 12 semanas de embarazo si usted tiene un riesgo alto de preeclampsia  La aspirina puede ayudar a evitar la preeclampsia o los problemas que se pueden presentar debido a la preeclampsia  No tome aspirina a menos que nair médico se lo indique  · [place] lynsey medicamentos elle se le haya indicado  Consulte con nair médico si usted neida que nair medicamento no le está ayudando o si presenta efectos secundarios  Infórmele si es alérgico a algún medicamento  Mantenga declan lista actualizada de los [place], las vitaminas y los productos herbales que ga  Incluya los siguientes datos de los medicamentos: cantidad, frecuencia y motivo de administración  Traiga con usted la lista o los envases de las píldoras a lynsey citas de seguimiento  Lleve la lista de los medicamentos con usted en michelle de declan emergencia      Control de la preeclampsia: Se tendrá que revisar nair presión arterial de 1 a 2 veces semanalmente hasta que nazca nair bebé  Las siguientes son formas en las que puede ayudar a controlar la hipertensión arterial chandler el embarazo:  · Repose según le indicaron  Nair médico podría indicarle que descanse más a menudo si tiene síntomas leves de preeclampsia  Puede que necesite estar en el hospital si nair condición ASHLEE  · No consuma alcohol ni fume  El alcohol, la nicotina y otros químicos en los cigarrillos y cigarros pueden aumentar la presión arterial  También pueden dañar al bebé  Pida información a nair médico si usted actualmente ga alcohol o fuma y [place] para dejar de hacerlo  Los cigarrillos electrónicos o el tabaco sin humo igualmente contienen nicotina  Consulte con nair médico antes de [employer]  · Ronnie el conteo de las patadas elel le hayan indicado  Es posible que necesite llevar un registro de la frecuencia con que nair bebé se mueve o patea chandler un cierto lapso de Ottoniel  Pregúntele a nair obstétrico cómo contar las patadas y con qué frecuencia debe hacerlo  · Revise nair peso todos los GRASSE  Pésese diariamente antes de desayunar  El aumento de peso puede ser declan señal de exceso de líquido en el cuerpo  Llame a nair obstétrico si usted ha [place] de 2 libras en [facility]  Programe declan lorena con nair obstétrico elle se le indique: Usted necesitará que le ordenen exámenes 1 a 2 veces a la semana para revisar nair condición  Estos exámenes incluyen revisarle nair presión arterial, exámenes de Philipplucian y Div[facility] MoralesC[facility]ola General Hospitaltony y observación del feto  Anote lynsey preguntas para que se acuerde de hacerlas chandler lynsey visitas  © [employer] 2022 Information is for End User's use only and may not be sold, redistributed or otherwise used for commercial purposes   All illustrations and images included in CareNotes® are the copyrighted property of Kaye MILLER  or [address] [place] [address] es sólo para uso en educación  Nair intención no es darle un consejo médico sobre enfermedades o tratamientos  Colsulte con nair Classie Crawford farmacéutico antes de seguir cualquier régimen médico para saber si es seguro y efectivo para usted

## 2022-03-08 NOTE — PROGRESS NOTES
OB/GYN  PN Visit  Madelaine Gay  [id number]  3/8/2022  3:40 PM  Dr Juan Miguel Sue, DO    S: 27 y o  [id number] 34w6d here for PN visit  OB complaints:  Ctxns: yes, see below  Leakage: none  Bleeding: none  Fetal movement: yes    She was recently seen in ED as well as L&D on  for abdominal pain, workup and ultrasound at the time were remarkable only for BV which was treated  She was then seen again by L&D on 3/5 for contractions and pain as well as dizziness  SVE at that time C/T/H, toco with uterine silence, soft abdomen, no pain on pressure of previous surgical incision and NST was reactive with moderate variability, accels without decels  Today she reports ongoing pain/contractions  She describes this as though the baby is punching her belly and kicking a lot  She recalls similar pain in past pregnancies  She has a history of pre-eclampsia in pregnancy with two previous c-sections  She reports her first pregnancy resulted in  due to bleeding and  labor around 35 weeks and this occurred in the DR  Her second pregnancy was a repeat  at 38 weeks  She does not check her BP at home and does not have a cuff  She is taking Asprin 162mg daily  She is also taking iron twice a day for anemia but reports constipation  She also reports some occasional dysuria, and recent UA was generally unremarkable  O:  Vitals:    22 1355   BP: 130/82   Pulse: 98       Gen: no acute distress, nonlabored breathing  Cardio: RRR no MRG  Pulm: CTAB  Abd: gravid urterus, soft, non-tender, active fetal movement palpated  OB exam completed:   fundal height: 34 5cm HR: 136    A/P:    Neema was seen today for routine prenatal visit      Diagnoses and all orders for this visit:    Prenatal care in third trimester  -     Chlamydia/GC amplified DNA by PCR  -     HIV 1/2 ANTIGEN/ANTIBODY (4TH GENERATION) [facility]; Future  -     Prenatal Vit-Fe Fumarate-FA (prenatal vitamin) 28-0 8 mg; Take 1 tablet by mouth daily    34 weeks gestation of pregnancy  -     Chlamydia/GC amplified DNA by PCR  -     HIV 1/2 ANTIGEN/ANTIBODY (4TH GENERATION) W REFLEX [facility]; Future    History of pre-eclampsia in prior pregnancy, currently pregnant  -     Discontinue: Blood Pressure Monitoring (Blood Pressure Cuff) MISC; Use in the morning  -     Blood Pressure Monitoring (Blood Pressure Cuff) MISC; Use in the morning    History of  delivery    Screening for HIV (human immunodeficiency virus)  -     HIV 1/2 ANTIGEN/ANTIBODY (4TH GENERATION) W REFLEX [facility]; Future    Prenatal care in third trimester  Comments:     Orders:  -     Chlamydia/GC amplified DNA by PCR  -     HIV 1/2 ANTIGEN/ANTIBODY (4TH GENERATION) W REFLEX [facility]; Future  -     Prenatal Vit-Fe Fumarate-FA (prenatal vitamin) 28-0 8 mg; Take 1 tablet by mouth daily    Abdominal pain during pregnancy in third trimester  -     acetaminophen (TYLENOL) 325 mg tablet; Take 2 tablets (650 mg total) by mouth every 6 (six) hours as needed for mild pain or moderate pain    Dysuria during pregnancy in third trimester  -     POCT urine dip    Constipation during pregnancy in third trimester  -     docusate sodium (COLACE) 100 mg capsule; Take 1 capsule (100 mg total) by mouth 2 (two) times a day        #1  34w6d GESTATION  Labor precautions reviewed  Fetal kick counts reviewed  Pre-eclampsia warning signs reviewed and BP cuff was ordered for the patient  I recommended she check her BP if symptomatic and to call if >/= 140/90  Will send refills of prenatal, colace, and tylenol  Repeat urine remarkable only for ketones today, recommended improved hydration   Reassured patient of pain not presenting as contraction pain and encouraged to take tylenol as needed for the pain     GC/Chlamydia not obtained yet in this pregnancy, will send urine today  Continue asa for preE ppx  Continue iron for anemia  Will need GBS at next visit  HIV not yet obtained this pregnancy, ordered  Encouraged patient to obtain 3HGTT as 1HrGTT was elevated  RTC in 2 weeks      Future Appointments   Date Time Provider Tico Summers   3/22/2022  2:30 PM [address] [address] [address] [address]       pregnancy Problems (from 22 to present)     Problem Noted Resolved    Contraception management 2022 by Vaishali Wills MD No    Overview Signed 2022  8:21 AM by Vaishali Wills MD     Desires tubal ligation  MA-31 signed 22         32 weeks gestation of pregnancy 2021 by Shea Mahmood MD No    Pregnancy with history of  section, antepartum 2021 by Vaishali Wills MD No    Overview Signed 2021 10:43 AM by Vaishali Wills MD     Two prior  sections         Hx of preeclampsia, prior pregnancy, currently pregnant, third trimester 2021 by Vaishali Wills MD No    Overview Addendum 10/26/2021  8:26 AM by Vaishali Wills MD     Needs to complete prenatal panel and baseline preeclamptic labs  Started on 162 mg aspirin daily         Previous Version        Do Calhoun DO  3/8/2022  3:40 PM

## 2022-03-10 ENCOUNTER — TELEPHONE (OUTPATIENT)
Dept: OBGYN CLINIC | Facility: CLINIC | Age: 32
End: 2022-03-10

## 2022-03-10 LAB
C TRACH DNA SPEC QL NAA+PROBE: NEGATIVE
N GONORRHOEA DNA SPEC QL NAA+PROBE: NEGATIVE

## 2022-03-10 RX ORDER — ADHESIVE BANDAGE 3/4"
BANDAGE TOPICAL DAILY
Qty: 1 EACH | Refills: 0 | Status: ON HOLD | OUTPATIENT
Start: 2022-03-10 | End: 2022-04-07 | Stop reason: ALTCHOICE

## 2022-03-10 NOTE — TELEPHONE ENCOUNTER
Good Morning,     [facility] called back stating the prescription that was received  She advised you to send it to a different pharamcy so the pt can get a digital blood pressure machine  The pharmacy she recommend was [address] [facility] the [facility]  Pt has [facility] has her primary insurance  If sent I can call pt to   Thank you!

## 2022-03-11 ENCOUNTER — TELEPHONE (OUTPATIENT)
Dept: OBGYN CLINIC | Facility: CLINIC | Age: 32
End: 2022-03-11

## 2022-03-11 NOTE — TELEPHONE ENCOUNTER
L/m to pt to  blood pressure machine in [employer]  If pt needs the direction or number please call back office

## 2022-03-21 NOTE — PROGRESS NOTES
Kadeem Kim is a 27 y o  [id number] who presents today for routine OB visit at 36w5d  Patient and spouse have no questions at this time  Visit Vitals  /71 (BP Location: Left arm, Patient Position: Sitting, Cuff Size: Adult)   Pulse 101   Ht 5' 4" (1 626 m)   Wt 73 9 kg (163 lb)   LMP 2021 (Approximate)   Breastfeeding No   BMI 27 98 kg/m²   OB Status Pregnant   Smoking Status Never Smoker   BSA 1 79 m²         Abdomen: gravid, soft, non-tender  Fundal height 37cm,       She reports swelling of feet and hands with pain over last month  Reoprts uterine contractions  3X daily lasting seconds  Denies vaginal bleeding, reports leaking of fluid small white in color, watery  Reports headache yesterday lasting ~ 2 hrs, states is still taking ASA and prenatal supplement  Urine dip today was negative for protein  Patient was advised to continue to monitor hr Sx closely and report any worsening immediately  Reports adequate fetal movement of at least 10 movements in 2 hours once daily  Plan:   Prenatal Complications: contractions with ED evaluations in February   Complications: none  Blood Type: A   RH status:   Birth Plan: C Section  scheduled    Third trimester bloodwork yes  Vaccinations: up to date  GBS status: taken today  Scheduled for next ultrasound unkown  Reviewed premature labor precautions and fetal kick counts  Advised to continue medications and return in 1 weeks  Current Outpatient Medications:     acetaminophen (TYLENOL) 325 mg tablet, Take 2 tablets (650 mg total) by mouth every 6 (six) hours as needed for mild pain or moderate pain, Disp: 60 tablet, Rfl: 0    aspirin (ECOTRIN LOW STRENGTH) 81 mg EC tablet, Take 2 tablets (162 mg total) by mouth daily Stop at 36 weeks  , Disp: 180 tablet, Rfl: 0    Blood Pressure Monitoring (Blood Pressure Cuff) MISC, Use in the morning, Disp: 1 each, Rfl: 0    docusate sodium (COLACE) 100 mg capsule, Take 1 capsule (100 mg total) by mouth 2 (two) times a day, Disp: 30 capsule, Rfl: 3    ferrous sulfate 324 (65 Fe) mg, Take 1 tablet (324 mg total) by mouth 2 (two) times a day before meals, Disp: 30 tablet, Rfl: 6    folic acid (FOLVITE) 010 mcg tablet, Take 1 tablet (400 mcg total) by mouth daily, Disp: 30 tablet, Rfl: 3    Prenatal Vit-Fe Fumarate-FA (prenatal vitamin) 28-0 8 mg, Take 1 tablet by mouth daily, Disp: 30 tablet, Rfl: 3      pregnancy Problems (from 22 to present)     Problem Noted Resolved    Contraception management 2022 by Jordana Nova MD No    Overview Signed 2022  8:21 AM by Jordana Nova MD     Desires tubal ligation  MA-31 signed 22         32 weeks gestation of pregnancy 2021 by Desmond De Leon MD No    Pregnancy with history of  section, antepartum 2021 by Jordana Nova MD No    Overview Signed 2021 10:43 AM by Jordana Nova MD     Two prior  sections         Hx of preeclampsia, prior pregnancy, currently pregnant, third trimester 2021 by Jordana Nova MD No    Overview Addendum 10/26/2021  8:26 AM by Jordana Nova MD     Needs to complete prenatal panel and baseline preeclamptic labs  Started on 162 mg aspirin daily         Previous Version

## 2022-03-22 ENCOUNTER — ROUTINE PRENATAL (OUTPATIENT)
Dept: OBGYN CLINIC | Facility: CLINIC | Age: 32
End: 2022-03-22

## 2022-03-22 VITALS
SYSTOLIC BLOOD PRESSURE: 117 MMHG | BODY MASS INDEX: 27.83 KG/M2 | WEIGHT: 163 LBS | DIASTOLIC BLOOD PRESSURE: 71 MMHG | HEIGHT: 64 IN | HEART RATE: 101 BPM

## 2022-03-22 DIAGNOSIS — Z34.93 PRENATAL CARE IN THIRD TRIMESTER: Primary | ICD-10-CM

## 2022-03-22 DIAGNOSIS — O09.293 HX OF PREECLAMPSIA, PRIOR PREGNANCY, CURRENTLY PREGNANT, THIRD TRIMESTER: ICD-10-CM

## 2022-03-22 LAB
SL AMB  POCT GLUCOSE, UA: NORMAL
SL AMB LEUKOCYTE ESTERASE,UA: NORMAL
SL AMB POCT BILIRUBIN,UA: NORMAL
SL AMB POCT BLOOD,UA: NORMAL
SL AMB POCT CLARITY,UA: CLEAR
SL AMB POCT COLOR,UA: YELLOW
SL AMB POCT KETONES,UA: NORMAL
SL AMB POCT NITRITE,UA: NORMAL
SL AMB POCT PH,UA: 7
SL AMB POCT SPECIFIC GRAVITY,UA: 1.02
SL AMB POCT URINE PROTEIN: NORMAL
SL AMB POCT UROBILINOGEN: NORMAL

## 2022-03-22 PROCEDURE — 87150 DNA/RNA AMPLIFIED PROBE: CPT | Performed by: CHIROPRACTOR

## 2022-03-24 LAB — GP B STREP DNA SPEC QL NAA+PROBE: NEGATIVE

## 2022-03-29 ENCOUNTER — ROUTINE PRENATAL (OUTPATIENT)
Dept: OBGYN CLINIC | Facility: CLINIC | Age: 32
End: 2022-03-29

## 2022-03-29 VITALS
DIASTOLIC BLOOD PRESSURE: 82 MMHG | HEART RATE: 93 BPM | SYSTOLIC BLOOD PRESSURE: 123 MMHG | HEIGHT: 64 IN | BODY MASS INDEX: 27.83 KG/M2 | WEIGHT: 163 LBS

## 2022-03-29 DIAGNOSIS — Z30.8 ENCOUNTER FOR OTHER CONTRACEPTIVE MANAGEMENT: ICD-10-CM

## 2022-03-29 DIAGNOSIS — Z34.83 ENCOUNTER FOR SUPERVISION OF OTHER NORMAL PREGNANCY IN THIRD TRIMESTER: Primary | ICD-10-CM

## 2022-03-29 DIAGNOSIS — Z3A.37 37 WEEKS GESTATION OF PREGNANCY: ICD-10-CM

## 2022-03-29 DIAGNOSIS — O09.293 HX OF PREECLAMPSIA, PRIOR PREGNANCY, CURRENTLY PREGNANT, THIRD TRIMESTER: ICD-10-CM

## 2022-03-29 DIAGNOSIS — O34.219 PREGNANCY WITH HISTORY OF CESAREAN SECTION, ANTEPARTUM: ICD-10-CM

## 2022-03-29 PROCEDURE — 99213 OFFICE O/P EST LOW 20 MIN: CPT | Performed by: OBSTETRICS & GYNECOLOGY

## 2022-03-29 NOTE — PROGRESS NOTES
Assessment & Plan  27 y o  [id number] at 37w6d presenting for routine prenatal visit  RLTCS and BTL scheduled for 22 at 10:00 AM    Problem List        Other    Pregnancy with history of  section, antepartum    Overview     Two prior  sections         Hx of preeclampsia, prior pregnancy, currently pregnant, third trimester    Overview     Needs to complete prenatal panel and baseline preeclamptic labs  Started on 162 mg aspirin daily         History of  delivery    Overview     At about 30 weeks, secondary to vaginal bleeding per patient  Per patient, she went into  labor         H/O LEEP    Overview     In the [facility], for CIN1         37 weeks gestation of pregnancy    Contraception management    Overview     Desires tubal ligation  MA-Fawad signed 22           Other Visit Diagnoses     Encounter for supervision of other normal pregnancy in third trimester    -  Primary          ____________________________________________________________  Subjective  She reports occasional contractions  She denies loss of fluid or vaginal bleeding  She feels regular fetal movements  Objective  /82 (BP Location: Left arm, Patient Position: Sitting, Cuff Size: Adult)   Pulse 93   Ht 5' 4" (1 626 m)   Wt 73 9 kg (163 lb)   LMP 2021 (Approximate)   BMI 27 98 kg/m²   FHR: 140     Physical Exam  Constitutional:       Appearance: She is well-developed  Genitourinary:      Genitourinary Comments: SVE closed/thick/high      No vaginal discharge  HENT:      Head: Normocephalic and atraumatic  Cardiovascular:      Rate and Rhythm: Normal rate  Pulmonary:      Effort: Pulmonary effort is normal  No respiratory distress  Abdominal:      Palpations: Abdomen is soft  Tenderness: There is no abdominal tenderness  Musculoskeletal:         General: Normal range of motion  Neurological:      Mental Status: She is alert and oriented to person, place, and time  Skin:     General: Skin is warm and dry  Psychiatric:         Behavior: Behavior normal          Thought Content:  Thought content normal           Patient's Active Problem List  Patient Active Problem List   Diagnosis    Pregnancy with history of  section, antepartum    Hx of preeclampsia, prior pregnancy, currently pregnant, third trimester    History of  delivery    H/O LEEP    37 weeks gestation of pregnancy    Contraception management           Deion Gross MD  OB/GYN PGY-3  3/29/2022  9:14 AM

## 2022-03-30 ENCOUNTER — TELEPHONE (OUTPATIENT)
Dept: OBGYN CLINIC | Facility: CLINIC | Age: 32
End: 2022-03-30

## 2022-03-30 ENCOUNTER — HOSPITAL ENCOUNTER (OUTPATIENT)
Facility: HOSPITAL | Age: 32
Discharge: HOME | End: 2022-03-30
Attending: OBSTETRICS & GYNECOLOGY | Admitting: OBSTETRICS & GYNECOLOGY
Payer: COMMERCIAL

## 2022-03-30 VITALS
OXYGEN SATURATION: 99 % | DIASTOLIC BLOOD PRESSURE: 80 MMHG | TEMPERATURE: 98.2 F | RESPIRATION RATE: 18 BRPM | HEART RATE: 95 BPM | SYSTOLIC BLOOD PRESSURE: 121 MMHG

## 2022-03-30 DIAGNOSIS — M62.838 MUSCLE SPASM: Primary | ICD-10-CM

## 2022-03-30 PROCEDURE — 99213 OFFICE O/P EST LOW 20 MIN: CPT

## 2022-03-30 RX ORDER — CYCLOBENZAPRINE HCL 10 MG
10 TABLET ORAL 3 TIMES DAILY PRN
Status: DISCONTINUED | OUTPATIENT
Start: 2022-03-30 | End: 2022-03-30 | Stop reason: HOSPADM

## 2022-03-30 RX ORDER — CYCLOBENZAPRINE HCL 10 MG
10 TABLET ORAL 3 TIMES DAILY PRN
Qty: 9 TABLET | Refills: 0 | Status: SHIPPED | OUTPATIENT
Start: 2022-03-30 | End: 2022-04-02

## 2022-03-30 NOTE — TELEPHONE ENCOUNTER
Pt called stating she felt a tingly feeling in her mouth with some trouble breathing  Pt was stated to go to there ER in [place]  PT stated she felt better but she was still recommended to get checked in the ER

## 2022-04-04 NOTE — PROGRESS NOTES
Santhosh Gilbert presents today for routine OB visit at 38w5d  Blood Pressure: 124/80  Wt=74 4 kg (164 lb); Body mass index is 28 15 kg/m² ; TWG=Not found  Fetal Heart Rate: 142; Fundal Height (cm): 38 cm  Abdomen: gravid, soft, non-tender  Denies uterine contractions  Denies vaginal bleeding or leaking of fluid  Reports adequate fetal movement of at least 10 movements in 2 hours once daily  She has received Tdap  GBS negative  She is scheduled for RLTCS and BTL 2022 at 1000  Chlorhexidine Skin scrub and instructions- pt has  History of preeclampsia, prior pregnancy- she denies any headaches, visual changes or right upper quadrant pain  History of  x2  History of LEEP  A + blood type  Her last ultrasound was in 2022, vertex  Scheduled for ultrasound as indicated  Reviewed labor precautions, preeclamptic precautions and fetal kick counts  Advised to continue medications  Will return to office 1 week after surgery for incision check  COVID 19 precautions were discussed with patient, reviewed symptoms, masking, hygiene, social distancing, avoid high risk gatherings, patient to call office with questions or concerns      Current Outpatient Medications   Medication Instructions    acetaminophen (TYLENOL) 650 mg, Oral, Every 6 hours PRN    aspirin (ECOTRIN LOW STRENGTH) 162 mg, Oral, Daily, Stop at 36 weeks      Blood Pressure Monitoring (Blood Pressure Cuff) MISC Does not apply, Daily    cyclobenzaprine (FLEXERIL) 10 mg, Oral, 3 times daily PRN    docusate sodium (COLACE) 100 mg, Oral, 2 times daily    ferrous sulfate 324 mg, Oral, 2 times daily before meals    folic acid (FOLVITE) 842 mcg, Oral, Daily    Prenatal Vit-Fe Fumarate-FA (prenatal vitamin) 28-0 8 mg 1 tablet, Oral, Daily         pregnancy Problems (from 22 to present)     Problem Noted Resolved    Contraception management 2022 by Mirta Hampton MD No    Overview Signed 2022  8:21 AM by Gracie Funez MD     Desires tubal ligation  MA-31 signed 22         37 weeks gestation of pregnancy 2021 by Bunny Veronica MD No    Overview Signed 3/29/2022  9:27 AM by Gracie Funez MD     RLTCS and BTL scheduled for 22; hibiclens and pre-operative instruction provided         Pregnancy with history of  section, antepartum 2021 by Gracie Funez MD No    Overview Signed 2021 10:43 AM by Gracie Funez MD     Two prior  sections         Hx of preeclampsia, prior pregnancy, currently pregnant, third trimester 2021 by Gracie Funez MD No    Overview Addendum 10/26/2021  8:26 AM by Gracie Funez MD     Needs to complete prenatal panel and baseline preeclamptic labs  Started on 162 mg aspirin daily         Previous Version

## 2022-04-04 NOTE — H&P
H&P Exam - Obstetrics   Anshul Ji 27 y o  female MRN: [id number]  Unit/Bed#:  Encounter: [id number]    Assessment/Plan     Assessment:  26 YO P2 @ 38w5d, Hx preeclampsia in prior pregnancy,  for RLTCS and Permanent sterilization  Plan:  RLTCS + BS  History of Present Illness   Chief Complaint:  with tubal sterilization    HPI:  Anshul Ji is a 27 y o   female with an DALIA of 2022, Date entered prior to episode creation at 38w5d weeks gestation who is being admitted for  with tubal sterilization  Her current obstetrical history is significant for prior   Contractions: None  Leakage of fluid: None  Bleeding: None  Fetal movement: present  Pregnancy complications: none  Review of Systems   Constitutional: Negative for fever  Respiratory: Negative for shortness of breath  Cardiovascular: Negative for chest pain  Gastrointestinal: Negative for abdominal pain  Genitourinary: Negative for vaginal bleeding  Reports normal FM      Historical Information   OB History    Para Term  AB Living   4 2 1 1   2   SAB IAB Ectopic Multiple Live Births           2      # Outcome Date GA Lbr Merrick/2nd Weight Sex Delivery Anes PTL Lv   4 Current            3 Term 18 38w0d   F CS-Unspec EPI  RETA      Complications: Pre-eclampsia   2  06   1588 g (3 lb 8 oz) F CS-Unspec EPI  RETA      Complications: Pre-eclampsia, Bleeding   1               Baby complications/comments: None    Past Medical History:   Diagnosis Date    Hypertension     with pregnancy    Migraine     blurry vision    Migraines     Varicella     vaccinated per pt      Past Surgical History:   Procedure Laterality Date     SECTION      COLPOSCOPY      VT CONIZATION CERVIX,KNIFE/LASER      WISDOM TOOTH EXTRACTION       Social History   Social History     Substance and Sexual Activity   Alcohol Use Not Currently     Social History     Substance and Sexual Activity   Drug Use Never     Social History     Tobacco Use   Smoking Status Never Smoker   Smokeless Tobacco Never Used     E-Cigarette/Vaping    E-Cigarette Use Never User      E-Cigarette/Vaping Substances    Nicotine No     THC No     CBD No     Flavoring No     Other No     Unknown No      Family History: non-contributory    Meds/Allergies   all medications and allergies reviewed  No Known Allergies    Objective   Vitals: Last menstrual period 07/13/2021, not currently breastfeeding  There is no height or weight on file to calculate BMI  Invasive Devices  Report    None                 Physical Exam  Constitutional:       Appearance: Normal appearance  Cardiovascular:      Rate and Rhythm: Normal rate and regular rhythm  Heart sounds: Normal heart sounds  Pulmonary:      Effort: Pulmonary effort is normal       Breath sounds: Normal breath sounds  Abdominal:      Palpations: Abdomen is soft  Skin:     General: Skin is warm and dry  Neurological:      General: No focal deficit present  Mental Status: She is alert  Psychiatric:         Mood and Affect: Mood normal          Behavior: Behavior normal          Prenatal Labs: I have personally reviewed pertinent reports  Imaging, EKG, Pathology, and Other Studies: I have personally reviewed pertinent reports  The patient was counseled regarding indications, risks, benefits and alternatives RLTCS and bilateral salpingectomy  We discussed risks including but not limited to bleeding and potential need for blood transfusion, infection, pain, injury to baby or  surrounding organs such as bladder, intestines, ureters and neurovascular structures  Patient understands potential risks associated stress of surgery and anesthesia including not limited to acute cardiac events, venous thrombosis embolism, etc   All questions answered to patient satisfaction    Patient agrees and wants to proceed with ROCIO and PAMELA

## 2022-04-05 ENCOUNTER — ROUTINE PRENATAL (OUTPATIENT)
Dept: OBGYN CLINIC | Facility: CLINIC | Age: 32
End: 2022-04-05

## 2022-04-05 VITALS
WEIGHT: 164 LBS | DIASTOLIC BLOOD PRESSURE: 80 MMHG | SYSTOLIC BLOOD PRESSURE: 124 MMHG | HEIGHT: 64 IN | HEART RATE: 106 BPM | BODY MASS INDEX: 28 KG/M2

## 2022-04-05 DIAGNOSIS — O09.293 HX OF PREECLAMPSIA, PRIOR PREGNANCY, CURRENTLY PREGNANT, THIRD TRIMESTER: Primary | ICD-10-CM

## 2022-04-05 DIAGNOSIS — Z3A.38 38 WEEKS GESTATION OF PREGNANCY: ICD-10-CM

## 2022-04-05 DIAGNOSIS — O34.219 PREGNANCY WITH HISTORY OF CESAREAN SECTION, ANTEPARTUM: ICD-10-CM

## 2022-04-05 PROCEDURE — 99213 OFFICE O/P EST LOW 20 MIN: CPT | Performed by: NURSE PRACTITIONER

## 2022-04-05 NOTE — PRE-PROCEDURE INSTRUCTIONS
Phone call to patient for pre-operative instructions prior to  via [employer] interpretor #[id number]    Pt was instructed to arrive @0800, 2 hours before her scheduled 1000 OR time  (If the patient is RH negative, she should be told to come in 2 1/2 hours before scheduled OR)    Pt instructed to remain NPO after midnight  *This includes gum, water and hard candy  *If patient takes AM meds (e g hypertensive meds) they may take these meds with a sip of water  *This should not include medications like prenatal vitamins Asprin or colace       Pt was instructed to buy and use a pre-surgical wash containing chlorhexidine the night before and the morning of her scheduled  and to wear clean clothing after the wash      Pt was asked not to wear any jewelry and to leave all of her valuables at home    Pt was asked to leave her suitcases and larger bas in the car, to be brought in after she is assigned a post partum room    Pt was informed that she may have 1 support person in the OR/PACU area and of the current visiting policies

## 2022-04-05 NOTE — PATIENT INSTRUCTIONS
Movimiento fetal   LO QUE NECESITA SABER:   Los movimientos fetales son las patadas, vueltas e hipo del bebé en el [place]  Es posible que usted empiece a sentir estos movimientos aproximadamente a las [address]  A medida que nair bebé crezca, los movimientos se sienten más aimee y son [place] frecuentes  Los movimientos del feto comprueban que nair bebé en el útero está recibiendo el oxígeno y los nutrientes necesarios antes de nacer  La reducción de movimientos fetales podría señalar un problema de la lanette de nair bebé  INSTRUCCIONES SOBRE EL [facility]ITAL[facility]:   [place] a lynsey consultas de control con nair médico u obstetra según le indicaron: Anote lynsey preguntas para que se acuerde de hacerlas chandler lynsey visitas  Movimientos normales del feto: La actividad del jose en el útero puede describirse en 4 estados, del menos activo al [place] [facility]  Chandler el estado de [facility], es probable que nair junior por nacer permanezca quieto un yu de 2 horas  Chandler el estado de [facility], nair bebé patalea, da vueltas y se mueve con frecuencia  Chandler el estado vigilia tranquila, nair junior podría solamente  lynsey ojos  El estado despierto activo incluye patadas aimee y vueltas  Lo que afecta el movimiento fetal: Es posible que sienta a nair bebé moverse más después de que usted haya comido o bebido cafeína  Es probable que usted sienta menos movimientos de nair bebé en el útero cuando está más [place], elle cuando hace ejercicio  También podría sentir menos movimientos del feto si usted es Danika Hidalgo  Ciertos medicamentos pueden afectar los movimientos de nair bebé  Infórmele a nair médico los medicamentos que ga  Monitoreo de los movimientos del bebé en la casa: La mayoría de los movimientos de nair bebé en el útero se notan cuando usted se acuesta silenciosamente de lado  Es probable que nair médico le pida que cuente los movimientos del feto chandler 2 horas   Podría pedirle que registre el tiempo que nair bebé dura para realizar 10 movimientos  Mantenga un registro de los movimientos de nair bebé  Comuníquese con nair médico u obstetra si:  · Tarda más de lo usual para sentir 10 movimientos de nair bebé en el útero  · Usted no siente a nair bebé moverse en el útero por lo menos 10 veces cada 2 horas  · La piel de lynsey kristina, pies y alrededor de lynsey ojos se encuentra más inflamada de lo normal     · Usted tiene dolor de heriberto chandler por lo menos 24 horas  · Le aparecen puntos rojos pequeñitos en la piel  · Nair estómago se siente sensible al aplicarle presión  · Usted tiene preguntas o inquietudes acerca de nair condición o cuidado  Regrese a la laury de emergencias si:  · Usted no siente a nair bebé en el útero moverse por 12 horas  · Usted siente calambres o dolor zach en el abdomen  · Usted tiene sangrado vaginal abundante  · Usted tiene dolor de [place] severo y no puede sara con claridad  · Usted tiene dificultad para respirar o está vomitando  · Usted sufre declan convulsión  © Copyright [employer] 2022 Information is for End User's use only and may not be sold, redistributed or otherwise used for commercial purposes  All illustrations and images included in CareNotes® are the copyrighted property of [employer]  or [address] [place] [address] es sólo para uso en educación  Nair intención no es darle un consejo médico sobre enfermedades o tratamientos  Colsulte con nair Clarine Sours farmacéutico antes de seguir cualquier régimen médico para saber si es seguro y efectivo para usted

## 2022-04-07 ENCOUNTER — ANESTHESIA (INPATIENT)
Dept: LABOR AND DELIVERY | Facility: HOSPITAL | Age: 32
DRG: 539 | End: 2022-04-07
Payer: COMMERCIAL

## 2022-04-07 ENCOUNTER — HOSPITAL ENCOUNTER (INPATIENT)
Facility: HOSPITAL | Age: 32
LOS: 2 days | Discharge: HOME | DRG: 539 | End: 2022-04-09
Attending: OBSTETRICS & GYNECOLOGY | Admitting: OBSTETRICS & GYNECOLOGY
Payer: COMMERCIAL

## 2022-04-07 DIAGNOSIS — O34.219 PREVIOUS CESAREAN SECTION COMPLICATING PREGNANCY: ICD-10-CM

## 2022-04-07 DIAGNOSIS — Z98.891 S/P REPEAT LOW TRANSVERSE C-SECTION: ICD-10-CM

## 2022-04-07 DIAGNOSIS — O34.219 PREGNANCY WITH HISTORY OF CESAREAN SECTION, ANTEPARTUM: Primary | ICD-10-CM

## 2022-04-07 PROBLEM — Z3A.39 39 WEEKS GESTATION OF PREGNANCY: Status: ACTIVE | Noted: 2021-11-25

## 2022-04-07 PROBLEM — Z98.51 S/P TUBAL LIGATION: Status: ACTIVE | Noted: 2022-04-07

## 2022-04-07 LAB
ABO GROUP BLD: NORMAL
ABO GROUP BLD: NORMAL
BASE EXCESS BLDCOA CALC-SCNC: -1.4 MMOL/L (ref 3–11)
BASE EXCESS BLDCOV CALC-SCNC: -2.3 MMOL/L (ref 1–9)
BASOPHILS # BLD AUTO: 0.03 THOUSANDS/ΜL (ref 0–0.1)
BASOPHILS NFR BLD AUTO: 0 % (ref 0–1)
BLD GP AB SCN SERPL QL: NEGATIVE
EOSINOPHIL # BLD AUTO: 0.07 THOUSAND/ΜL (ref 0–0.61)
EOSINOPHIL NFR BLD AUTO: 1 % (ref 0–6)
ERYTHROCYTE [DISTWIDTH] IN BLOOD BY AUTOMATED COUNT: 15.8 % (ref 11.6–15.1)
HCO3 BLDCOA-SCNC: 25.5 MMOL/L (ref 17.3–27.3)
HCO3 BLDCOV-SCNC: 22.6 MMOL/L (ref 12.2–28.6)
HCT VFR BLD AUTO: 33.4 % (ref 34.8–46.1)
HGB BLD-MCNC: 11 G/DL (ref 11.5–15.4)
HIV 1+2 AB+HIV1 P24 AG SERPL QL IA: NORMAL
HIV1 P24 AG SER QL: NORMAL
IMM GRANULOCYTES # BLD AUTO: 0.06 THOUSAND/UL (ref 0–0.2)
IMM GRANULOCYTES NFR BLD AUTO: 1 % (ref 0–2)
LYMPHOCYTES # BLD AUTO: 1.78 THOUSANDS/ΜL (ref 0.6–4.47)
LYMPHOCYTES NFR BLD AUTO: 21 % (ref 14–44)
MCH RBC QN AUTO: 28.2 PG (ref 26.8–34.3)
MCHC RBC AUTO-ENTMCNC: 32.9 G/DL (ref 31.4–37.4)
MCV RBC AUTO: 86 FL (ref 82–98)
MONOCYTES # BLD AUTO: 0.83 THOUSAND/ΜL (ref 0.17–1.22)
MONOCYTES NFR BLD AUTO: 10 % (ref 4–12)
NEUTROPHILS # BLD AUTO: 5.58 THOUSANDS/ΜL (ref 1.85–7.62)
NEUTS SEG NFR BLD AUTO: 67 % (ref 43–75)
NRBC BLD AUTO-RTO: 0 /100 WBCS
O2 CT VFR BLDCOA CALC: 8.9 ML/DL
OXYHGB MFR BLDCOA: 41 %
OXYHGB MFR BLDCOV: 76 %
PCO2 BLDCOA: 50.7 MM[HG] (ref 30–60)
PCO2 BLDCOV: 39.8 MM HG (ref 27–43)
PH BLDCOA: 7.32 [PH] (ref 7.23–7.43)
PH BLDCOV: 7.37 [PH] (ref 7.19–7.49)
PLATELET # BLD AUTO: 129 THOUSANDS/UL (ref 149–390)
PMV BLD AUTO: 11.7 FL (ref 8.9–12.7)
PO2 BLDCOA: 18.5 MM HG (ref 5–25)
PO2 BLDCOV: 31.8 MM HG (ref 15–45)
RBC # BLD AUTO: 3.9 MILLION/UL (ref 3.81–5.12)
RH BLD: POSITIVE
RH BLD: POSITIVE
RPR SER QL: NORMAL
SAO2 % BLDCOV: 17.3 ML/DL
SPECIMEN EXPIRATION DATE: NORMAL
WBC # BLD AUTO: 8.35 THOUSAND/UL (ref 4.31–10.16)

## 2022-04-07 PROCEDURE — 86900 BLOOD TYPING SEROLOGIC ABO: CPT | Performed by: OBSTETRICS & GYNECOLOGY

## 2022-04-07 PROCEDURE — 58611 LIGATE OVIDUCT(S) ADD-ON: CPT | Performed by: OBSTETRICS & GYNECOLOGY

## 2022-04-07 PROCEDURE — 82805 BLOOD GASES W/O2 SATURATION: CPT | Performed by: OBSTETRICS & GYNECOLOGY

## 2022-04-07 PROCEDURE — 86592 SYPHILIS TEST NON-TREP QUAL: CPT | Performed by: OBSTETRICS & GYNECOLOGY

## 2022-04-07 PROCEDURE — 88302 TISSUE EXAM BY PATHOLOGIST: CPT | Performed by: PATHOLOGY

## 2022-04-07 PROCEDURE — 4A1HXCZ MONITORING OF PRODUCTS OF CONCEPTION, CARDIAC RATE, EXTERNAL APPROACH: ICD-10-PCS | Performed by: OBSTETRICS & GYNECOLOGY

## 2022-04-07 PROCEDURE — NC001 PR NO CHARGE: Performed by: OBSTETRICS & GYNECOLOGY

## 2022-04-07 PROCEDURE — 87806 HIV AG W/HIV1&2 ANTB W/OPTIC: CPT | Performed by: OBSTETRICS & GYNECOLOGY

## 2022-04-07 PROCEDURE — 86850 RBC ANTIBODY SCREEN: CPT | Performed by: OBSTETRICS & GYNECOLOGY

## 2022-04-07 PROCEDURE — 85025 COMPLETE CBC W/AUTO DIFF WBC: CPT | Performed by: OBSTETRICS & GYNECOLOGY

## 2022-04-07 PROCEDURE — 86901 BLOOD TYPING SEROLOGIC RH(D): CPT | Performed by: OBSTETRICS & GYNECOLOGY

## 2022-04-07 PROCEDURE — 0UT70ZZ RESECTION OF BILATERAL FALLOPIAN TUBES, OPEN APPROACH: ICD-10-PCS | Performed by: OBSTETRICS & GYNECOLOGY

## 2022-04-07 PROCEDURE — 59514 CESAREAN DELIVERY ONLY: CPT | Performed by: OBSTETRICS & GYNECOLOGY

## 2022-04-07 RX ORDER — ALBUTEROL SULFATE 2.5 MG/3ML
2.5 SOLUTION RESPIRATORY (INHALATION) ONCE AS NEEDED
Status: DISCONTINUED | OUTPATIENT
Start: 2022-04-07 | End: 2022-04-07

## 2022-04-07 RX ORDER — FENTANYL CITRATE/PF 50 MCG/ML
50 SYRINGE (ML) INJECTION
Status: DISCONTINUED | OUTPATIENT
Start: 2022-04-07 | End: 2022-04-07

## 2022-04-07 RX ORDER — HYDROXYZINE HYDROCHLORIDE 25 MG/1
25 TABLET, FILM COATED ORAL EVERY 6 HOURS PRN
Status: DISCONTINUED | OUTPATIENT
Start: 2022-04-07 | End: 2022-04-09 | Stop reason: HOSPADM

## 2022-04-07 RX ORDER — MORPHINE SULFATE 1 MG/ML
INJECTION, SOLUTION EPIDURAL; INTRATHECAL; INTRAVENOUS AS NEEDED
Status: DISCONTINUED | OUTPATIENT
Start: 2022-04-07 | End: 2022-04-07

## 2022-04-07 RX ORDER — DOCUSATE SODIUM 100 MG/1
100 CAPSULE, LIQUID FILLED ORAL 2 TIMES DAILY
Status: DISCONTINUED | OUTPATIENT
Start: 2022-04-07 | End: 2022-04-09 | Stop reason: HOSPADM

## 2022-04-07 RX ORDER — ACETAMINOPHEN 325 MG/1
650 TABLET ORAL EVERY 6 HOURS
Status: DISCONTINUED | OUTPATIENT
Start: 2022-04-07 | End: 2022-04-07

## 2022-04-07 RX ORDER — SIMETHICONE 80 MG
80 TABLET,CHEWABLE ORAL 4 TIMES DAILY PRN
Status: DISCONTINUED | OUTPATIENT
Start: 2022-04-07 | End: 2022-04-09 | Stop reason: HOSPADM

## 2022-04-07 RX ORDER — NALBUPHINE HCL 10 MG/ML
10 AMPUL (ML) INJECTION
Status: DISCONTINUED | OUTPATIENT
Start: 2022-04-07 | End: 2022-04-09 | Stop reason: HOSPADM

## 2022-04-07 RX ORDER — DIPHENHYDRAMINE HYDROCHLORIDE 50 MG/ML
25 INJECTION INTRAMUSCULAR; INTRAVENOUS EVERY 6 HOURS PRN
Status: ACTIVE | OUTPATIENT
Start: 2022-04-07 | End: 2022-04-08

## 2022-04-07 RX ORDER — DEXAMETHASONE SODIUM PHOSPHATE 4 MG/ML
8 INJECTION, SOLUTION INTRA-ARTICULAR; INTRALESIONAL; INTRAMUSCULAR; INTRAVENOUS; SOFT TISSUE ONCE AS NEEDED
Status: ACTIVE | OUTPATIENT
Start: 2022-04-07 | End: 2022-04-08

## 2022-04-07 RX ORDER — HYDROMORPHONE HCL/PF 1 MG/ML
0.5 SYRINGE (ML) INJECTION
Status: DISCONTINUED | OUTPATIENT
Start: 2022-04-07 | End: 2022-04-09 | Stop reason: HOSPADM

## 2022-04-07 RX ORDER — BUPIVACAINE HYDROCHLORIDE 7.5 MG/ML
INJECTION, SOLUTION INTRASPINAL AS NEEDED
Status: DISCONTINUED | OUTPATIENT
Start: 2022-04-07 | End: 2022-04-07

## 2022-04-07 RX ORDER — HYDROMORPHONE HCL/PF 1 MG/ML
0.5 SYRINGE (ML) INJECTION
Status: DISCONTINUED | OUTPATIENT
Start: 2022-04-07 | End: 2022-04-07

## 2022-04-07 RX ORDER — NALOXONE HYDROCHLORIDE 0.4 MG/ML
0.1 INJECTION, SOLUTION INTRAMUSCULAR; INTRAVENOUS; SUBCUTANEOUS
Status: ACTIVE | OUTPATIENT
Start: 2022-04-07 | End: 2022-04-08

## 2022-04-07 RX ORDER — CEFAZOLIN SODIUM 1 G/50ML
1000 SOLUTION INTRAVENOUS ONCE
Status: COMPLETED | OUTPATIENT
Start: 2022-04-07 | End: 2022-04-07

## 2022-04-07 RX ORDER — DEXAMETHASONE SODIUM PHOSPHATE 4 MG/ML
INJECTION, SOLUTION INTRA-ARTICULAR; INTRALESIONAL; INTRAMUSCULAR; INTRAVENOUS; SOFT TISSUE AS NEEDED
Status: DISCONTINUED | OUTPATIENT
Start: 2022-04-07 | End: 2022-04-07

## 2022-04-07 RX ORDER — METHYLERGONOVINE MALEATE 0.2 MG/ML
INJECTION INTRAVENOUS AS NEEDED
Status: DISCONTINUED | OUTPATIENT
Start: 2022-04-07 | End: 2022-04-07

## 2022-04-07 RX ORDER — FENTANYL CITRATE 50 UG/ML
INJECTION, SOLUTION INTRAMUSCULAR; INTRAVENOUS AS NEEDED
Status: DISCONTINUED | OUTPATIENT
Start: 2022-04-07 | End: 2022-04-07

## 2022-04-07 RX ORDER — KETOROLAC TROMETHAMINE 30 MG/ML
30 INJECTION, SOLUTION INTRAMUSCULAR; INTRAVENOUS EVERY 6 HOURS
Status: COMPLETED | OUTPATIENT
Start: 2022-04-07 | End: 2022-04-08

## 2022-04-07 RX ORDER — MIDAZOLAM HYDROCHLORIDE 2 MG/2ML
INJECTION, SOLUTION INTRAMUSCULAR; INTRAVENOUS AS NEEDED
Status: DISCONTINUED | OUTPATIENT
Start: 2022-04-07 | End: 2022-04-07

## 2022-04-07 RX ORDER — ACETAMINOPHEN 325 MG/1
650 TABLET ORAL EVERY 6 HOURS SCHEDULED
Status: DISCONTINUED | OUTPATIENT
Start: 2022-04-07 | End: 2022-04-09 | Stop reason: HOSPADM

## 2022-04-07 RX ORDER — ONDANSETRON 2 MG/ML
INJECTION INTRAMUSCULAR; INTRAVENOUS AS NEEDED
Status: DISCONTINUED | OUTPATIENT
Start: 2022-04-07 | End: 2022-04-07

## 2022-04-07 RX ORDER — EPINEPHRINE 1 MG/ML
INJECTION, SOLUTION, CONCENTRATE INTRAVENOUS AS NEEDED
Status: DISCONTINUED | OUTPATIENT
Start: 2022-04-07 | End: 2022-04-07

## 2022-04-07 RX ORDER — OXYCODONE HYDROCHLORIDE 10 MG/1
10 TABLET ORAL EVERY 4 HOURS PRN
Status: DISCONTINUED | OUTPATIENT
Start: 2022-04-08 | End: 2022-04-09 | Stop reason: HOSPADM

## 2022-04-07 RX ORDER — ONDANSETRON 2 MG/ML
4 INJECTION INTRAMUSCULAR; INTRAVENOUS EVERY 8 HOURS PRN
Status: DISCONTINUED | OUTPATIENT
Start: 2022-04-08 | End: 2022-04-09 | Stop reason: HOSPADM

## 2022-04-07 RX ORDER — ONDANSETRON 2 MG/ML
4 INJECTION INTRAMUSCULAR; INTRAVENOUS ONCE AS NEEDED
Status: DISCONTINUED | OUTPATIENT
Start: 2022-04-07 | End: 2022-04-07

## 2022-04-07 RX ORDER — OXYCODONE HYDROCHLORIDE 5 MG/1
5 TABLET ORAL EVERY 4 HOURS PRN
Status: DISCONTINUED | OUTPATIENT
Start: 2022-04-08 | End: 2022-04-09 | Stop reason: HOSPADM

## 2022-04-07 RX ORDER — METOCLOPRAMIDE HYDROCHLORIDE 5 MG/ML
5 INJECTION INTRAMUSCULAR; INTRAVENOUS EVERY 6 HOURS PRN
Status: ACTIVE | OUTPATIENT
Start: 2022-04-07 | End: 2022-04-08

## 2022-04-07 RX ORDER — IBUPROFEN 600 MG/1
600 TABLET ORAL EVERY 6 HOURS SCHEDULED
Status: DISCONTINUED | OUTPATIENT
Start: 2022-04-08 | End: 2022-04-09 | Stop reason: HOSPADM

## 2022-04-07 RX ORDER — DIPHENHYDRAMINE HCL 25 MG
25 TABLET ORAL EVERY 6 HOURS PRN
Status: DISCONTINUED | OUTPATIENT
Start: 2022-04-07 | End: 2022-04-09 | Stop reason: HOSPADM

## 2022-04-07 RX ORDER — CALCIUM CARBONATE 200(500)MG
1000 TABLET,CHEWABLE ORAL 3 TIMES DAILY PRN
Status: DISCONTINUED | OUTPATIENT
Start: 2022-04-07 | End: 2022-04-09 | Stop reason: HOSPADM

## 2022-04-07 RX ORDER — OXYTOCIN/RINGER'S LACTATE 30/500 ML
62.5 PLASTIC BAG, INJECTION (ML) INTRAVENOUS CONTINUOUS
Status: ACTIVE | OUTPATIENT
Start: 2022-04-07 | End: 2022-04-07

## 2022-04-07 RX ORDER — SODIUM CHLORIDE, SODIUM LACTATE, POTASSIUM CHLORIDE, CALCIUM CHLORIDE 600; 310; 30; 20 MG/100ML; MG/100ML; MG/100ML; MG/100ML
125 INJECTION, SOLUTION INTRAVENOUS CONTINUOUS
Status: DISCONTINUED | OUTPATIENT
Start: 2022-04-07 | End: 2022-04-09 | Stop reason: HOSPADM

## 2022-04-07 RX ORDER — ONDANSETRON 2 MG/ML
4 INJECTION INTRAMUSCULAR; INTRAVENOUS EVERY 4 HOURS PRN
Status: ACTIVE | OUTPATIENT
Start: 2022-04-07 | End: 2022-04-08

## 2022-04-07 RX ORDER — OXYTOCIN/RINGER'S LACTATE 30/500 ML
PLASTIC BAG, INJECTION (ML) INTRAVENOUS CONTINUOUS PRN
Status: DISCONTINUED | OUTPATIENT
Start: 2022-04-07 | End: 2022-04-07

## 2022-04-07 RX ORDER — KETOROLAC TROMETHAMINE 30 MG/ML
INJECTION, SOLUTION INTRAMUSCULAR; INTRAVENOUS AS NEEDED
Status: DISCONTINUED | OUTPATIENT
Start: 2022-04-07 | End: 2022-04-07

## 2022-04-07 RX ADMIN — SODIUM CHLORIDE, SODIUM LACTATE, POTASSIUM CHLORIDE, AND CALCIUM CHLORIDE 1000 ML: .6; .31; .03; .02 INJECTION, SOLUTION INTRAVENOUS at 08:45

## 2022-04-07 RX ADMIN — MORPHINE SULFATE 0.2 MG: 1 INJECTION, SOLUTION EPIDURAL; INTRATHECAL; INTRAVENOUS at 10:34

## 2022-04-07 RX ADMIN — PHENYLEPHRINE HYDROCHLORIDE 50 MCG/MIN: 10 INJECTION INTRAVENOUS at 10:34

## 2022-04-07 RX ADMIN — DEXAMETHASONE SODIUM PHOSPHATE 10 MG: 4 INJECTION INTRA-ARTICULAR; INTRALESIONAL; INTRAMUSCULAR; INTRAVENOUS; SOFT TISSUE at 11:04

## 2022-04-07 RX ADMIN — MORPHINE SULFATE 2.5 MG: 1 INJECTION, SOLUTION EPIDURAL; INTRATHECAL; INTRAVENOUS at 11:50

## 2022-04-07 RX ADMIN — Medication 800 MILLI-UNITS/MIN: at 11:00

## 2022-04-07 RX ADMIN — MIDAZOLAM HYDROCHLORIDE 2 MG: 1 INJECTION, SOLUTION INTRAMUSCULAR; INTRAVENOUS at 11:02

## 2022-04-07 RX ADMIN — BUPIVACAINE HYDROCHLORIDE IN DEXTROSE 1.6 ML: 7.5 INJECTION, SOLUTION SUBARACHNOID at 10:34

## 2022-04-07 RX ADMIN — SODIUM CHLORIDE, SODIUM LACTATE, POTASSIUM CHLORIDE, AND CALCIUM CHLORIDE 500 ML: .6; .31; .03; .02 INJECTION, SOLUTION INTRAVENOUS at 22:38

## 2022-04-07 RX ADMIN — ONDANSETRON 4 MG: 2 INJECTION INTRAMUSCULAR; INTRAVENOUS at 11:04

## 2022-04-07 RX ADMIN — EPINEPHRINE 0.03 MG: 1 INJECTION, SOLUTION INTRAMUSCULAR; SUBCUTANEOUS at 10:34

## 2022-04-07 RX ADMIN — KETOROLAC TROMETHAMINE 30 MG: 30 INJECTION, SOLUTION INTRAMUSCULAR at 11:46

## 2022-04-07 RX ADMIN — SODIUM CHLORIDE, SODIUM LACTATE, POTASSIUM CHLORIDE, AND CALCIUM CHLORIDE 125 ML/HR: .6; .31; .03; .02 INJECTION, SOLUTION INTRAVENOUS at 16:17

## 2022-04-07 RX ADMIN — SODIUM CHLORIDE, SODIUM LACTATE, POTASSIUM CHLORIDE, AND CALCIUM CHLORIDE 125 ML/HR: .6; .31; .03; .02 INJECTION, SOLUTION INTRAVENOUS at 09:32

## 2022-04-07 RX ADMIN — FENTANYL CITRATE 50 MCG: 50 INJECTION, SOLUTION INTRAMUSCULAR; INTRAVENOUS at 11:04

## 2022-04-07 RX ADMIN — DOCUSATE SODIUM 100 MG: 100 CAPSULE, LIQUID FILLED ORAL at 17:28

## 2022-04-07 RX ADMIN — MORPHINE SULFATE 2.3 MG: 1 INJECTION, SOLUTION EPIDURAL; INTRATHECAL; INTRAVENOUS at 11:36

## 2022-04-07 RX ADMIN — FENTANYL CITRATE 50 MCG: 50 INJECTION INTRAMUSCULAR; INTRAVENOUS at 13:10

## 2022-04-07 RX ADMIN — CEFAZOLIN SODIUM 1000 MG: 1 SOLUTION INTRAVENOUS at 10:35

## 2022-04-07 RX ADMIN — FENTANYL CITRATE 50 MCG: 50 INJECTION, SOLUTION INTRAMUSCULAR; INTRAVENOUS at 11:11

## 2022-04-07 RX ADMIN — KETOROLAC TROMETHAMINE 30 MG: 30 INJECTION, SOLUTION INTRAMUSCULAR at 17:28

## 2022-04-07 RX ADMIN — FENTANYL CITRATE 50 MCG: 50 INJECTION INTRAMUSCULAR; INTRAVENOUS at 12:30

## 2022-04-07 RX ADMIN — METHYLERGONOVINE MALEATE 0.2 MG: 0.2 INJECTION, SOLUTION INTRAMUSCULAR; INTRAVENOUS at 11:32

## 2022-04-07 RX ADMIN — KETOROLAC TROMETHAMINE 30 MG: 30 INJECTION, SOLUTION INTRAMUSCULAR at 23:29

## 2022-04-07 NOTE — ANESTHESIA PROCEDURE NOTES
Spinal Block    Patient location during procedure: OR  Start time: 4/7/2022 10:34 AM  Reason for block: procedure for pain and at surgeon's request  Staffing  Performed: CRNA   Anesthesiologist: Manuel Saez MD  Resident/CRNA: Merlin Medina CRNA  Preanesthetic Checklist  Completed: patient identified, IV checked, site marked, risks and benefits discussed, surgical consent, monitors and equipment checked, pre-op evaluation and timeout performed  Spinal Block  Patient position: sitting  Prep: ChloraPrep  Patient monitoring: cardiac monitor and frequent blood pressure checks  Approach: midline  Location: L3-4  Injection technique: single-shot  Needle  Needle type: pencil-tip   Needle gauge: 25 G  Needle length: 10 cm  Assessment  Events: paresthesia  Injection Assessment:  negative aspiration for heme, no paresthesia on injection and positive aspiration for clear CSF  Post-procedure:  adhesive bandage applied, pressure dressing applied, secured with tape, site cleaned and sterile dressing applied  Additional Notes  Positive CSF  Upon slow injection transient paraesthesia  Stopped  Repositioned Spinal needle  Positive csf again  No paraesthesia on slow injection    Has T8 level

## 2022-04-07 NOTE — ANESTHESIA PREPROCEDURE EVALUATION
Procedure:   SECTION () REPEAT (N/A Uterus)  LIGATION/COAGULATION TUBAL (Bilateral Abdomen)    Relevant Problems   GYN   (+) 39 weeks gestation of pregnancy   (+) Pregnancy with history of  section, antepartum      NEURO/PSYCH   (+) History of  delivery   (+) Hx of preeclampsia, prior pregnancy, currently pregnant, third trimester        Physical Exam    Airway    Mallampati score: III  TM Distance: >3 FB  Neck ROM: full     Dental   No notable dental hx     Cardiovascular  Rhythm: regular, Rate: normal, Cardiovascular exam normal    Pulmonary  Pulmonary exam normal Breath sounds clear to auscultation,     Other Findings        Anesthesia Plan  ASA Score- 2     Anesthesia Type- spinal with ASA Monitors  Additional Monitors:   Airway Plan: ETT  Comment: Discussed the risks/benefits of neuraxial anesthesia, including risk of bleeding/infection/damage to underlying structures, the likely side effect of nausea, and unlikely complication of spinal headache  Plan to maintain native airway with EtCO2 monitoring under spinal anesthesia, general anesthesia with OETT discussed as backup          Plan Factors-Exercise tolerance (METS): >4 METS  Patient summary reviewed  Patient instructed to abstain from smoking on day of procedure  Patient did not smoke on day of surgery  Induction-     Postoperative Plan- Plan for postoperative opioid use  Informed Consent- Anesthetic plan and risks discussed with patient  I personally reviewed this patient with the CRNA  Discussed and agreed on the Anesthesia Plan with the CRNA  Flakito Lucero

## 2022-04-07 NOTE — PLAN OF CARE
Problem: BIRTH - VAGINAL/ SECTION  Goal: Fetal and maternal status remain reassuring during the birth process  Description: INTERVENTIONS:  - Monitor vital signs  - Monitor fetal heart rate  - Monitor uterine activity  - Monitor labor progression (vaginal delivery)  - DVT prophylaxis  - Antibiotic prophylaxis  Outcome: Progressing  Goal: Emotionally satisfying birthing experience for mother/fetus  Description: Interventions:  - Assess, plan, implement and evaluate the nursing care given to the patient in labor  - Advocate the philosophy that each childbirth experience is a unique experience and support the family's chosen level of involvement and control during the labor process   - Actively participate in both the patient's and family's teaching of the birth process  - Consider cultural, Gnosticism and age-specific factors and plan care for the patient in labor  Outcome: Progressing     Problem: PAIN - ADULT  Goal: Verbalizes/displays adequate comfort level or baseline comfort level  Description: Interventions:  - Encourage patient to monitor pain and request assistance  - Assess pain using appropriate pain scale  - Administer analgesics based on type and severity of pain and evaluate response  - Implement non-pharmacological measures as appropriate and evaluate response  - Consider cultural and social influences on pain and pain management  - Notify physician/advanced practitioner if interventions unsuccessful or patient reports new pain  Outcome: Progressing     Problem: INFECTION - ADULT  Goal: Absence or prevention of progression during hospitalization  Description: INTERVENTIONS:  - Assess and monitor for signs and symptoms of infection  - Monitor lab/diagnostic results  - Monitor all insertion sites, i e  indwelling lines, tubes, and drains  - Monitor endotracheal if appropriate and nasal secretions for changes in amount and color  - [place] appropriate cooling/warming therapies per order  - Administer medications as ordered  - Instruct and encourage patient and family to use good hand hygiene technique  - Identify and instruct in appropriate isolation precautions for identified infection/condition  Outcome: Progressing  Goal: Absence of fever/infection during neutropenic period  Description: INTERVENTIONS:  - Monitor WBC    Outcome: Progressing     Problem: SAFETY ADULT  Goal: Patient will remain free of falls  Description: INTERVENTIONS:  - Educate patient/family on patient safety including physical limitations  - Instruct patient to call for assistance with activity   - Consult OT/PT to assist with strengthening/mobility   - Keep Call bell within reach  - Keep bed low and locked with side rails adjusted as appropriate  - Keep care items and personal belongings within reach  - Initiate and maintain comfort rounds  - Make Fall Risk Sign visible to staff  - Apply yellow socks and bracelet for high fall risk patients  - Consider moving patient to room near nurses station  Outcome: Progressing  Goal: Maintain or return to baseline ADL function  Description: INTERVENTIONS:  -  Assess patient's ability to carry out ADLs; assess patient's baseline for ADL function and identify physical deficits which impact ability to perform ADLs (bathing, care of mouth/teeth, toileting, grooming, dressing, etc )  - Assess/evaluate cause of self-care deficits   - Assess range of motion  - Assess patient's mobility; develop plan if impaired  - Assess patient's need for assistive devices and provide as appropriate  - Encourage maximum independence but intervene and supervise when necessary  - Involve family in performance of ADLs  - Assess for home care needs following discharge   - Consider OT consult to assist with ADL evaluation and planning for discharge  - Provide patient education as appropriate  Outcome: Progressing  Goal: Maintains/Returns to pre admission functional level  Description: INTERVENTIONS:  - Perform BMAT or MOVE assessment daily    - Set and communicate daily mobility goal to care team and patient/family/caregiver  - Collaborate with rehabilitation services on mobility goals if consulted  - Out of bed for toileting  - Record patient progress and toleration of activity level   Outcome: Progressing     Problem: Knowledge Deficit  Goal: Patient/family/caregiver demonstrates understanding of disease process, treatment plan, medications, and discharge instructions  Description: Complete learning assessment and assess knowledge base    Interventions:  - Provide teaching at level of understanding  - Provide teaching via preferred learning methods  Outcome: Progressing     Problem: DISCHARGE PLANNING  Goal: Discharge to home or other facility with appropriate resources  Description: INTERVENTIONS:  - Identify barriers to discharge w/patient and caregiver  - Arrange for needed discharge resources and transportation as appropriate  - Identify discharge learning needs (meds, wound care, etc )  - Arrange for interpretive services to assist at discharge as needed  - Refer to Case Management Department for coordinating discharge planning if the patient needs post-hospital services based on physician/advanced practitioner order or complex needs related to functional status, cognitive ability, or social support system  Outcome: Progressing

## 2022-04-07 NOTE — ANESTHESIA POSTPROCEDURE EVALUATION
Post-Op Assessment Note    CV Status:  Stable  Pain Score: 0    Pain management: adequate     Mental Status:  Alert and awake   Hydration Status:  Euvolemic   PONV Controlled:  Controlled   Airway Patency:  Patent      Post Op Vitals Reviewed: Yes      Staff: Anesthesiologist, CRNA         No complications documented      BP  111/66    Temp   97 3   Pulse  88   Resp   18   SpO2   97%

## 2022-04-07 NOTE — INTERIM OP NOTE
SECTION () REPEAT, LIGATION/COAGULATION TUBAL  Postoperative Note  PATIENT NAME: Juan Madrigal  : 1990  MRN: [id number]  [facility] L&D OR ROOM 02    Surgery Date: 2022    Preop Diagnosis:  Previous  section complicating pregnancy [H52 553]    Post-Op Diagnosis Codes:     * Previous  section complicating pregnancy [T07 557]    Procedure(s) (LRB):   SECTION () REPEAT (N/A)  LIGATION/COAGULATION TUBAL (Bilateral)    Surgeon(s) and Role:     * Cassandra Estrada MD - Primary     * Kelton Habermann, MD - Assisting    Specimens:  ID Type Source Tests Collected by Time Destination   1 :  Tissue Fallopian Tube, Right TISSUE EXAM Cassandra Estrada MD 2022 1115    2 : portion of L tube Tissue Fallopian Tube, Left TISSUE EXAM Cassandra Estrada MD 2022 1123    A :  Cord Blood Cord BLOOD GAS, VENOUS, CORD, BLOOD GAS, ARTERIAL, CORD Cassandra Estrada MD 2022 1105    B :  Tissue (Placenta on Hold) OB Only Placenta PLACENTA IN STORAGE Cassandra Estrada MD 2022 1108        Estimated Blood Loss:   800 ml    Anesthesia Type:   Spinal    Findings:   VTX, Viable Male  with spontaneous cry, good tone, and good color  Dense adhesions from anterior surface of the uterus to anterior abdominal wall  Window in lower uterine segment  Adhesions were taken down to gain access to upper part of the uterus in-order to make a transverse incision 3cm above the window      Complications:   None    SIGNATURE: Cassandra Estrada MD   DATE: 2022   TIME: 11:51 AM

## 2022-04-07 NOTE — PLAN OF CARE
Problem: PAIN - ADULT  Goal: Verbalizes/displays adequate comfort level or baseline comfort level  Description: Interventions:  - Encourage patient to monitor pain and request assistance  - Assess pain using appropriate pain scale  - Administer analgesics based on type and severity of pain and evaluate response  - Implement non-pharmacological measures as appropriate and evaluate response  - Consider cultural and social influences on pain and pain management  - Notify physician/advanced practitioner if interventions unsuccessful or patient reports new pain  Outcome: Progressing     Problem: INFECTION - ADULT  Goal: Absence or prevention of progression during hospitalization  Description: INTERVENTIONS:  - Assess and monitor for signs and symptoms of infection  - Monitor lab/diagnostic results  - Monitor all insertion sites, i e  indwelling lines, tubes, and drains  - Monitor endotracheal if appropriate and nasal secretions for changes in amount and color  - [place] appropriate cooling/warming therapies per order  - Administer medications as ordered  - Instruct and encourage patient and family to use good hand hygiene technique  - Identify and instruct in appropriate isolation precautions for identified infection/condition  Outcome: Progressing  Goal: Absence of fever/infection during neutropenic period  Description: INTERVENTIONS:  - Monitor WBC    Outcome: Progressing     Problem: SAFETY ADULT  Goal: Patient will remain free of falls  Description: INTERVENTIONS:  - Educate patient/family on patient safety including physical limitations  - Instruct patient to call for assistance with activity   - Consult OT/PT to assist with strengthening/mobility   - Keep Call bell within reach  - Keep bed low and locked with side rails adjusted as appropriate  - Keep care items and personal belongings within reach  - Initiate and maintain comfort rounds  - Make Fall Risk Sign visible to staff  - Apply yellow socks and bracelet for high fall risk patients  - Consider moving patient to room near nurses station  Outcome: Progressing  Goal: Maintain or return to baseline ADL function  Description: INTERVENTIONS:  -  Assess patient's ability to carry out ADLs; assess patient's baseline for ADL function and identify physical deficits which impact ability to perform ADLs (bathing, care of mouth/teeth, toileting, grooming, dressing, etc )  - Assess/evaluate cause of self-care deficits   - Assess range of motion  - Assess patient's mobility; develop plan if impaired  - Assess patient's need for assistive devices and provide as appropriate  - Encourage maximum independence but intervene and supervise when necessary  - Involve family in performance of ADLs  - Assess for home care needs following discharge   - Consider OT consult to assist with ADL evaluation and planning for discharge  - Provide patient education as appropriate  Outcome: Progressing  Goal: Maintains/Returns to pre admission functional level  Description: INTERVENTIONS:  - Perform BMAT or MOVE assessment daily    - Set and communicate daily mobility goal to care team and patient/family/caregiver  - Collaborate with rehabilitation services on mobility goals if consulted  - Out of bed for toileting  - Record patient progress and toleration of activity level   Outcome: Progressing     Problem: Knowledge Deficit  Goal: Patient/family/caregiver demonstrates understanding of disease process, treatment plan, medications, and discharge instructions  Description: Complete learning assessment and assess knowledge base    Interventions:  - Provide teaching at level of understanding  - Provide teaching via preferred learning methods  Outcome: Progressing     Problem: DISCHARGE PLANNING  Goal: Discharge to home or other facility with appropriate resources  Description: INTERVENTIONS:  - Identify barriers to discharge w/patient and caregiver  - Arrange for needed discharge resources and transportation as appropriate  - Identify discharge learning needs (meds, wound care, etc )  - Arrange for interpretive services to assist at discharge as needed  - Refer to Case Management Department for coordinating discharge planning if the patient needs post-hospital services based on physician/advanced practitioner order or complex needs related to functional status, cognitive ability, or social support system  Outcome: Progressing

## 2022-04-07 NOTE — PLAN OF CARE
Problem: PAIN - ADULT  Goal: Verbalizes/displays adequate comfort level or baseline comfort level  Description: Interventions:  - Encourage patient to monitor pain and request assistance  - Assess pain using appropriate pain scale  - Administer analgesics based on type and severity of pain and evaluate response  - Implement non-pharmacological measures as appropriate and evaluate response  - Consider cultural and social influences on pain and pain management  - Notify physician/advanced practitioner if interventions unsuccessful or patient reports new pain  Outcome: Progressing     Problem: INFECTION - ADULT  Goal: Absence or prevention of progression during hospitalization  Description: INTERVENTIONS:  - Assess and monitor for signs and symptoms of infection  - Monitor lab/diagnostic results  - Monitor all insertion sites, i e  indwelling lines, tubes, and drains  - Monitor endotracheal if appropriate and nasal secretions for changes in amount and color  - [place] appropriate cooling/warming therapies per order  - Administer medications as ordered  - Instruct and encourage patient and family to use good hand hygiene technique  - Identify and instruct in appropriate isolation precautions for identified infection/condition  Outcome: Progressing  Goal: Absence of fever/infection during neutropenic period  Description: INTERVENTIONS:  - Monitor WBC    Outcome: Progressing     Problem: SAFETY ADULT  Goal: Patient will remain free of falls  Description: INTERVENTIONS:  - Educate patient/family on patient safety including physical limitations  - Instruct patient to call for assistance with activity   - Consult OT/PT to assist with strengthening/mobility   - Keep Call bell within reach  - Keep bed low and locked with side rails adjusted as appropriate  - Keep care items and personal belongings within reach  - Initiate and maintain comfort rounds  - Make Fall Risk Sign visible to staff  - Offer Toileting every  Hours, in advance of need  - Initiate/Maintain alarm  - Obtain necessary fall risk management equipment:   - Apply yellow socks and bracelet for high fall risk patients  - Consider moving patient to room near nurses station  Outcome: Progressing  Goal: Maintain or return to baseline ADL function  Description: INTERVENTIONS:  -  Assess patient's ability to carry out ADLs; assess patient's baseline for ADL function and identify physical deficits which impact ability to perform ADLs (bathing, care of mouth/teeth, toileting, grooming, dressing, etc )  - Assess/evaluate cause of self-care deficits   - Assess range of motion  - Assess patient's mobility; develop plan if impaired  - Assess patient's need for assistive devices and provide as appropriate  - Encourage maximum independence but intervene and supervise when necessary  - Involve family in performance of ADLs  - Assess for home care needs following discharge   - Consider OT consult to assist with ADL evaluation and planning for discharge  - Provide patient education as appropriate  Outcome: Progressing  Goal: Maintains/Returns to pre admission functional level  Description: INTERVENTIONS:  - Perform BMAT or MOVE assessment daily    - Set and communicate daily mobility goal to care team and patient/family/caregiver  - Collaborate with rehabilitation services on mobility goals if consulted  - Perform Range of Motion  times a day  - Reposition patient every  hours    - Dangle patient  times a day  - Stand patient  times a day  - Ambulate patient  times a day  - Out of bed to chair  times a day   - Out of bed for meals times a day  - Out of bed for toileting  - Record patient progress and toleration of activity level   Outcome: Progressing     Problem: Knowledge Deficit  Goal: Patient/family/caregiver demonstrates understanding of disease process, treatment plan, medications, and discharge instructions  Description: Complete learning assessment and assess knowledge base   Interventions:  - Provide teaching at level of understanding  - Provide teaching via preferred learning methods  Outcome: Progressing     Problem: DISCHARGE PLANNING  Goal: Discharge to home or other facility with appropriate resources  Description: INTERVENTIONS:  - Identify barriers to discharge w/patient and caregiver  - Arrange for needed discharge resources and transportation as appropriate  - Identify discharge learning needs (meds, wound care, etc )  - Arrange for interpretive services to assist at discharge as needed  - Refer to Case Management Department for coordinating discharge planning if the patient needs post-hospital services based on physician/advanced practitioner order or complex needs related to functional status, cognitive ability, or social support system  Outcome: Progressing     Problem: POSTPARTUM  Goal: Experiences normal postpartum course  Description: INTERVENTIONS:  - Monitor maternal vital signs  - Assess uterine involution and lochia  Outcome: Progressing  Goal: Appropriate maternal -  bonding  Description: INTERVENTIONS:  - Identify family support  - Assess for appropriate maternal/infant bonding   -Encourage maternal/infant bonding opportunities  - Referral to  or  as needed  Outcome: Progressing  Goal: Establishment of infant feeding pattern  Description: INTERVENTIONS:  - Assess breast/bottle feeding  - Refer to lactation as needed  Outcome: Progressing  Goal: Incision(s), wounds(s) or drain site(s) healing without S/S of infection  Description: INTERVENTIONS  - Assess and document dressing, incision, wound bed, drain sites and surrounding tissue  - Provide patient and family education  - Perform skin care/dressing changes every   Outcome: Progressing

## 2022-04-07 NOTE — PLAN OF CARE
Problem: POSTPARTUM  Goal: Experiences normal postpartum course  Description: INTERVENTIONS:  - Monitor maternal vital signs  - Assess uterine involution and lochia  Outcome: Progressing  Goal: Appropriate maternal -  bonding  Description: INTERVENTIONS:  - Identify family support  - Assess for appropriate maternal/infant bonding   -Encourage maternal/infant bonding opportunities  - Referral to  or  as needed  Outcome: Progressing  Goal: Establishment of infant feeding pattern  Description: INTERVENTIONS:  - Assess breast/bottle feeding  - Refer to lactation as needed  Outcome: Progressing  Goal: Incision(s), wounds(s) or drain site(s) healing without S/S of infection  Description: INTERVENTIONS  - Assess and document dressing, incision, wound bed, drain sites and surrounding tissue  - Provide patient and family education  - Perform skin care/dressing changes   Outcome: Progressing     Problem: PAIN - ADULT  Goal: Verbalizes/displays adequate comfort level or baseline comfort level  Description: Interventions:  - Encourage patient to monitor pain and request assistance  - Assess pain using appropriate pain scale  - Administer analgesics based on type and severity of pain and evaluate response  - Implement non-pharmacological measures as appropriate and evaluate response  - Consider cultural and social influences on pain and pain management  - Notify physician/advanced practitioner if interventions unsuccessful or patient reports new pain  Outcome: Progressing     Problem: INFECTION - ADULT  Goal: Absence or prevention of progression during hospitalization  Description: INTERVENTIONS:  - Assess and monitor for signs and symptoms of infection  - Monitor lab/diagnostic results  - Monitor all insertion sites, i e  indwelling lines, tubes, and drains  - Monitor endotracheal if appropriate and nasal secretions for changes in amount and color  - Lee appropriate cooling/warming therapies per order  - Administer medications as ordered  - Instruct and encourage patient and family to use good hand hygiene technique  - Identify and instruct in appropriate isolation precautions for identified infection/condition  Outcome: Progressing  Goal: Absence of fever/infection during neutropenic period  Description: INTERVENTIONS:  - Monitor WBC    Outcome: Progressing     Problem: SAFETY ADULT  Goal: Patient will remain free of falls  Description: INTERVENTIONS:  - Educate patient/family on patient safety including physical limitations  - Instruct patient to call for assistance with activity   - Consult OT/PT to assist with strengthening/mobility   - Keep Call bell within reach  - Keep bed low and locked with side rails adjusted as appropriate  - Keep care items and personal belongings within reach  - Initiate and maintain comfort rounds  - Make Fall Risk Sign visible to staff  - Offer Toileting   - Initiate/Maintain   - Obtain necessary fall risk management equipment:   - Apply yellow socks and bracelet for high fall risk patients  - Consider moving patient to room near nurses station  Outcome: Progressing  Goal: Maintain or return to baseline ADL function  Description: INTERVENTIONS:  -  Assess patient's ability to carry out ADLs; assess patient's baseline for ADL function and identify physical deficits which impact ability to perform ADLs (bathing, care of mouth/teeth, toileting, grooming, dressing, etc )  - Assess/evaluate cause of self-care deficits   - Assess range of motion  - Assess patient's mobility; develop plan if impaired  - Assess patient's need for assistive devices and provide as appropriate  - Encourage maximum independence but intervene and supervise when necessary  - Involve family in performance of ADLs  - Assess for home care needs following discharge   - Consider OT consult to assist with ADL evaluation and planning for discharge  - Provide patient education as appropriate  Outcome: Progressing  Goal: Maintains/Returns to pre admission functional level  Description: INTERVENTIONS:  - Perform BMAT or MOVE assessment daily    - Set and communicate daily mobility goal to care team and patient/family/caregiver  - Collaborate with rehabilitation services on mobility goals if consulted  - Perform Range of Motion   - Reposition patient   - Dangle patient  - Stand patient  - Ambulate patient   - Out of bed to chair   - Out of bed for meals  - Out of bed for toileting  - Record patient progress and toleration of activity level   Outcome: Progressing     Problem: Knowledge Deficit  Goal: Patient/family/caregiver demonstrates understanding of disease process, treatment plan, medications, and discharge instructions  Description: Complete learning assessment and assess knowledge base    Interventions:  - Provide teaching at level of understanding  - Provide teaching via preferred learning methods  Outcome: Progressing     Problem: DISCHARGE PLANNING  Goal: Discharge to home or other facility with appropriate resources  Description: INTERVENTIONS:  - Identify barriers to discharge w/patient and caregiver  - Arrange for needed discharge resources and transportation as appropriate  - Identify discharge learning needs (meds, wound care, etc )  - Arrange for interpretive services to assist at discharge as needed  - Refer to Case Management Department for coordinating discharge planning if the patient needs post-hospital services based on physician/advanced practitioner order or complex needs related to functional status, cognitive ability, or social support system  Outcome: Progressing

## 2022-04-07 NOTE — OP NOTE
OPERATIVE REPORT  PATIENT NAME: Juan Madrigal    :  1990  MRN: [id number]  Pt Location: [facility] L[facility] OR ROOM 02    SURGERY DATE: 2022    Surgeon(s) and Role:     * Cassandra Estrada MD - Primary     * Kelton Habermann, MD - Assisting    Preop Diagnosis:  Previous  section complicating pregnancy [Z10 414]    Post-Op Diagnosis Codes:     * Previous  section complicating pregnancy [S17 588]    Procedure(s) (LRB):   SECTION () REPEAT (N/A)  LIGATION/COAGULATION TUBAL (Bilateral)    Specimen(s):  ID Type Source Tests Collected by Time Destination   1 :  Tissue Fallopian Tube, Right TISSUE EXAM Cassandra Estrada MD 2022 1115    2 : portion of L tube Tissue Fallopian Tube, Left TISSUE EXAM Cassandra Estrada MD 2022 1123    A :  Cord Blood Cord BLOOD GAS, VENOUS, CORD, BLOOD GAS, ARTERIAL, CORD Cassandra Estrada MD 2022 1105    B :  Tissue (Placenta on Hold) OB Only Placenta PLACENTA IN STORAGE Cassandra Estrada MD 2022 1108        QBL: 682    Drains:  Urethral Catheter Double-lumen;Non-latex;Straight-tip 16 Fr  (Active)   Reasons to continue Urinary Catheter  Post-operative urological requirements 22 1210   Goal for Removal Remove POD#1 22 1210   Site Assessment Clean;Skin intact 22 1210   Collection Container Standard drainage bag 22 1210   Number of days: 0       Anesthesia Type:   Spinal    Operative Indications:  Previous  section complicating pregnancy [N39 498]  Deisre for permanent sterilization    Operative Findings:  1  Viable male  at 36 with APGARs of 9 and 9 at 1 and 5 minutes  Fetus weighted 8lb 13oz  2  Normal intact placenta with centrally inserted 3VC expressed at 1104  3  Normal uterus, bilateral tubes and ovaries  4  Blood gases:   Arterial pH:  7 319   Arterial base excess:  -1 4   Venous pH:  7 373   Venous base excess:  -2 3    Procedure:    The patient was taken to the operating room  Spinal anesthesia was adequately established and Ancef was given for preoperative prophylaxis  The patient was then placed in a supine position with a left lateral tilt  Fetal heart tones were noted to be normal  The patient was prepped with chloraprep for abdominal prep and vaginal prep and a Anand catheter was placed in sterile fashion  The patient was  draped in the usual sterile fashion for a Pfannenstiel skin incision  A time out was performed to confirm correct patient and correct procedure  An incision was made in the skin with a surgical scalpel  Sharp and blunt dissection was used to reach the level of the rectus fascia  Bovie electrocautery was utilitzed for hemostasis during this process  The fascia was incised transversely at the midline and the fascial incision was extended bilaterally using Oconnell scissors  The superior edge of the fascial incision was grasped with Kocher clamps, tented up and the underlying rectus muscles were dissected off bluntly and sharply using the scalpel  The inferior edge of the fascial incision was then dissected off the rectus muscles with blunt and sharp dissection using the Oconnell scissors  The rectus muscles were then divided at the midline  The peritoneum was identified, tented up at its upper margin taking care to avoid the bladder, and then entered bluntly  The peritoneal incision and rectus muscle were extended bilaterally bluntly with gentle traction  It was at this time that an adhesion was noted between the anterior uterus and the anterior abdominal wall  This was carefully isolated and taken down with Bovie electrocautery  The Adair-O ring retractor was inserted  Then, a transverse incision was made in the lower uterine segment using a new surgical blade  The uterine incision was extended cephalad and caudal using blunt dissection  The placenta was noted to be directly above the level of the hysterotomy   The amniotic sac was entered bluntly and the amniotic fluid was noted to be clear  The surgeon's hand was placed into the uterine cavity  The fetus was noted to be in vertex presentation, and the presenting part was grasped and delivered through the uterine incision with the assistance of fundal pressure  There was a loose nuchal cord x1, which was reduced at the level of the hysterotomy  The infant's oral and nasal passages were bulb suctioned  After delivery the cord was doubly clamped and cut  The infant was then passed off the table to the awaiting  staff  Venous and arterial blood gas, cord blood, and portion of cord was obtained for analysis and routine blood testing  The placenta then delivered spontaneously with the assistance of traction on the cord  The placenta was noted to be intact with a centrally inserted three-vessel cord  Oxytocin was administered by IV infusion to enhance uterine contraction  The uterus was cleared of all clots and debris by blunt curretage with a moist lap sponge  The uterine incision was reapproximated using a 0 Vicryl in a running locked fashion  A second horizontal imbricating stitch with 0 Vicryl was applied  The uterine incision was examined and noted to be hemostatic  At this point, our attention turned to the adnexa  The bilateral Fallopian tubes were identified  First the right fallopian tube was identified, grasped with a Huang Hawking in an avascular area, and gently tented up  A window was made in an avascular area of mesosalpinx with Bovie electrocautery, and plain gut suture was used to ligate the distal portion of mesosalpinx  An additional window was made in a more proximal portion of avascular mesosalpinx, and the process was repeated  This was continued until approximately 2 cm distal to the right uterine cornu, where the fallopian tube was transversely ligated  The tube was then excised with Bovie electrocautery    One additional suture of plain gut was used to ligate a small bleeding vessel on the right adnexa  Attention was then turned to the left adnexa  There was significant adhesive disease on this side, which precluded the ability to completely excise the left Fallopian tube  The uterus was exteriorized  The left fimbriae and tube were identified, and the isthmus was grasped with a Adah in an avascular area and tented up  Using 2-0 plain gut suture, this tented up portion of tube was ligated  The knuckle of tube was then excised with Metzenbaum scissors  Good hemostasis was noted from this tube  Good hemostasis was noted on this both sides  At this point, the posterior cul-de-sac was irrigated and cleared of all clots  The uterus was replaced into the abdomen, and the Adair-O ring retractor was removed  The pericolic gutters were cleared of all clots  The uterus was examined, and oozing was noted from the anterior uterine serosa, in the location of the previosly excised adhesion  Three vertical mattress sutures of 0 Vicryl were thrown in this area, and good hemostasis was noted  The uterine incision and bilateral tubal ligation sites were once again reexamined and noted to be hemostatic  The fascia was then reapproximated using 0 Vicryl in a running nonlocked fashion  The subcutaneous tissue was irrigated and cleared of all clots and debris  Good hemostasis was noted achieved with Bovie electrocautery  The subcutaneous tissue was reapproximated with four interrupted throws of 2-0 Plain suture  The skin incision was closed with 4-0 monocryl and Exofin  Good hemostasis was noted  Telfa was placed on top of the skin incision in a sterile fashion as a surgical dressing  All needle, sponge, and instrument counts were noted to be correct x 2 at the end of the procedure  The patient was then cleansed and transferred to the recovery room      Overall, the patient tolerated the procedure well and is currently in stable condition in the PACU with her   Dr Tanis Koyanagi was present for the entire procedure        Complications:   None    I was present for the entire procedure    Patient Disposition:  PACU       SIGNATURE: Kerri Lakhani MD  DATE: 2022  TIME: 12:21 PM

## 2022-04-08 LAB
ERYTHROCYTE [DISTWIDTH] IN BLOOD BY AUTOMATED COUNT: 15.4 % (ref 11.6–15.1)
HCT VFR BLD AUTO: 23.2 % (ref 34.8–46.1)
HGB BLD-MCNC: 7.8 G/DL (ref 11.5–15.4)
MCH RBC QN AUTO: 28.1 PG (ref 26.8–34.3)
MCHC RBC AUTO-ENTMCNC: 32.8 G/DL (ref 31.4–37.4)
MCV RBC AUTO: 86 FL (ref 82–98)
PLATELET # BLD AUTO: 129 THOUSANDS/UL (ref 149–390)
PMV BLD AUTO: 11.7 FL (ref 8.9–12.7)
RBC # BLD AUTO: 2.7 MILLION/UL (ref 3.81–5.12)
WBC # BLD AUTO: 12.44 THOUSAND/UL (ref 4.31–10.16)

## 2022-04-08 PROCEDURE — 99024 POSTOP FOLLOW-UP VISIT: CPT | Performed by: OBSTETRICS & GYNECOLOGY

## 2022-04-08 PROCEDURE — 85027 COMPLETE CBC AUTOMATED: CPT | Performed by: OBSTETRICS & GYNECOLOGY

## 2022-04-08 RX ADMIN — IBUPROFEN 600 MG: 600 TABLET ORAL at 18:01

## 2022-04-08 RX ADMIN — ENOXAPARIN SODIUM 40 MG: 40 INJECTION SUBCUTANEOUS at 09:38

## 2022-04-08 RX ADMIN — IRON SUCROSE 200 MG: 20 INJECTION, SOLUTION INTRAVENOUS at 08:39

## 2022-04-08 RX ADMIN — ACETAMINOPHEN 650 MG: 325 TABLET ORAL at 23:12

## 2022-04-08 RX ADMIN — ACETAMINOPHEN 650 MG: 325 TABLET ORAL at 09:54

## 2022-04-08 RX ADMIN — DOCUSATE SODIUM 100 MG: 100 CAPSULE, LIQUID FILLED ORAL at 09:38

## 2022-04-08 RX ADMIN — ACETAMINOPHEN 650 MG: 325 TABLET ORAL at 04:18

## 2022-04-08 RX ADMIN — DOCUSATE SODIUM 100 MG: 100 CAPSULE, LIQUID FILLED ORAL at 18:01

## 2022-04-08 RX ADMIN — NALBUPHINE HYDROCHLORIDE 10 MG: 10 INJECTION, SOLUTION INTRAMUSCULAR; INTRAVENOUS; SUBCUTANEOUS at 04:18

## 2022-04-08 RX ADMIN — ACETAMINOPHEN 650 MG: 325 TABLET ORAL at 16:56

## 2022-04-08 RX ADMIN — OXYCODONE HYDROCHLORIDE 5 MG: 5 TABLET ORAL at 20:17

## 2022-04-08 RX ADMIN — KETOROLAC TROMETHAMINE 30 MG: 30 INJECTION, SOLUTION INTRAMUSCULAR at 12:07

## 2022-04-08 RX ADMIN — KETOROLAC TROMETHAMINE 30 MG: 30 INJECTION, SOLUTION INTRAMUSCULAR at 06:06

## 2022-04-08 RX ADMIN — IBUPROFEN 600 MG: 600 TABLET ORAL at 23:11

## 2022-04-08 NOTE — PLAN OF CARE
Problem: POSTPARTUM  Goal: Experiences normal postpartum course  Description: INTERVENTIONS:  - Monitor maternal vital signs  - Assess uterine involution and lochia  Outcome: Progressing  Goal: Appropriate maternal -  bonding  Description: INTERVENTIONS:  - Identify family support  - Assess for appropriate maternal/infant bonding   -Encourage maternal/infant bonding opportunities  - Referral to  or  as needed  Outcome: Progressing  Goal: Establishment of infant feeding pattern  Description: INTERVENTIONS:  - Assess breast/bottle feeding  - Refer to lactation as needed  Outcome: Progressing  Goal: Incision(s), wounds(s) or drain site(s) healing without S/S of infection  Description: INTERVENTIONS  - Assess and document dressing, incision, wound bed, drain sites and surrounding tissue  - Provide patient and family education  - Perform skin care/dressing changes every   Outcome: Progressing     Problem: PAIN - ADULT  Goal: Verbalizes/displays adequate comfort level or baseline comfort level  Description: Interventions:  - Encourage patient to monitor pain and request assistance  - Assess pain using appropriate pain scale  - Administer analgesics based on type and severity of pain and evaluate response  - Implement non-pharmacological measures as appropriate and evaluate response  - Consider cultural and social influences on pain and pain management  - Notify physician/advanced practitioner if interventions unsuccessful or patient reports new pain  Outcome: Progressing     Problem: INFECTION - ADULT  Goal: Absence or prevention of progression during hospitalization  Description: INTERVENTIONS:  - Assess and monitor for signs and symptoms of infection  - Monitor lab/diagnostic results  - Monitor all insertion sites, i e  indwelling lines, tubes, and drains  - Monitor endotracheal if appropriate and nasal secretions for changes in amount and color  - [place] appropriate cooling/warming therapies per order  - Administer medications as ordered  - Instruct and encourage patient and family to use good hand hygiene technique  - Identify and instruct in appropriate isolation precautions for identified infection/condition  Outcome: Progressing  Goal: Absence of fever/infection during neutropenic period  Description: INTERVENTIONS:  - Monitor WBC    Outcome: Progressing     Problem: SAFETY ADULT  Goal: Patient will remain free of falls  Description: INTERVENTIONS:  - Educate patient/family on patient safety including physical limitations  - Instruct patient to call for assistance with activity   - Consult OT/PT to assist with strengthening/mobility   - Keep Call bell within reach  - Keep bed low and locked with side rails adjusted as appropriate  - Keep care items and personal belongings within reach  - Initiate and maintain comfort rounds  - Make Fall Risk Sign visible to staff  - Offer Toileting every Hours, in advance of need  - Initiate/Maintain alarm  - Obtain necessary fall risk management equipment:   - Apply yellow socks and bracelet for high fall risk patients  - Consider moving patient to room near nurses station  Outcome: Progressing  Goal: Maintain or return to baseline ADL function  Description: INTERVENTIONS:  -  Assess patient's ability to carry out ADLs; assess patient's baseline for ADL function and identify physical deficits which impact ability to perform ADLs (bathing, care of mouth/teeth, toileting, grooming, dressing, etc )  - Assess/evaluate cause of self-care deficits   - Assess range of motion  - Assess patient's mobility; develop plan if impaired  - Assess patient's need for assistive devices and provide as appropriate  - Encourage maximum independence but intervene and supervise when necessary  - Involve family in performance of ADLs  - Assess for home care needs following discharge   - Consider OT consult to assist with ADL evaluation and planning for discharge  - Provide patient education as appropriate  Outcome: Progressing  Goal: Maintains/Returns to pre admission functional level  Description: INTERVENTIONS:  - Perform BMAT or MOVE assessment daily    - Set and communicate daily mobility goal to care team and patient/family/caregiver  - Collaborate with rehabilitation services on mobility goals if consulted  - Perform Range of Motion times a day  - Reposition patient every hours  - Dangle patient times a day  - Stand patient times a day  - Ambulate patient times a day  - Out of bed to chair times a day   - Out of bed for meals times a day  - Out of bed for toileting  - Record patient progress and toleration of activity level   Outcome: Progressing     Problem: Knowledge Deficit  Goal: Patient/family/caregiver demonstrates understanding of disease process, treatment plan, medications, and discharge instructions  Description: Complete learning assessment and assess knowledge base    Interventions:  - Provide teaching at level of understanding  - Provide teaching via preferred learning methods  Outcome: Progressing     Problem: DISCHARGE PLANNING  Goal: Discharge to home or other facility with appropriate resources  Description: INTERVENTIONS:  - Identify barriers to discharge w/patient and caregiver  - Arrange for needed discharge resources and transportation as appropriate  - Identify discharge learning needs (meds, wound care, etc )  - Arrange for interpretive services to assist at discharge as needed  - Refer to Case Management Department for coordinating discharge planning if the patient needs post-hospital services based on physician/advanced practitioner order or complex needs related to functional status, cognitive ability, or social support system  Outcome: Progressing

## 2022-04-08 NOTE — PROGRESS NOTES
Progress Note - OB/GYN   Jaydentinaedwin Reyes 27 y o  female MRN: [id number]  Unit/Bed#:  304-01 Encounter: [id number]    Assessment:  Post partum Day #1 s/p RLTCS, R salpingectomy, and L tubal ligation, stable, baby in room    Plan:  1) Postoperative state   QBL 682cc, Hgb 11 0 --> methergine intraop --> 7 8, venofer ordered   Anand catheter removed, passed void trial   DVT ppx: Lovenox daily  2) Continue routine post partum care   Encourage ambulation   Encourage breastfeeding   Anticipate discharge POD#1     Subjective/Objective   Chief Complaint:     Post delivery  Patient is doing well  Lochia WNL  Pain well controlled  Subjective:     Pain: yes, cramping, improved with meds  Tolerating PO: yes  Voiding: yes  Flatus: yes  Ambulating: yes  Chest pain: no  Shortness of breath: no  Leg pain: no  Lochia: minimal    Objective:     Vitals: /64 (BP Location: Left arm)   Pulse 89   Temp 98 2 °F (36 8 °C) (Oral)   Resp 18   LMP 07/13/2021 (Approximate)   SpO2 98%   Breastfeeding No     I/O       04/06 0701  04/07 0700 04/07 0701  04/08 0700    P  O   460    I V   1400    IV Piggyback  500    Total Intake  2360    Urine  2125    Blood  682    Total Output  2807    Net  -447                Lab Results   Component Value Date    WBC 12 44 (H) 04/08/2022    HGB 7 8 (L) 04/08/2022    HCT 23 2 (L) 04/08/2022    MCV 86 04/08/2022     (L) 04/08/2022       Physical Exam:     Gen: AAOx3, NAD  CV: RRR  Lungs: CTA b/l  Abd: Soft, non-tender, non-distended, no rebound or guarding  Uterine fundus firm and non-tender, 1 cm below the umbilicus   Incision c/d/I  Ext: Non tender    Raji Arreguin MD  4/8/2022  6:33 AM

## 2022-04-09 VITALS
RESPIRATION RATE: 20 BRPM | SYSTOLIC BLOOD PRESSURE: 105 MMHG | OXYGEN SATURATION: 99 % | DIASTOLIC BLOOD PRESSURE: 65 MMHG | HEART RATE: 95 BPM | TEMPERATURE: 97.9 F

## 2022-04-09 PROCEDURE — 99024 POSTOP FOLLOW-UP VISIT: CPT | Performed by: STUDENT IN AN ORGANIZED HEALTH CARE EDUCATION/TRAINING PROGRAM

## 2022-04-09 RX ORDER — IBUPROFEN 600 MG/1
600 TABLET ORAL EVERY 6 HOURS SCHEDULED
Qty: 30 TABLET | Refills: 0 | Status: SHIPPED | OUTPATIENT
Start: 2022-04-09 | End: 2022-04-09

## 2022-04-09 RX ORDER — OXYCODONE HYDROCHLORIDE 5 MG/1
5 TABLET ORAL EVERY 4 HOURS PRN
Qty: 10 TABLET | Refills: 0 | Status: SHIPPED | OUTPATIENT
Start: 2022-04-09 | End: 2022-04-09

## 2022-04-09 RX ORDER — IBUPROFEN 600 MG/1
600 TABLET ORAL EVERY 6 HOURS SCHEDULED
Qty: 30 TABLET | Refills: 0 | Status: SHIPPED | OUTPATIENT
Start: 2022-04-09

## 2022-04-09 RX ORDER — OXYCODONE HYDROCHLORIDE 5 MG/1
5 TABLET ORAL EVERY 4 HOURS PRN
Qty: 10 TABLET | Refills: 0 | Status: SHIPPED | OUTPATIENT
Start: 2022-04-09 | End: 2022-04-19

## 2022-04-09 RX ADMIN — DOCUSATE SODIUM 100 MG: 100 CAPSULE, LIQUID FILLED ORAL at 10:56

## 2022-04-09 RX ADMIN — ENOXAPARIN SODIUM 40 MG: 40 INJECTION SUBCUTANEOUS at 10:56

## 2022-04-09 RX ADMIN — ACETAMINOPHEN 650 MG: 325 TABLET ORAL at 05:44

## 2022-04-09 RX ADMIN — ACETAMINOPHEN 650 MG: 325 TABLET ORAL at 10:56

## 2022-04-09 RX ADMIN — IBUPROFEN 600 MG: 600 TABLET ORAL at 12:16

## 2022-04-09 RX ADMIN — IBUPROFEN 600 MG: 600 TABLET ORAL at 05:44

## 2022-04-09 NOTE — DISCHARGE SUMMARY
CS Discharge Summary - Gian Schmitz [address] [place] [address] 27 y o  female MRN: [id number]    Unit/Bed#: -01 Encounter: [id number]    Admission Date: 2022     Discharge Date: 22    Admitting Diagnosis:   Patient Active Problem List   Diagnosis    Pregnancy with history of  section, antepartum    Hx of preeclampsia, prior pregnancy, currently pregnant, third trimester    History of  delivery    H/O LEEP    39 weeks gestation of pregnancy     Discharge Diagnosis:   Same, delivered    Procedures:   repeat  section, low transverse incision  Tubal ligation    Admitting Attending: Dr Stephanie Boas  Delivery Attending: Dr Stephanie Boas  Discharge Attending: Dr Anderson Adams Course:     Krystal Baird is a 27 y o  [id number] who was admitted for repeat low transverse  section and tubal ligation  She then underwent a repeat  section delivery and delivered a viable male  on 2022 at 1103  APGARS were 9, 9 at 1 and 5 minutes, respectively   weighed 8 lb 13 oz  Placenta was delivered shortly after   was then transferred to  nursery  Patient tolerated the procedure well and was transferred to recovery in stable condition  The patient's post partum course was unremarkable    Preoperative hemaglobin was 11 0, postoperative was 7 8  She received a dose of IV Venofer  Her postoperative pain was well controlled with oral analgesics  On day of discharge, she was ambulating and able to reasonably perform all ADLs  She was voiding and had appropriate bowel function  Pain was well controlled  She was discharged home on post-operative day #2 without complications  Patient was instructed to follow up with her OB as an outpatient and was given appropriate warnings to call provider if she develops signs of infection or uncontrolled pain       Complications:   None    Condition at discharge:   good     Provisions for Follow-Up Care:  See after visit summary for information related to follow-up care and any pertinent home health orders  Disposition:   See After Visit Summary for discharge disposition information  Planned Readmission:   No    Discharge Medications:   Prenatal vitamin daily for 6 months or the duration of nursing whichever is longer  Motrin 600 mg orally every 6 hours as needed for pain  Tylenol (over the counter) per bottle directions as needed for pain  Hydrocortisone cream 1% (over the counter) applied 1-2x daily to hemorrhoids as needed  Witch hazel pads for hemorrhoidal discomfort as needed      Discharge instructions :   -Do not place anything (no partner, tampons or douche) in your vagina for 6 weeks  -You may walk for exercise for the first 6 weeks then gradually return to your usual activities    -Please do not drive for 1 week if you have no stitches and for 2 weeks if you have stitches or underwent a  delivery     -You may take baths or shower per your preference    -Please look at your bust (breasts) in the mirror daily and call provider for redness or tenderness or increased warmth  - If you have had a  please look at your incision daily as well and call provider for increasing redness or steady drainage from the incision    -Please call your provider if temperature > 100 4*F or 38* C, worsening pain or a foul discharge      Concepcion Blackman MD

## 2022-04-09 NOTE — PLAN OF CARE
Problem: POSTPARTUM  Goal: Experiences normal postpartum course  Description: INTERVENTIONS:  - Monitor maternal vital signs  - Assess uterine involution and lochia  Outcome: Progressing  Goal: Appropriate maternal -  bonding  Description: INTERVENTIONS:  - Identify family support  - Assess for appropriate maternal/infant bonding   -Encourage maternal/infant bonding opportunities  - Referral to  or  as needed  Outcome: Progressing  Goal: Establishment of infant feeding pattern  Description: INTERVENTIONS:  - Assess breast/bottle feeding  - Refer to lactation as needed  Outcome: Progressing  Goal: Incision(s), wounds(s) or drain site(s) healing without S/S of infection  Description: INTERVENTIONS  - Assess and document dressing, incision, wound bed, drain sites and surrounding tissue  - Provide patient and family education  - Perform skin care/dressing changes every   Outcome: Progressing     Problem: PAIN - ADULT  Goal: Verbalizes/displays adequate comfort level or baseline comfort level  Description: Interventions:  - Encourage patient to monitor pain and request assistance  - Assess pain using appropriate pain scale  - Administer analgesics based on type and severity of pain and evaluate response  - Implement non-pharmacological measures as appropriate and evaluate response  - Consider cultural and social influences on pain and pain management  - Notify physician/advanced practitioner if interventions unsuccessful or patient reports new pain  Outcome: Progressing     Problem: INFECTION - ADULT  Goal: Absence or prevention of progression during hospitalization  Description: INTERVENTIONS:  - Assess and monitor for signs and symptoms of infection  - Monitor lab/diagnostic results  - Monitor all insertion sites, i e  indwelling lines, tubes, and drains  - Monitor endotracheal if appropriate and nasal secretions for changes in amount and color  - [place] appropriate cooling/warming therapies per order  - Administer medications as ordered  - Instruct and encourage patient and family to use good hand hygiene technique  - Identify and instruct in appropriate isolation precautions for identified infection/condition  Outcome: Progressing  Goal: Absence of fever/infection during neutropenic period  Description: INTERVENTIONS:  - Monitor WBC    Outcome: Progressing     Problem: SAFETY ADULT  Goal: Patient will remain free of falls  Description: INTERVENTIONS:  - Educate patient/family on patient safety including physical limitations  - Instruct patient to call for assistance with activity   - Consult OT/PT to assist with strengthening/mobility   - Keep Call bell within reach  - Keep bed low and locked with side rails adjusted as appropriate  - Keep care items and personal belongings within reach  - Initiate and maintain comfort rounds  - Make Fall Risk Sign visible to staff  - Offer Toileting every  Hours, in advance of need  - Initiate/Maintain alarm  - Obtain necessary fall risk management equipment:   - Apply yellow socks and bracelet for high fall risk patients  - Consider moving patient to room near nurses station  Outcome: Progressing  Goal: Maintain or return to baseline ADL function  Description: INTERVENTIONS:  -  Assess patient's ability to carry out ADLs; assess patient's baseline for ADL function and identify physical deficits which impact ability to perform ADLs (bathing, care of mouth/teeth, toileting, grooming, dressing, etc )  - Assess/evaluate cause of self-care deficits   - Assess range of motion  - Assess patient's mobility; develop plan if impaired  - Assess patient's need for assistive devices and provide as appropriate  - Encourage maximum independence but intervene and supervise when necessary  - Involve family in performance of ADLs  - Assess for home care needs following discharge   - Consider OT consult to assist with ADL evaluation and planning for discharge  - Provide patient education as appropriate  Outcome: Progressing  Goal: Maintains/Returns to pre admission functional level  Description: INTERVENTIONS:  - Perform BMAT or MOVE assessment daily    - Set and communicate daily mobility goal to care team and patient/family/caregiver  - Collaborate with rehabilitation services on mobility goals if consulted  - Perform Range of Motion  times a day  - Reposition patient every  hours  - Dangle patient  times a day  - Stand patient  times a day  - Ambulate patient  times a day  - Out of bed to chair  times a day   - Out of bed for meals times a day  - Out of bed for toileting  - Record patient progress and toleration of activity level   Outcome: Progressing     Problem: Knowledge Deficit  Goal: Patient/family/caregiver demonstrates understanding of disease process, treatment plan, medications, and discharge instructions  Description: Complete learning assessment and assess knowledge base    Interventions:  - Provide teaching at level of understanding  - Provide teaching via preferred learning methods  Outcome: Progressing     Problem: DISCHARGE PLANNING  Goal: Discharge to home or other facility with appropriate resources  Description: INTERVENTIONS:  - Identify barriers to discharge w/patient and caregiver  - Arrange for needed discharge resources and transportation as appropriate  - Identify discharge learning needs (meds, wound care, etc )  - Arrange for interpretive services to assist at discharge as needed  - Refer to Case Management Department for coordinating discharge planning if the patient needs post-hospital services based on physician/advanced practitioner order or complex needs related to functional status, cognitive ability, or social support system  Outcome: Progressing

## 2022-04-09 NOTE — PROGRESS NOTES
Progress Note - OB/GYN   Neema Gutierrez Sermon 27 y o  female MRN: [id number]  Unit/Bed#:  304-01 Encounter: [id number]    Assessment:  Post partum Day #2 s/p RLTCS, R salpingectomy, and L tubal ligation, stable, baby in room     Plan:  1) Postoperative state              QBL 682cc, Hgb 11 0 --> methergine intraop --> 7 8, venofer ordered              Anand catheter removed, passed void trial              DVT ppx: Lovenox daily  2) Continue routine post partum care              Encourage ambulation              Encourage breastfeeding              Anticipate discharge POD#2  Subjective/Objective   Chief Complaint:     Post delivery  Patient is doing well  Lochia WNL  Pain well controlled  Subjective:     Pain: yes, cramping, improved with meds  Tolerating PO: yes  Voiding: yes  Flatus: yes  Ambulating: yes  Chest pain: no  Shortness of breath: no  Leg pain: no  Lochia: minimal    Objective:     Vitals: BP 99/60 (BP Location: Left arm)   Pulse 99   Temp 98 2 °F (36 8 °C) (Oral)   Resp 18   LMP 07/13/2021 (Approximate)   SpO2 97%   Breastfeeding No     I/O       04/07 0701 04/08 0700 04/08 0701 04/09 0700 04/09 0701  04/10 0700    P  O  460      I V  1400      IV Piggyback 500      Total Intake 2360      Urine 2425 800     Blood 682      Total Output 3107 800     Net -099 -461                  Lab Results   Component Value Date    WBC 12 44 (H) 04/08/2022    HGB 7 8 (L) 04/08/2022    HCT 23 2 (L) 04/08/2022    MCV 86 04/08/2022     (L) 04/08/2022       Physical Exam:     Gen: AAOx3, NAD  CV: RRR  Lungs: CTA b/l  Abd: Soft, non-tender, non-distended, no rebound or guarding  Uterine fundus firm and non-tender, 1 cm below the umbilicus   Incision c/d/I  Ext: Non tender    Justino Ruiz MD  4/9/2022  7:51 AM

## 2022-04-09 NOTE — PLAN OF CARE
Problem: POSTPARTUM  Goal: Experiences normal postpartum course  Description: INTERVENTIONS:  - Monitor maternal vital signs  - Assess uterine involution and lochia  Outcome: Completed  Goal: Appropriate maternal -  bonding  Description: INTERVENTIONS:  - Identify family support  - Assess for appropriate maternal/infant bonding   -Encourage maternal/infant bonding opportunities  - Referral to  or  as needed  Outcome: Completed  Goal: Establishment of infant feeding pattern  Description: INTERVENTIONS:  - Assess breast/bottle feeding  - Refer to lactation as needed  Outcome: Completed  Goal: Incision(s), wounds(s) or drain site(s) healing without S/S of infection  Description: INTERVENTIONS  - Assess and document dressing, incision, wound bed, drain sites and surrounding tissue  - Provide patient and family education  Outcome: Completed     Problem: PAIN - ADULT  Goal: Verbalizes/displays adequate comfort level or baseline comfort level  Description: Interventions:  - Encourage patient to monitor pain and request assistance  - Assess pain using appropriate pain scale  - Administer analgesics based on type and severity of pain and evaluate response  - Implement non-pharmacological measures as appropriate and evaluate response  - Consider cultural and social influences on pain and pain management  - Notify physician/advanced practitioner if interventions unsuccessful or patient reports new pain  Outcome: Completed     Problem: INFECTION - ADULT  Goal: Absence or prevention of progression during hospitalization  Description: INTERVENTIONS:  - Assess and monitor for signs and symptoms of infection  - Monitor lab/diagnostic results  - Monitor all insertion sites, i e  indwelling lines, tubes, and drains  - Monitor endotracheal if appropriate and nasal secretions for changes in amount and color  - [place] appropriate cooling/warming therapies per order  - Administer medications as ordered  - Instruct and encourage patient and family to use good hand hygiene technique  - Identify and instruct in appropriate isolation precautions for identified infection/condition  Outcome: Completed  Goal: Absence of fever/infection during neutropenic period  Description: INTERVENTIONS:  - Monitor WBC    Outcome: Completed     Problem: SAFETY ADULT  Goal: Patient will remain free of falls  Description: INTERVENTIONS:  - Educate patient/family on patient safety including physical limitations  - Instruct patient to call for assistance with activity   - Consult OT/PT to assist with strengthening/mobility   - Keep Call bell within reach  - Keep bed low and locked with side rails adjusted as appropriate  - Keep care items and personal belongings within reach  - Initiate and maintain comfort rounds  - Make Fall Risk Sign visible to staff  - Apply yellow socks and bracelet for high fall risk patients  - Consider moving patient to room near nurses station  Outcome: Completed  Goal: Maintain or return to baseline ADL function  Description: INTERVENTIONS:  -  Assess patient's ability to carry out ADLs; assess patient's baseline for ADL function and identify physical deficits which impact ability to perform ADLs (bathing, care of mouth/teeth, toileting, grooming, dressing, etc )  - Assess/evaluate cause of self-care deficits   - Assess range of motion  - Assess patient's mobility; develop plan if impaired  - Assess patient's need for assistive devices and provide as appropriate  - Encourage maximum independence but intervene and supervise when necessary  - Involve family in performance of ADLs  - Assess for home care needs following discharge   - Consider OT consult to assist with ADL evaluation and planning for discharge  - Provide patient education as appropriate  Outcome: Completed  Goal: Maintains/Returns to pre admission functional level  Description: INTERVENTIONS:  - Perform BMAT or MOVE assessment daily    - Set and communicate daily mobility goal to care team and patient/family/caregiver  - Collaborate with rehabilitation services on mobility goals if consulted  - Out of bed for toileting  - Record patient progress and toleration of activity level   Outcome: Completed     Problem: Knowledge Deficit  Goal: Patient/family/caregiver demonstrates understanding of disease process, treatment plan, medications, and discharge instructions  Description: Complete learning assessment and assess knowledge base    Interventions:  - Provide teaching at level of understanding  - Provide teaching via preferred learning methods  Outcome: Completed     Problem: DISCHARGE PLANNING  Goal: Discharge to home or other facility with appropriate resources  Description: INTERVENTIONS:  - Identify barriers to discharge w/patient and caregiver  - Arrange for needed discharge resources and transportation as appropriate  - Identify discharge learning needs (meds, wound care, etc )  - Arrange for interpretive services to assist at discharge as needed  - Refer to Case Management Department for coordinating discharge planning if the patient needs post-hospital services based on physician/advanced practitioner order or complex needs related to functional status, cognitive ability, or social support system  Outcome: Completed

## 2022-04-11 ENCOUNTER — TRANSITIONAL CARE MANAGEMENT (OUTPATIENT)
Dept: FAMILY MEDICINE CLINIC | Facility: CLINIC | Age: 32
End: 2022-04-11

## 2022-04-11 NOTE — UTILIZATION REVIEW
Inpatient Admission Authorization Request   Notification of Maternity/Delivery &  Birth Information for Admission   SERVICING FACILITY:   [address] [facility] [address] [place] [address]  Tax ID: [id number]  NPI: [id number]  Place of [facility]patient [facility]  [address]  Place of Service Code: 24     ATTENDING PROVIDER:  Attending Name and NPI#: Noam Phoenix Md [[id number]]  Address: Dang Donaldo  [facility], [address] [place] [address]  Phone: [phone]     UTILIZATION REVIEW CONTACT:  Mirian Peña, Utilization Review Supervisor  Network Utilization Review Department  Phone: [phone]  Fax [phone]  Email: [email or web address]     PHYSICIAN ADVISORY SERVICES:  FOR LEYQ-TS-EVNG REVIEW - MEDICAL NECESSITY DENIAL  Phone: [phone]  Fax: [phone]  Email: [email or web address]     TYPE OF REQUEST:  Inpatient Status     ADMISSION INFORMATION:  ADMISSION DATE/TIME: 22  7:58 AM  PATIENT DIAGNOSIS CODE/DESCRIPTION:  Previous  section complicating pregnancy [S03 174]  39 weeks gestation of pregnancy [Z3A 39]  Encounter for  delivery without indication [O82] The primary encounter diagnosis was Pregnancy with history of  section, antepartum  Diagnoses of Previous  section complicating pregnancy and S/P repeat low transverse  were also pertinent to this visit  1  Pregnancy with history of  section, antepartum    2  Previous  section complicating pregnancy    3   S/P repeat low transverse       DISCHARGE DATE/TIME: 2022  1:45 PM   MOTHER AND  INFORMATION:  Mother: Krystal Baird 1988   Delivering clinician:    OB History        3    Para   3    Term   2       1    AB   0    Living   3       SAB   0    IAB   0    Ectopic   0    Multiple   0    Live Births   3               [place] Name & MRN:   Information for the patient's :  Shahbaz Barton [[id number]]      Delivery Information:  Sex: male  Delivered 2022 11:03 AM by , Low Transverse; Gestational Age: 36w3d     Measurements:  Weight: 8 lb 13 oz (3997 g); Height: 20"    APGAR 1 minute 5 minutes 10 minutes   Totals: 9 9       Birth Information: 27 y o  female MRN: [id number] Unit/Bed#: -01 Estimated Date of Delivery: 22  Birthweight: No birth weight on file  Gestational Age: <None> Delivery Type: , Low Transverse    IMPORTANT INFORMATION:  Please contact the [facility] directly with any questions or concerns regarding this request  Department voicemails are confidential     Send requests for admission clinical reviews, concurrent reviews, approvals, and administrative denials due to lack of clinical to fax [phone]

## 2022-04-13 LAB — PLACENTA IN STORAGE: NORMAL

## 2022-04-14 ENCOUNTER — POSTPARTUM VISIT (OUTPATIENT)
Dept: OBGYN CLINIC | Facility: CLINIC | Age: 32
End: 2022-04-14

## 2022-04-14 ENCOUNTER — TELEPHONE (OUTPATIENT)
Dept: OBGYN CLINIC | Facility: CLINIC | Age: 32
End: 2022-04-14

## 2022-04-14 VITALS
HEIGHT: 64 IN | BODY MASS INDEX: 25.78 KG/M2 | HEART RATE: 93 BPM | WEIGHT: 151 LBS | DIASTOLIC BLOOD PRESSURE: 81 MMHG | SYSTOLIC BLOOD PRESSURE: 126 MMHG

## 2022-04-14 DIAGNOSIS — N30.00 ACUTE CYSTITIS WITHOUT HEMATURIA: Primary | ICD-10-CM

## 2022-04-14 PROCEDURE — 99213 OFFICE O/P EST LOW 20 MIN: CPT | Performed by: OBSTETRICS & GYNECOLOGY

## 2022-04-14 RX ORDER — NITROFURANTOIN 25; 75 MG/1; MG/1
100 CAPSULE ORAL 2 TIMES DAILY
Qty: 14 CAPSULE | Refills: 0 | Status: SHIPPED | OUTPATIENT
Start: 2022-04-14

## 2022-04-14 NOTE — TELEPHONE ENCOUNTER
Transition of Care Post Partum phone call: Congratulations  Date of delivery: 2022  Weeks gestation: full term 39 weeks  Type of delivery:  section  Sex of child: male  Name of child:   Birth weight: 3997 gm   8lbs 13ounces  Perineum laceration: No   How are you feeling: in pain with headaches  Vaginal bleeding status: spotting, moderate  Urinary status: none  Bowel movement: Yes  Incision status: Yes covered  Pain control: ibuprofen (OTC)  [place] feeding: both breast and bottle   Any baby blues: No  Scheduled for follow-up appointment: 2022    Patient due for incision check and also c/o headaches and back pain  appt for today provided

## 2022-04-18 NOTE — PROGRESS NOTES
[address] 27 y o  SUBJECTIVE    CC:  "I had "    HPI: Noah Mueller is a 27 y o  [id number] female presenting today for acute office visit s/p  day 7th  Overall feeling sore but able to ambulate without help, spontaneously urinate, has regular BM  PO pain medications are well working  No bleeding or oozing from incision  Decline urinary pain, bleeding  The following portions of the patient's history were reviewed and updated as appropriate: allergies, current medications, past family history, past medical history, past social history, past surgical history and problem list         ROS  General: negative for chills or fever   Respiratory: no cough, shortness of breath, or wheezing  Cardiovascular: no chest pain or dyspnea on exertion  Gastrointestinal: no abdominal pain, change in bowel habits, or black or bloody stools  Urinary: no urinary symptoms  Genitourinary:   Neurological: no TIA or stroke symptoms      OBJECTIVE  Vitals:    22 1410   BP: 126/81   BP Location: Left arm   Patient Position: Sitting   Cuff Size: Adult   Pulse: 93   Weight: 68 5 kg (151 lb)   Height: 5' 4" (1 626 m)       General appearance: alert and oriented, in no acute distress  Abdomen: soft, non-tender; bowel sounds normal; no masses,  no organomegaly and Incision C/D/I      Lab Results:   No visits with results within 1 Day(s) from this visit     Latest known visit with results is:   Admission on 2022, Discharged on 2022   Component Date Value    ABO Grouping 2022 A     Rh Factor 2022 Positive     Antibody Screen 2022 Negative     Specimen Expiration Date 2022 99701515     WBC 2022 8 35     RBC 2022 3 90     Hemoglobin 2022 11 0*    Hematocrit 2022 33 4*    MCV 2022 86     MCH 2022 28 2     MCHC 2022 32 9     RDW 2022 15 8*    MPV 2022 11 7     Platelets  129*    nRBC 2022 0     Neutrophils Relative 2022 67     Immat GRANS % 2022 1     Lymphocytes Relative 2022 21     Monocytes Relative 2022 10     Eosinophils Relative 2022 1     Basophils Relative 2022 0     Neutrophils Absolute 2022 5 58     Immature Grans Absolute 2022 0 06     Lymphocytes Absolute 2022 1 78     Monocytes Absolute 2022 0 83     Eosinophils Absolute 2022 0 07     Basophils Absolute 2022 0 03     RPR 2022 Non-Reactive     ABO Grouping 2022 A     Rh Factor 2022 Positive     Rapid HIV 1 AND 2 2022 Non-Reactive     HIV-1 P24 Ag Screen 2022 Non-Reactive     pH, Cord Igor 2022 7 373     pCO2, Cord Igor 2022 39 8     pO2, Cord Igor 2022 31 8     HCO3, Cord Igor 2022 22 6     Base Exc, Cord Igor 2022 -2 3*    O2 Cont, Cord Igor 2022 17 3     O2 HGB,VENOUS CORD 2022 76 0     pH, Cord Art 2022 7 319     pCO2, Cord Art 2022 50 7     pO2, Cord Art 2022 18 5     HCO3, Cord Art 2022 25 5     Base Exc, Cord Art 2022 -1 4*    O2 Content, Cord Art 2022 8 9     O2 Hgb, Arterial Cord 2022 41 0     PLACENTA IN STORAGE 2022 Placenta Discarded     WBC 2022 12 44*    RBC 2022 2 70*    Hemoglobin 2022 7 8*    Hematocrit 2022 23 2*    MCV 2022 86     MCH 2022 28 1     MCHC 2022 32 8     RDW 2022 15 4*    Platelets  129*    MPV 2022 11 7               ASSESSMENT&PLAN  S/P repeat low transverse   - S/p  section with B/L salpingectomy    - Incision C/D/I  - Pain well controlled with oral pain medications  - Spontaneously urinate, able to tolerate food, regular bowel movements  - [place] Depression score 4/14  - She has good support, she is presenting with FOB and baby today, Breast and bottle feeding        Greater than 50% of total time was spent with the patient and / or family counseling and / or coordination of care  All questions were answered &  expressed understanding      Patient was seen and discussed with Dr Teo Arellano MD  OBGYN PGY-4  4/18/2022

## 2022-04-18 NOTE — ASSESSMENT & PLAN NOTE
- S/p  section with B/L salpingectomy    - Incision C/D/I  - Pain well controlled with oral pain medications  - Spontaneously urinate, able to tolerate food, regular bowel movements  - [place] Depression score 4/14  - She has good support, she is presenting with FOB and baby today, Breast and bottle feeding

## 2022-04-22 ENCOUNTER — APPOINTMENT (EMERGENCY)
Dept: CT IMAGING | Facility: HOSPITAL | Age: 32
End: 2022-04-22
Payer: COMMERCIAL

## 2022-04-22 ENCOUNTER — NURSE TRIAGE (OUTPATIENT)
Dept: OTHER | Facility: OTHER | Age: 32
End: 2022-04-22

## 2022-04-22 ENCOUNTER — HOSPITAL ENCOUNTER (EMERGENCY)
Facility: HOSPITAL | Age: 32
Discharge: HOME | End: 2022-04-22
Attending: EMERGENCY MEDICINE | Admitting: EMERGENCY MEDICINE
Payer: COMMERCIAL

## 2022-04-22 VITALS
OXYGEN SATURATION: 98 % | RESPIRATION RATE: 18 BRPM | DIASTOLIC BLOOD PRESSURE: 61 MMHG | HEART RATE: 83 BPM | TEMPERATURE: 98 F | SYSTOLIC BLOOD PRESSURE: 131 MMHG | BODY MASS INDEX: 24.55 KG/M2 | WEIGHT: 143 LBS

## 2022-04-22 DIAGNOSIS — N39.0 UTI (URINARY TRACT INFECTION): Primary | ICD-10-CM

## 2022-04-22 DIAGNOSIS — N80.9 ENDOMETRIOMA: ICD-10-CM

## 2022-04-22 LAB
ABO GROUP BLD: NORMAL
ALBUMIN SERPL BCP-MCNC: 3.2 G/DL (ref 3.5–5)
ALP SERPL-CCNC: 102 U/L (ref 46–116)
ALT SERPL W P-5'-P-CCNC: 26 U/L (ref 12–78)
ANION GAP SERPL CALCULATED.3IONS-SCNC: 9 MMOL/L (ref 4–13)
AST SERPL W P-5'-P-CCNC: 18 U/L (ref 5–45)
BACTERIA UR QL AUTO: ABNORMAL /HPF
BASOPHILS # BLD AUTO: 0.07 THOUSANDS/ΜL (ref 0–0.1)
BASOPHILS NFR BLD AUTO: 1 % (ref 0–1)
BILIRUB SERPL-MCNC: 0.23 MG/DL (ref 0.2–1)
BILIRUB UR QL STRIP: NEGATIVE
BLD GP AB SCN SERPL QL: NEGATIVE
BUN SERPL-MCNC: 11 MG/DL (ref 5–25)
CALCIUM ALBUM COR SERPL-MCNC: 8.9 MG/DL (ref 8.3–10.1)
CALCIUM SERPL-MCNC: 8.3 MG/DL (ref 8.3–10.1)
CHLORIDE SERPL-SCNC: 105 MMOL/L (ref 100–108)
CLARITY UR: CLEAR
CO2 SERPL-SCNC: 26 MMOL/L (ref 21–32)
COLOR UR: YELLOW
CREAT SERPL-MCNC: 0.64 MG/DL (ref 0.6–1.3)
EOSINOPHIL # BLD AUTO: 0.38 THOUSAND/ΜL (ref 0–0.61)
EOSINOPHIL NFR BLD AUTO: 5 % (ref 0–6)
ERYTHROCYTE [DISTWIDTH] IN BLOOD BY AUTOMATED COUNT: 14.1 % (ref 11.6–15.1)
EXT PREG TEST URINE: NEGATIVE
EXT. CONTROL ED NAV: NORMAL
GFR SERPL CREATININE-BSD FRML MDRD: 116 ML/MIN/1.73SQ M
GLUCOSE SERPL-MCNC: 91 MG/DL (ref 65–140)
GLUCOSE UR STRIP-MCNC: NEGATIVE MG/DL
HCT VFR BLD AUTO: 32.6 % (ref 34.8–46.1)
HGB BLD-MCNC: 10.3 G/DL (ref 11.5–15.4)
HGB UR QL STRIP.AUTO: ABNORMAL
IMM GRANULOCYTES # BLD AUTO: 0.03 THOUSAND/UL (ref 0–0.2)
IMM GRANULOCYTES NFR BLD AUTO: 0 % (ref 0–2)
KETONES UR STRIP-MCNC: NEGATIVE MG/DL
LEUKOCYTE ESTERASE UR QL STRIP: ABNORMAL
LYMPHOCYTES # BLD AUTO: 1.97 THOUSANDS/ΜL (ref 0.6–4.47)
LYMPHOCYTES NFR BLD AUTO: 27 % (ref 14–44)
MCH RBC QN AUTO: 27.1 PG (ref 26.8–34.3)
MCHC RBC AUTO-ENTMCNC: 31.6 G/DL (ref 31.4–37.4)
MCV RBC AUTO: 86 FL (ref 82–98)
MONOCYTES # BLD AUTO: 0.45 THOUSAND/ΜL (ref 0.17–1.22)
MONOCYTES NFR BLD AUTO: 6 % (ref 4–12)
NEUTROPHILS # BLD AUTO: 4.28 THOUSANDS/ΜL (ref 1.85–7.62)
NEUTS SEG NFR BLD AUTO: 61 % (ref 43–75)
NITRITE UR QL STRIP: NEGATIVE
NON-SQ EPI CELLS URNS QL MICRO: ABNORMAL /HPF
NRBC BLD AUTO-RTO: 0 /100 WBCS
PH UR STRIP.AUTO: 6.5 [PH]
PLATELET # BLD AUTO: 319 THOUSANDS/UL (ref 149–390)
PMV BLD AUTO: 10.4 FL (ref 8.9–12.7)
POTASSIUM SERPL-SCNC: 4.2 MMOL/L (ref 3.5–5.3)
PROT SERPL-MCNC: 6.7 G/DL (ref 6.4–8.2)
PROT UR STRIP-MCNC: ABNORMAL MG/DL
RBC # BLD AUTO: 3.8 MILLION/UL (ref 3.81–5.12)
RBC #/AREA URNS AUTO: ABNORMAL /HPF
RH BLD: POSITIVE
SODIUM SERPL-SCNC: 140 MMOL/L (ref 136–145)
SP GR UR STRIP.AUTO: <=1.005 (ref 1–1.03)
SPECIMEN EXPIRATION DATE: NORMAL
UROBILINOGEN UR QL STRIP.AUTO: 0.2 E.U./DL
WBC # BLD AUTO: 7.18 THOUSAND/UL (ref 4.31–10.16)
WBC #/AREA URNS AUTO: ABNORMAL /HPF

## 2022-04-22 PROCEDURE — G1004 CDSM NDSC: HCPCS

## 2022-04-22 PROCEDURE — 36415 COLL VENOUS BLD VENIPUNCTURE: CPT

## 2022-04-22 PROCEDURE — 86901 BLOOD TYPING SEROLOGIC RH(D): CPT

## 2022-04-22 PROCEDURE — 86850 RBC ANTIBODY SCREEN: CPT

## 2022-04-22 PROCEDURE — 99284 EMERGENCY DEPT VISIT MOD MDM: CPT | Performed by: EMERGENCY MEDICINE

## 2022-04-22 PROCEDURE — 81025 URINE PREGNANCY TEST: CPT

## 2022-04-22 PROCEDURE — 99284 EMERGENCY DEPT VISIT MOD MDM: CPT

## 2022-04-22 PROCEDURE — 85025 COMPLETE CBC W/AUTO DIFF WBC: CPT

## 2022-04-22 PROCEDURE — 96374 THER/PROPH/DIAG INJ IV PUSH: CPT

## 2022-04-22 PROCEDURE — 74177 CT ABD & PELVIS W/CONTRAST: CPT

## 2022-04-22 PROCEDURE — 81001 URINALYSIS AUTO W/SCOPE: CPT

## 2022-04-22 PROCEDURE — 96376 TX/PRO/DX INJ SAME DRUG ADON: CPT

## 2022-04-22 PROCEDURE — 99243 OFF/OP CNSLTJ NEW/EST LOW 30: CPT | Performed by: OBSTETRICS & GYNECOLOGY

## 2022-04-22 PROCEDURE — 80053 COMPREHEN METABOLIC PANEL: CPT

## 2022-04-22 PROCEDURE — 86900 BLOOD TYPING SEROLOGIC ABO: CPT

## 2022-04-22 RX ORDER — CEPHALEXIN 500 MG/1
500 CAPSULE ORAL EVERY 12 HOURS SCHEDULED
Qty: 14 CAPSULE | Refills: 0 | Status: SHIPPED | OUTPATIENT
Start: 2022-04-22 | End: 2022-04-29

## 2022-04-22 RX ORDER — KETOROLAC TROMETHAMINE 30 MG/ML
15 INJECTION, SOLUTION INTRAMUSCULAR; INTRAVENOUS ONCE
Status: COMPLETED | OUTPATIENT
Start: 2022-04-22 | End: 2022-04-22

## 2022-04-22 RX ORDER — KETOROLAC TROMETHAMINE 30 MG/ML
15 INJECTION, SOLUTION INTRAMUSCULAR; INTRAVENOUS ONCE
Status: DISCONTINUED | OUTPATIENT
Start: 2022-04-22 | End: 2022-04-22

## 2022-04-22 RX ORDER — ACETAMINOPHEN 325 MG/1
650 TABLET ORAL ONCE
Status: COMPLETED | OUTPATIENT
Start: 2022-04-22 | End: 2022-04-22

## 2022-04-22 RX ORDER — CEPHALEXIN 250 MG/1
500 CAPSULE ORAL ONCE
Status: COMPLETED | OUTPATIENT
Start: 2022-04-22 | End: 2022-04-22

## 2022-04-22 RX ADMIN — IOHEXOL 100 ML: 350 INJECTION, SOLUTION INTRAVENOUS at 19:19

## 2022-04-22 RX ADMIN — CEPHALEXIN 500 MG: 250 CAPSULE ORAL at 20:35

## 2022-04-22 RX ADMIN — KETOROLAC TROMETHAMINE 15 MG: 30 INJECTION, SOLUTION INTRAMUSCULAR at 17:43

## 2022-04-22 RX ADMIN — ACETAMINOPHEN 650 MG: 325 TABLET ORAL at 17:42

## 2022-04-22 RX ADMIN — KETOROLAC TROMETHAMINE 15 MG: 30 INJECTION, SOLUTION INTRAMUSCULAR at 20:34

## 2022-04-22 NOTE — TELEPHONE ENCOUNTER
Reason for Disposition   [1] Caller has URGENT question AND [2] triager unable to answer question    Answer Assessment - Initial Assessment Questions  1  SYMPTOM: "What's the main symptom you're concerned about?" (e g , pain, fever, vomiting)      Vaginal bleeding    2  ONSET: "When did S/S  start?"      Today    3  DATE of : "When was  performed?"       15 days ago    4  ANESTHESIA: "What type of anesthesia did you have? (e g , general, spinal, epidural, local)      Epidural    5  PAIN: "Is there any pain?" If Yes, ask: "How bad is it?"  (Scale 1-10; or mild, moderate, severe)      Denies    6  FEVER: "Do you have a fever?" If Yes, ask: "What is your temperature, how was it measured, and when did it start?"      Denies    7  VOMITING: "Is there any vomiting?" If yes, ask: "How many times?"      Denies    8  BLEEDING: "Is there any bleeding?" If Yes, ask: "How much?" and "Where?"     Yes, vaginal bleeding    9   OTHER SYMPTOMS: "Do you have any other symptoms?" (e g , drainage from wound, painful urination, constipation)  lightheaded    Protocols used: POSTPARTUM -  [facility]

## 2022-04-22 NOTE — TELEPHONE ENCOUNTER
Regarding: Had  15 days ago now bleeding   ----- Message from Anderson Hines sent at 2022  3:12 PM EDT -----  '' I had a  15 days ago now I'm bleeding concern ''

## 2022-04-22 NOTE — ED PROVIDER NOTES
History  Chief Complaint   Patient presents with    Vaginal Bleeding     Pt to ED with c/o vaginal bleeding and abdominal pain described as the same as contraction, pt states changed pad 3 times in last hour      27 y o  [id number] with recent low transverse Caesarean section on 04/07/2022 presents for evaluation of abdominal pain, vaginal bleeding for the past 1 5 hours  Patient reports she has soaked through about 3 heavy duty pads since onset of vaginal bleeding and describes it as malodorous dark, old blood  Patient denies recent fevers/chills, N/V/D, chest pain, vision changes, headache or any other symptoms  She reports she has been following OBGYN with postpartum visits as instructed with no difficulty  No issues breast feeding  Patient reports dysuria  No other concerns  Prior to Admission Medications   Prescriptions Last Dose Informant Patient Reported? Taking?    Prenatal Vit-Fe Fumarate-FA (prenatal vitamin) 28-0 8 mg   No No   Sig: Take 1 tablet by mouth daily   acetaminophen (TYLENOL) 325 mg tablet   No No   Sig: Take 2 tablets (650 mg total) by mouth every 6 (six) hours as needed for mild pain or moderate pain   cyclobenzaprine (FLEXERIL) 10 mg tablet   No No   Sig: Take 1 tablet (10 mg total) by mouth 3 (three) times a day as needed for muscle spasms for up to 3 days   docusate sodium (COLACE) 100 mg capsule   No No   Sig: Take 1 capsule (100 mg total) by mouth 2 (two) times a day   ferrous sulfate 324 (65 Fe) mg  Self No No   Sig: Take 1 tablet (324 mg total) by mouth 2 (two) times a day before meals   folic acid (FOLVITE) 655 mcg tablet  Self No No   Sig: Take 1 tablet (400 mcg total) by mouth daily   ibuprofen (MOTRIN) 600 mg tablet   No No   Sig: Take 1 tablet (600 mg total) by mouth every 6 (six) hours   nitrofurantoin (MACROBID) 100 mg capsule   No No   Sig: Take 1 capsule (100 mg total) by mouth 2 (two) times a day      Facility-Administered Medications: None       Past Medical History:   Diagnosis Date    Hypertension     hx of pre eclampsia x 2    Migraine     blurry vision    Migraines     Varicella     vaccinated per pt        Past Surgical History:   Procedure Laterality Date     SECTION      COLPOSCOPY      [place]  DELIVERY ONLY N/A 2022    Procedure:  SECTION () REPEAT;  Surgeon: Morgan Mcneil MD;  [facility]: [facility] ;  Service: Obstetrics    [place] CONIZATION CERVIX,KNIFE/LASER      [place] LIGATION,FALLOPIAN TUBE W/ Bilateral 2022    Procedure: LIGATION/COAGULATION TUBAL;  Surgeon: Morgan Mcneil MD;  Location: [facility] ;  Service: Obstetrics       Family History   Problem Relation Age of Onset    No Known Problems Mother     Heart disease Father     Hypertension Father     No Known Problems Sister     No Known Problems Brother     Asthma Daughter     Hypertension Paternal Grandmother     Cancer Paternal Grandfather     No Known Problems Daughter      I have reviewed and agree with the history as documented  E-Cigarette/Vaping    E-Cigarette Use Never User      E-Cigarette/Vaping Substances    Nicotine No     THC No     CBD No     Flavoring No     Other No     Unknown No      Social History     Tobacco Use    Smoking status: Never Smoker    Smokeless tobacco: Never Used   Vaping Use    Vaping Use: Never used   Substance Use Topics    Alcohol use: Not Currently    Drug use: Never        Review of Systems   Constitutional: Negative  Negative for chills and fever  HENT: Negative  Eyes: Negative  Respiratory: Negative  Negative for cough and shortness of breath  Cardiovascular: Negative  Negative for chest pain and palpitations  Gastrointestinal: Positive for abdominal pain  Negative for nausea and vomiting  Endocrine: Negative  Genitourinary: Positive for dysuria and vaginal bleeding  Musculoskeletal: Negative  Skin: Negative  Negative for rash  Allergic/Immunologic: Negative  Neurological: Negative  Negative for dizziness, syncope, weakness, numbness and headaches  Hematological: Negative  Psychiatric/Behavioral: Negative  All other systems reviewed and are negative  Physical Exam  ED Triage Vitals [04/22/22 1634]   Temperature Pulse Respirations Blood Pressure SpO2   98 °F (36 7 °C) 83 18 131/61 98 %      Temp Source Heart Rate Source Patient Position - Orthostatic VS BP Location FiO2 (%)   Oral Monitor Sitting Left arm --      Pain Score       --             Orthostatic Vital Signs  Vitals:    04/22/22 1634   BP: 131/61   Pulse: 83   Patient Position - Orthostatic VS: Sitting       Physical Exam  Vitals and nursing note reviewed  Constitutional:       General: She is not in acute distress  Appearance: She is well-developed  HENT:      Head: Normocephalic and atraumatic  Right Ear: External ear normal       Nose: Nose normal       Mouth/Throat:      Mouth: Mucous membranes are moist    Eyes:      Extraocular Movements: Extraocular movements intact  Conjunctiva/sclera: Conjunctivae normal    Cardiovascular:      Rate and Rhythm: Normal rate and regular rhythm  Pulses: Normal pulses  Heart sounds: Normal heart sounds  No murmur heard  Pulmonary:      Effort: Pulmonary effort is normal  No respiratory distress  Breath sounds: Normal breath sounds  Abdominal:      Palpations: Abdomen is soft  Tenderness: There is abdominal tenderness  There is guarding  There is no right CVA tenderness, left CVA tenderness or rebound  Comments: Firm uterus about 3 cm below umbilicus  Voluntary guarding with generalized tenderness below the umbilicus  Low transverse Caesarean section postsurgical wound is clean dry and intact   Musculoskeletal:         General: No swelling or tenderness  Normal range of motion  Cervical back: Normal range of motion and neck supple  Right lower leg: No edema  Left lower leg: No edema  Skin:     General: Skin is warm and dry  Capillary Refill: Capillary refill takes less than 2 seconds  Neurological:      Mental Status: She is alert and oriented to person, place, and time  Mental status is at baseline     Psychiatric:         Mood and Affect: Mood normal          Behavior: Behavior normal          ED Medications  Medications   acetaminophen (TYLENOL) tablet 650 mg (650 mg Oral Given 4/22/22 1742)   ketorolac (TORADOL) injection 15 mg (15 mg Intravenous Given 4/22/22 1743)   iohexol (OMNIPAQUE) 350 MG/ML injection (SINGLE-DOSE) 100 mL (100 mL Intravenous Given 4/22/22 1919)   cephalexin (KEFLEX) capsule 500 mg (500 mg Oral Given 4/22/22 2035)   ketorolac (TORADOL) injection 15 mg (15 mg Intravenous Given 4/22/22 2034)       Diagnostic Studies  Results Reviewed     Procedure Component Value Units Date/Time    UA w Reflex to Microscopic w Reflex to Culture [[id number]]  (Abnormal) Collected: 04/22/22 1914    Lab Status: Final result Specimen: Urine, Clean Catch Updated: 04/22/22 1940     Color, UA Yellow     Clarity, UA Clear     Specific Gravity, UA <=1 005     pH, UA 6 5     Leukocytes, UA Trace     Nitrite, UA Negative     Protein, UA Trace mg/dl      Glucose, UA Negative mg/dl      Ketones, UA Negative mg/dl      Urobilinogen, UA 0 2 E U /dl      Bilirubin, UA Negative     Blood, UA Large    Urine Microscopic [[id number]]  (Abnormal) Collected: 04/22/22 1914    Lab Status: Final result Specimen: Urine, Clean Catch Updated: 04/22/22 1940     RBC, UA 20-30 /hpf      WBC, UA 1-2 /hpf      Epithelial Cells Occasional /hpf      Bacteria, UA Innumerable /hpf     POCT pregnancy, urine [[id number]]  (Normal) Resulted: 04/22/22 1918    Lab Status: Final result Updated: 04/22/22 1918     EXT PREG TEST UR (Ref: Negative) negative     Control valid    Comprehensive metabolic panel [[id number]]  (Abnormal) Collected: 04/22/22 1740    Lab Status: Final result Specimen: Blood from Arm, Right Updated: 04/22/22 1838     Sodium 140 mmol/L      Potassium 4 2 mmol/L      Chloride 105 mmol/L      CO2 26 mmol/L      ANION GAP 9 mmol/L      BUN 11 mg/dL      Creatinine 0 64 mg/dL      Glucose 91 mg/dL      Calcium 8 3 mg/dL      Corrected Calcium 8 9 mg/dL      AST 18 U/L      ALT 26 U/L      Alkaline Phosphatase 102 U/L      Total Protein 6 7 g/dL      Albumin 3 2 g/dL      Total Bilirubin 0 23 mg/dL      eGFR 116 ml/min/1 73sq m     Narrative:      [place] guidelines for Chronic Kidney Disease (CKD):     Stage 1 with normal or high GFR (GFR > 90 mL/min/1 73 square meters)    Stage 2 Mild CKD (GFR = 60-89 mL/min/1 73 square meters)    Stage 3A Moderate CKD (GFR = 45-59 mL/min/1 73 square meters)    Stage 3B Moderate CKD (GFR = 30-44 mL/min/1 73 square meters)    Stage 4 Severe CKD (GFR = 15-29 mL/min/1 73 square meters)    Stage 5 End Stage CKD (GFR <15 mL/min/1 73 square meters)  Note: GFR calculation is accurate only with a steady state creatinine    CBC and differential [[id number]]  (Abnormal) Collected: 04/22/22 1740    Lab Status: Final result Specimen: Blood from Arm, Right Updated: 04/22/22 1801     WBC 7 18 Thousand/uL      RBC 3 80 Million/uL      Hemoglobin 10 3 g/dL      Hematocrit 32 6 %      MCV 86 fL      MCH 27 1 pg      MCHC 31 6 g/dL      RDW 14 1 %      MPV 10 4 fL      Platelets 057 Thousands/uL      nRBC 0 /100 WBCs      Neutrophils Relative 61 %      Immat GRANS % 0 %      Lymphocytes Relative 27 %      Monocytes Relative 6 %      Eosinophils Relative 5 %      Basophils Relative 1 %      Neutrophils Absolute 4 28 Thousands/µL      Immature Grans Absolute 0 03 Thousand/uL      Lymphocytes Absolute 1 97 Thousands/µL      Monocytes Absolute 0 45 Thousand/µL      Eosinophils Absolute 0 38 Thousand/µL      Basophils Absolute 0 07 Thousands/µL                  CT abdomen pelvis with contrast   Final Result by Ivy Fernandez DO (04/22 2036)      History as above  Along the anterior lower uterine segment the myometrium is indistinguishable  Hematoma seen anterior to the anterior uterus blends indistinguishably with blood within the endometrial canal (series 602 image 82)  The possibility of    focal uterine dehiscence/perforation cannot be excluded  Gynecologic consultation and further follow-up is advised  Endometrial cavity is diffusely distended with complex fluid suggesting hemorrhage with some hemorrhagic fluid seen along the endocervical canal as well as the  scar  Small amount of seroma/hematoma in the space of Retzius as well as small volume simple and hemorrhagic free pelvic fluid  No evidence of abscess  Otherwise, no acute intra-abdominal abnormality  The study was marked in [facility] for immediate notification  Workstation performed: [id number]               Procedures  Procedures      ED Course  ED Course as of 22 2351      1810 Reports pain has improved after tylenol and toradol    Pt and  reported wanting to leave AMA to return to  and due to "waiting a long time without things being done " I extensively explained that this was not the case, many things are in the works for workup including pending CMP prior to CT abd/pelvis with contrast; ob-gyn already contacted who has been advising on workup with plans for pelvic exam here in ED  SBIRT 22yo+      Most Recent Value   SBIRT (24 yo +)    In order to provide better care to our patients, we are screening all of our patients for alcohol and drug use  Would it be okay to ask you these screening questions?  Unable to answer at this time Filed at: 2022 1907                MDM  Number of Diagnoses or Management Options  Endometrioma  UTI (urinary tract infection)  Diagnosis management comments: 27 y o  [id number] with recent low transverse Caesarean section on 2022 presents for evaluation of abdominal pain, vaginal bleeding for the past 1 5 hours  Patient remained stable throughout ED course  Patient was given Tylenol and x2 doses of 50 mg Toradol IV with improvement of pain  CBC reflecting improving hemoglobin/hematocrit since multi CS on 04/07/2022  CMP unremarkable  UA showing UTI for which patient was given 1st dose of Keflex with prescription for home x7 days  Due to abdominal tenderness status post multi CS with vaginal bleeding, CT abdomen/pelvis with contrast done showing "Hematoma seen anterior to the anterior uterus blends indistinguishably with blood within the endometrial canal   The possibility of focal uterine dehiscence/perforation cannot be excluded " OBGYN service consulted and patient was evaluated, formal recommendation was DC to home with DX endometrioma  I was unable to discuss definitive findings overall with patient as she and  left prior to final discussion of all results  However I was able to re-evaluated patient several times during her ED course and patient remained stable with improvement of pain overall  No other concerns         Amount and/or Complexity of Data Reviewed  Clinical lab tests: ordered and reviewed  Tests in the radiology section of CPT®: ordered and reviewed  Tests in the medicine section of CPT®: reviewed and ordered  Discuss the patient with other providers: yes    Risk of Complications, Morbidity, and/or Mortality  Presenting problems: moderate  Diagnostic procedures: moderate  Management options: moderate    Patient Progress  Patient progress: improved      Disposition  Final diagnoses:   UTI (urinary tract infection)   Endometrioma     Time reflects when diagnosis was documented in both MDM as applicable and the Disposition within this note     Time User Action Codes Description Comment    4/22/2022  8:20 PM Matilda Tong Add [N39 0] UTI (urinary tract infection)     4/22/2022  8:20 PM Matilda Tong Add [N80 9] Endometrioma       ED Disposition ED Disposition Condition Date/Time Comment    Discharge Stable Fri Apr 22, 2022  8:48  [address] discharge to home/self care              Follow-up Information     Follow up With Specialties Details Why Contact Info Additional [address] Emergency Department Emergency Medicine Go to  If symptoms worsen [address] [facility] [address] [facility] Emergency Department, [address], [place], [place], [address]          Discharge Medication List as of 4/22/2022  8:49 PM      START taking these medications    Details   cephalexin (KEFLEX) 500 mg capsule Take 1 capsule (500 mg total) by mouth every 12 (twelve) hours for 7 days, Starting Fri 4/22/2022, Until Fri 4/29/2022, Normal         CONTINUE these medications which have NOT CHANGED    Details   acetaminophen (TYLENOL) 325 mg tablet Take 2 tablets (650 mg total) by mouth every 6 (six) hours as needed for mild pain or moderate pain, Starting Tue 3/8/2022, Normal      cyclobenzaprine (FLEXERIL) 10 mg tablet Take 1 tablet (10 mg total) by mouth 3 (three) times a day as needed for muscle spasms for up to 3 days, Starting Wed 3/30/2022, Until Sat 4/2/2022 at 2359, Normal      docusate sodium (COLACE) 100 mg capsule Take 1 capsule (100 mg total) by mouth 2 (two) times a day, Starting Tue 3/8/2022, Normal      ferrous sulfate 324 (65 Fe) mg Take 1 tablet (324 mg total) by mouth 2 (two) times a day before meals, Starting Thu 50/4/1269, Normal      folic acid (FOLVITE) 414 mcg tablet Take 1 tablet (400 mcg total) by mouth daily, Starting Tue 9/7/2021, Normal      ibuprofen (MOTRIN) 600 mg tablet Take 1 tablet (600 mg total) by mouth every 6 (six) hours, Starting Sat 4/9/2022, Normal      nitrofurantoin (MACROBID) 100 mg capsule Take 1 capsule (100 mg total) by mouth 2 (two) times a day, Starting Thu 4/14/2022, Normal      Prenatal Vit-Fe Fumarate-FA (prenatal vitamin) 28-0 8 mg Take 1 tablet by mouth daily, Starting Tue 3/8/2022, Until Thu 4/7/2022, Normal           No discharge procedures on file  PDMP Review       Value Time User    PDMP Reviewed  Yes 4/9/2022 11:26 AM Isa Harvey MD           ED Provider  Attending physically available and evaluated Nicole Jones I managed the patient along with the ED Attending      Electronically Signed by         Tucker Black MD  04/22/22 4441

## 2022-04-22 NOTE — ED ATTENDING ATTESTATION
2022  IAdri MD, saw and evaluated the patient  I have discussed the patient with the resident/non-physician practitioner and agree with the resident's/non-physician practitioner's findings, Plan of Care, and MDM as documented in the resident's/non-physician practitioner's note, except where noted  All available labs and Radiology studies were reviewed  I was present for key portions of any procedure(s) performed by the resident/non-physician practitioner and I was immediately available to provide assistance  At this point I agree with the current assessment done in the Emergency Department  I have conducted an independent evaluation of this patient a history and physical is as follows:    S:  Chief Complaint   Patient presents with    Vaginal Bleeding     Pt to ED with c/o vaginal bleeding and abdominal pain described as the same as contraction, pt states changed pad 3 times in last hour      Neema is a 27 y o  female who presents with pelvic pain and vaginal bleeding that started 1 5 hours ago  She is 2 weeks s/p  section  She reports she has soaked through 3 pads over the past 90 minutes  The blood is described as dark  O:  ED Triage Vitals [22 1634]   Temperature Pulse Respirations Blood Pressure SpO2   98 °F (36 7 °C) 83 18 131/61 98 %      Temp Source Heart Rate Source Patient Position - Orthostatic VS BP Location FiO2 (%)   Oral Monitor Sitting Left arm --      Pain Score       --         Physical Exam  Vitals and nursing note reviewed  Constitutional:       General: She is in acute distress  Appearance: She is well-developed  HENT:      Head: Normocephalic and atraumatic  Eyes:      Pupils: Pupils are equal, round, and reactive to light  Neck:      Vascular: No JVD  Cardiovascular:      Rate and Rhythm: Normal rate and regular rhythm  Heart sounds: Normal heart sounds  No murmur heard  No friction rub  No gallop      Pulmonary: Effort: Pulmonary effort is normal  No respiratory distress  Breath sounds: Normal breath sounds  No wheezing or rales  Chest:      Chest wall: No tenderness  Abdominal:      General: There is no distension  Palpations: There is no mass  Tenderness: There is abdominal tenderness (lower abdomen/pelvis)  There is no guarding  Musculoskeletal:         General: No tenderness  Normal range of motion  Cervical back: Normal range of motion  Skin:     General: Skin is warm and dry  Neurological:      General: No focal deficit present  Mental Status: She is alert and oriented to person, place, and time  Psychiatric:         Behavior: Behavior normal          Thought Content:  Thought content normal          Judgment: Judgment normal            A/P:  Background: 27 y o  female presents with vaginal bleeding and pelvic pain after a  section 2 weeks ago     Differential DX includes but is not limited to: retained products, delayed post partum hemorrhage, endometritis,     Plan: cbc, metabolic panel, type and screen, consult ob    ED Course     Labs Reviewed   CBC AND DIFFERENTIAL - Abnormal       Result Value Ref Range Status    WBC 7 18  4 31 - 10 16 Thousand/uL Final    RBC 3 80 (*) 3 81 - 5 12 Million/uL Final    Hemoglobin 10 3 (*) 11 5 - 15 4 g/dL Final    Hematocrit 32 6 (*) 34 8 - 46 1 % Final    MCV 86  82 - 98 fL Final    MCH 27 1  26 8 - 34 3 pg Final    MCHC 31 6  31 4 - 37 4 g/dL Final    RDW 14 1  11 6 - 15 1 % Final    MPV 10 4  8 9 - 12 7 fL Final    Platelets 818  999 - 390 Thousands/uL Final    nRBC 0  /100 WBCs Final    Neutrophils Relative 61  43 - 75 % Final    Immat GRANS % 0  0 - 2 % Final    Lymphocytes Relative 27  14 - 44 % Final    Monocytes Relative 6  4 - 12 % Final    Eosinophils Relative 5  0 - 6 % Final    Basophils Relative 1  0 - 1 % Final    Neutrophils Absolute 4 28  1 85 - 7 62 Thousands/µL Final    Immature Grans Absolute 0 03  0 00 - 0 20 Thousand/uL Final    Lymphocytes Absolute 1 97  0 60 - 4 47 Thousands/µL Final    Monocytes Absolute 0 45  0 17 - 1 22 Thousand/µL Final    Eosinophils Absolute 0 38  0 00 - 0 61 Thousand/µL Final    Basophils Absolute 0 07  0 00 - 0 10 Thousands/µL Final   COMPREHENSIVE METABOLIC PANEL - Abnormal    Sodium 140  136 - 145 mmol/L Final    Potassium 4 2  3 5 - 5 3 mmol/L Final    Chloride 105  100 - 108 mmol/L Final    CO2 26  21 - 32 mmol/L Final    ANION GAP 9  4 - 13 mmol/L Final    BUN 11  5 - 25 mg/dL Final    Creatinine 0 64  0 60 - 1 30 mg/dL Final    Comment: Standardized to IDMS reference method    Glucose 91  65 - 140 mg/dL Final    Comment: If the patient is fasting, the ADA then defines impaired fasting glucose as > 100 mg/dL and diabetes as > or equal to 123 mg/dL  Specimen collection should occur prior to Sulfasalazine administration due to the potential for falsely depressed results  Specimen collection should occur prior to Sulfapyridine administration due to the potential for falsely elevated results  Calcium 8 3  8 3 - 10 1 mg/dL Final    Corrected Calcium 8 9  8 3 - 10 1 mg/dL Final    AST 18  5 - 45 U/L Final    Comment: Specimen collection should occur prior to Sulfasalazine administration due to the potential for falsely depressed results  ALT 26  12 - 78 U/L Final    Comment: Specimen collection should occur prior to Sulfasalazine administration due to the potential for falsely depressed results  Alkaline Phosphatase 102  46 - 116 U/L Final    Total Protein 6 7  6 4 - 8 2 g/dL Final    Albumin 3 2 (*) 3 5 - 5 0 g/dL Final    Total Bilirubin 0 23  0 20 - 1 00 mg/dL Final    Comment: Use of this assay is not recommended for patients undergoing treatment with eltrombopag due to the potential for falsely elevated results      eGFR 116  ml/min/1 73sq m Final    Narrative:     [place] guidelines for Chronic Kidney Disease (CKD):     Stage 1 with normal or high GFR (GFR > 90 mL/min/1 73 square meters)    Stage 2 Mild CKD (GFR = 60-89 mL/min/1 73 square meters)    Stage 3A Moderate CKD (GFR = 45-59 mL/min/1 73 square meters)    Stage 3B Moderate CKD (GFR = 30-44 mL/min/1 73 square meters)    Stage 4 Severe CKD (GFR = 15-29 mL/min/1 73 square meters)    Stage 5 End Stage CKD (GFR <15 mL/min/1 73 square meters)  Note: GFR calculation is accurate only with a steady state creatinine   UA W REFLEX TO MICROSCOPIC WITH REFLEX TO CULTURE - Abnormal    Color, UA Yellow   Final    Clarity, UA Clear   Final    Specific Gravity, UA <=1 005  1 003 - 1 030 Final    pH, UA 6 5  4 5, 5 0, 5 5, 6 0, 6 5, 7 0, 7 5, 8 0 Final    Leukocytes, UA Trace (*) Negative Final    Nitrite, UA Negative  Negative Final    Protein, UA Trace (*) Negative mg/dl Final    Glucose, UA Negative  Negative mg/dl Final    Ketones, UA Negative  Negative mg/dl Final    Urobilinogen, UA 0 2  0 2, 1 0 E U /dl E U /dl Final    Bilirubin, UA Negative  Negative Final    Blood, UA Large (*) Negative Final   URINE MICROSCOPIC - Abnormal    RBC, UA 20-30 (*) None Seen, 0-1, 1-2, 2-4, 0-5 /hpf Final    WBC, UA 1-2  None Seen, 0-1, 1-2, 0-5, 2-4 /hpf Final    Epithelial Cells Occasional  None Seen, Occasional /hpf Final    Bacteria, UA Innumerable (*) None Seen, Occasional /hpf Final   POCT PREGNANCY, URINE - Normal    EXT PREG TEST UR (Ref: Negative) negative   Final    Control valid   Final   TYPE AND SCREEN    ABO Grouping A   Final    Rh Factor Positive   Final    Antibody Screen Negative   Final    Specimen Expiration Date [id number]   Final       CT abdomen pelvis with contrast   Final Result      History as above  Along the anterior lower uterine segment the myometrium is indistinguishable  Hematoma seen anterior to the anterior uterus blends indistinguishably with blood within the endometrial canal (series 602 image 82)  The possibility of    focal uterine dehiscence/perforation cannot be excluded    Gynecologic consultation and further follow-up is advised  Endometrial cavity is diffusely distended with complex fluid suggesting hemorrhage with some hemorrhagic fluid seen along the endocervical canal as well as the  scar  Small amount of seroma/hematoma in the space of Retzius as well as small volume simple and hemorrhagic free pelvic fluid  No evidence of abscess  Otherwise, no acute intra-abdominal abnormality  The study was marked in [facility] for immediate notification  Workstation performed: [id number]             Critical Care Time  Procedures    Time reflects when diagnosis was documented in both MDM as applicable and the Disposition within this note     Time User Action Codes Description Comment    2022  8:20 PM Tony Watt Add [N39 0] UTI (urinary tract infection)     2022  8:20 PM Tony Watt Add [N80 9] Endometrioma       ED Disposition     ED Disposition Condition Date/Time Comment    Discharge Stable   8:48  [address] discharge to home/self care              Follow-up Information     Follow up With Specialties Details Why Contact Info Additional [address] Emergency Department Emergency Medicine Go to  If symptoms worsen [address] Emergency Department,  [address], [address], [address]

## 2022-04-22 NOTE — ED NOTES
Patient transported to [address] [place] [address], [address] [place] [address]  04/22/22 1918

## 2022-04-23 NOTE — ED NOTES
Patient walked out of ER without discharge instructions  Resident aware        Deanna Oscar, RN  04/22/22 9114

## 2022-04-23 NOTE — CONSULTS
Consultation - Gynecology  Loly Muse 27 y o  female MRN: [id number]  Unit/Bed#: FT 01 Encounter: [id number]      [address]      Chief Complaint   Patient presents with    Vaginal Bleeding     Pt to ED with c/o vaginal bleeding and abdominal pain described as the same as contraction, pt states changed pad 3 times in last hour        History of Present Illness     HPI: Loly Muse is a 27 y o  [id number] female POD 15 s/p scheduled RLTCS + BTL on 4/7/22  Patient is Kosovan-speaking  Her  is present for translation  Per op note her surgery was notable for thin lower uterine segment with a window, as well as dense adhesions between the anterior uterus and anterior abdominal wall, which were taken down  Blood loss was 682 cc  She otherwise had an uncomplicated post op course and was discharged on POD 2  Patient had first postpartum visit on 4/14/22 at which time Macrobid was prescribed for UTI  She was otherwise doing well  Today patient presents for vaginal bleeding that started earlier today  States this is the first time she has bled since surgery  She states she soaked through at least three heavy pads today with dark red bleeding  She has also noted difficulty urinating  Per chart review patient had first postpartum visit on 4/14/22 and was diagnosed with UTI at that time, and was started on antibiotics  She states she stopped this medication yesterday  For post op pain she is more recently only using Advil  She currently denies fever, chills, nausea, vomiting, headache  Notes some chest discomfort mostly with baby being on chest  Patient endorses continued pain with urination  Denies hematuria  Review of Systems   Constitutional: Negative for chills and fever  Respiratory: Negative for chest tightness and shortness of breath  Cardiovascular: Positive for chest pain (with having baby on chest, otherwise no)  Negative for palpitations     Gastrointestinal: Negative for nausea and vomiting  Genitourinary: Positive for dysuria and vaginal bleeding  Negative for hematuria and vaginal discharge  Neurological: Negative for dizziness and light-headedness  Psychiatric/Behavioral: Negative for confusion         Historical Information   Past Medical History:   Diagnosis Date    Hypertension     hx of pre eclampsia x 2    Migraine     blurry vision    Migraines     Varicella     vaccinated per pt      Past Surgical History:   Procedure Laterality Date     SECTION      COLPOSCOPY      [place]  DELIVERY ONLY N/A 2022    Procedure:  SECTION () REPEAT;  Surgeon: Luana Halsted, MD;  [facility];  Service: Obstetrics    [place] CONIZATION CERVIX,KNIFE/LASER      [place] LIGATION,FALLOPIAN TUBE W/ Bilateral 2022    Procedure: LIGATION/COAGULATION TUBAL;  Surgeon: Luana Halsted, MD;  Location: [facility];  Service: Obstetrics     OB History    Para Term  AB Living   3 3 2 1 0 3   SAB IAB Ectopic Multiple Live Births   0 0 0 0 3      # Outcome Date GA Lbr Merrick/2nd Weight Sex Delivery Anes PTL Lv   3 Term 22 39w1d  3997 g (8 lb 13 oz) M CS-LTranv Spinal N RETA   2 Term 18 38w0d   F CS-Unspec EPI  RETA      Complications: Pre-eclampsia   1  06   1588 g (3 lb 8 oz) F CS-Unspec EPI  RETA      Complications: Pre-eclampsia, Bleeding     Family History   Problem Relation Age of Onset    No Known Problems Mother     Heart disease Father     Hypertension Father     No Known Problems Sister     No Known Problems Brother     Asthma Daughter     Hypertension Paternal Grandmother     Cancer Paternal Grandfather     No Known Problems Daughter      Social History   Social History     Substance and Sexual Activity   Alcohol Use Not Currently     Social History     Substance and Sexual Activity   Drug Use Never     Social History     Tobacco Use   Smoking Status Never Smoker   Smokeless Tobacco Never Used       Meds/Allergies   No current facility-administered medications for this encounter  No Known Allergies    Objective   Vitals: Blood pressure 131/61, pulse 83, temperature 98 °F (36 7 °C), temperature source Oral, resp  rate 18, weight 64 9 kg (143 lb), last menstrual period 07/13/2021, SpO2 98 %, currently breastfeeding  Body mass index is 24 55 kg/m²  No intake or output data in the 24 hours ending 04/22/22 2132    Invasive Devices  Report    None                 Physical Exam  Vitals reviewed  Constitutional:       General: She is not in acute distress  Appearance: She is normal weight  She is not ill-appearing or diaphoretic  HENT:      Head: Normocephalic and atraumatic  Eyes:      Conjunctiva/sclera: Conjunctivae normal       Pupils: Pupils are equal, round, and reactive to light  Cardiovascular:      Rate and Rhythm: Normal rate  Pulmonary:      Effort: Pulmonary effort is normal  No respiratory distress  Abdominal:      General: There is distension (mild, postpartum state)  Palpations: Abdomen is soft  Tenderness: There is abdominal tenderness (moderate infraumbilical tenderness)  Comments: Uterine fundus felt to be firm 3-4 cm below the umbilicus  No evidence of fundal tenderness  Pfannenstiel incision c/d/i, covered in surgical glue, no evidence of erythema, induration, separation, bleeding, or purulent drainage  Genitourinary:     Comments: Large pad moderately soaked with darker red blood  No active bleeding seen  Speculum exam shows closed appearing cervix, no evidence of lesion or active vaginal bleeding  Digital vaginal exam reveals closed cervix, regular contour, no CMT  Musculoskeletal:         General: Normal range of motion  Cervical back: Normal range of motion  Skin:     General: Skin is warm and dry  Coloration: Skin is not pale  Neurological:      General: No focal deficit present        Mental Status: She is alert and oriented to person, place, and time  Mental status is at baseline  Psychiatric:         Mood and Affect: Mood normal          Behavior: Behavior normal          Thought Content:  Thought content normal          Lab Results:   Recent Results (from the past 24 hour(s))   CBC and differential    Collection Time: 04/22/22  5:40 PM   Result Value Ref Range    WBC 7 18 4 31 - 10 16 Thousand/uL    RBC 3 80 (L) 3 81 - 5 12 Million/uL    Hemoglobin 10 3 (L) 11 5 - 15 4 g/dL    Hematocrit 32 6 (L) 34 8 - 46 1 %    MCV 86 82 - 98 fL    MCH 27 1 26 8 - 34 3 pg    MCHC 31 6 31 4 - 37 4 g/dL    RDW 14 1 11 6 - 15 1 %    MPV 10 4 8 9 - 12 7 fL    Platelets 156 582 - 536 Thousands/uL    nRBC 0 /100 WBCs    Neutrophils Relative 61 43 - 75 %    Immat GRANS % 0 0 - 2 %    Lymphocytes Relative 27 14 - 44 %    Monocytes Relative 6 4 - 12 %    Eosinophils Relative 5 0 - 6 %    Basophils Relative 1 0 - 1 %    Neutrophils Absolute 4 28 1 85 - 7 62 Thousands/µL    Immature Grans Absolute 0 03 0 00 - 0 20 Thousand/uL    Lymphocytes Absolute 1 97 0 60 - 4 47 Thousands/µL    Monocytes Absolute 0 45 0 17 - 1 22 Thousand/µL    Eosinophils Absolute 0 38 0 00 - 0 61 Thousand/µL    Basophils Absolute 0 07 0 00 - 0 10 Thousands/µL   Comprehensive metabolic panel    Collection Time: 04/22/22  5:40 PM   Result Value Ref Range    Sodium 140 136 - 145 mmol/L    Potassium 4 2 3 5 - 5 3 mmol/L    Chloride 105 100 - 108 mmol/L    CO2 26 21 - 32 mmol/L    ANION GAP 9 4 - 13 mmol/L    BUN 11 5 - 25 mg/dL    Creatinine 0 64 0 60 - 1 30 mg/dL    Glucose 91 65 - 140 mg/dL    Calcium 8 3 8 3 - 10 1 mg/dL    Corrected Calcium 8 9 8 3 - 10 1 mg/dL    AST 18 5 - 45 U/L    ALT 26 12 - 78 U/L    Alkaline Phosphatase 102 46 - 116 U/L    Total Protein 6 7 6 4 - 8 2 g/dL    Albumin 3 2 (L) 3 5 - 5 0 g/dL    Total Bilirubin 0 23 0 20 - 1 00 mg/dL    eGFR 116 ml/min/1 73sq m   Type and screen    Collection Time: 04/22/22  5:40 PM   Result Value Ref Range    ABO Grouping A     Rh Factor Positive     Antibody Screen Negative     Specimen Expiration Date 2022    UA w Reflex to Microscopic w Reflex to Culture    Collection Time: 22  7:14 PM    Specimen: Urine, Clean Catch   Result Value Ref Range    Color, UA Yellow     Clarity, UA Clear     Specific Gravity, UA <=1 005 1 003 - 1 030    pH, UA 6 5 4 5, 5 0, 5 5, 6 0, 6 5, 7 0, 7 5, 8 0    Leukocytes, UA Trace (A) Negative    Nitrite, UA Negative Negative    Protein, UA Trace (A) Negative mg/dl    Glucose, UA Negative Negative mg/dl    Ketones, UA Negative Negative mg/dl    Urobilinogen, UA 0 2 0 2, 1 0 E U /dl E U /dl    Bilirubin, UA Negative Negative    Blood, UA Large (A) Negative   Urine Microscopic    Collection Time: 22  7:14 PM   Result Value Ref Range    RBC, UA 20-30 (A) None Seen, 0-1, 1-2, 2-4, 0-5 /hpf    WBC, UA 1-2 None Seen, 0-1, 1-2, 0-5, 2-4 /hpf    Epithelial Cells Occasional None Seen, Occasional /hpf    Bacteria, UA Innumerable (A) None Seen, Occasional /hpf   POCT pregnancy, urine    Collection Time: 22  7:18 PM   Result Value Ref Range    EXT PREG TEST UR (Ref: Negative) negative     Control valid        Imaging:    CT abdomen pelvis with contrast  FINDINGS:  ABDOMEN  LOWER CHEST:  No clinically significant abnormality identified in the visualized lower chest      LIVER/BILIARY TREE:  Unremarkable      GALLBLADDER:  No calcified gallstones  No pericholecystic inflammatory change      SPLEEN:  Unremarkable      PANCREAS:  Unremarkable      ADRENAL GLANDS:  Unremarkable      KIDNEYS/URETERS:  Unremarkable  No hydronephrosis      STOMACH AND BOWEL:  Unremarkable      APPENDIX:  No findings to suggest appendicitis      ABDOMINOPELVIC CAVITY:  There is a small amount of simple and complex free fluid in the pelvis  Small amount of seroma/hematoma is seen in the space of Retzius subjacent to the  scar  No rim-enhancing fluid collections to suggest abscess        No pneumoperitoneum    No lymphadenopathy      VESSELS:  Unremarkable for patient's age      PELVIS     REPRODUCTIVE ORGANS:  The endometrial cavity is diffusely distended with complex fluid suggesting hemorrhage  There is some probable hemorrhagic fluid seen along the endocervical canal as well  Some fluid along the region of the  scar  Along   the anterior lower uterine segment the myometrium is indistinguishable  Hematoma seen anterior to the anterior uterus blends indistinguishably with blood within the endometrial canal (series 602 image 82)  The possibility of focal uterine   dehiscence/perforation cannot be excluded      No discernible adnexal pathology      URINARY BLADDER:  Collapsed, evaluation limited      ABDOMINAL WALL/INGUINAL REGIONS:  Post surgical changes seen in the ventral pelvic wall without evidence of localized abdominal wall collection      OSSEOUS STRUCTURES:  No acute fracture or destructive osseous lesion  There is moderately advanced discogenic disease seen at L5-S1      IMPRESSION:  History as above  Along the anterior lower uterine segment the myometrium is indistinguishable  Hematoma seen anterior to the anterior uterus blends indistinguishably with blood within the endometrial canal (series 602 image 82)  The possibility of   focal uterine dehiscence/perforation cannot be excluded  Gynecologic consultation and further follow-up is advised      Endometrial cavity is diffusely distended with complex fluid suggesting hemorrhage with some hemorrhagic fluid seen along the endocervical canal as well as the  scar  Small amount of seroma/hematoma in the space of Retzius as well as small volume simple and hemorrhagic free pelvic fluid  No evidence of abscess  Otherwise, no acute intra-abdominal abnormality  The study was marked in [facility] for immediate notification  Imaging Studies: I have personally reviewed pertinent reports        EKG, Pathology, and Other Studies: I have personally reviewed pertinent reports  Assessment/Plan     Assessment:  Luz Elena Diallo is a 27 y o  [id number] female POD 15 s/p RLTC and BTL presenting with vaginal bleeding and lower abdominal pain  It is possible her cervix was not dilated at time of delivery as she did not labor; could explain why she had no bleeding post op until now; cervix may be opening up a bit although still feels largely closed on exam   Bleeding appears stable, no active bleeding seen on exam  Vitals are stable and patient does not have an acute abdomen  CT findings "cannot rule out focal dehiscence" on the lower segment of the uterus as evidenced by thinning of myometrium/"indistinguishable myometrium" in this area  However this correlates to thin lower uterine segment with window noted on operative note at time of ; lower suspicion of uterine dehiscence/rupture given stable vitals, non-acute abdomen, and no evidence of large free fluid in the abdomen  Plan:  - Discharge home with expectant management of bleeding  - Continue other routine post op care   - Continue antibiotic for UTI diagnosed prior to admission  - F/u next week in [address] office; will notify clinic to call patient     Dr Сергей Parada was present for examination and counseling       Code Status: Prior      Dayana Putnam MD  2022  9:32 PM

## 2022-04-25 ENCOUNTER — TELEPHONE (OUTPATIENT)
Dept: OBGYN CLINIC | Facility: CLINIC | Age: 32
End: 2022-04-25

## 2022-04-25 NOTE — TELEPHONE ENCOUNTER
Called pt to check on pt, to see how the pt is doing since being in the ER 2 days ago  Pt stated she is doing better and scheduled a f/u appt with Jenn on 5/3/22

## 2022-05-19 ENCOUNTER — OFFICE VISIT (OUTPATIENT)
Dept: OBGYN CLINIC | Facility: CLINIC | Age: 32
End: 2022-05-19

## 2022-05-19 VITALS
HEIGHT: 64 IN | HEART RATE: 80 BPM | SYSTOLIC BLOOD PRESSURE: 131 MMHG | BODY MASS INDEX: 25.61 KG/M2 | WEIGHT: 150 LBS | DIASTOLIC BLOOD PRESSURE: 82 MMHG

## 2022-05-19 DIAGNOSIS — N30.00 ACUTE CYSTITIS WITHOUT HEMATURIA: ICD-10-CM

## 2022-05-19 DIAGNOSIS — N93.9 ABNORMAL UTERINE BLEEDING: Primary | ICD-10-CM

## 2022-05-19 PROCEDURE — 87086 URINE CULTURE/COLONY COUNT: CPT | Performed by: OBSTETRICS & GYNECOLOGY

## 2022-05-19 PROCEDURE — 99213 OFFICE O/P EST LOW 20 MIN: CPT | Performed by: OBSTETRICS & GYNECOLOGY

## 2022-05-19 RX ORDER — TRANEXAMIC ACID 650 1/1
650 TABLET ORAL 2 TIMES DAILY
Qty: 10 TABLET | Refills: 1 | Status: SHIPPED | OUTPATIENT
Start: 2022-05-19 | End: 2022-05-24

## 2022-05-20 PROBLEM — N93.9 ABNORMAL UTERINE BLEEDING: Status: ACTIVE | Noted: 2022-05-20

## 2022-05-20 PROBLEM — N30.00 ACUTE CYSTITIS WITHOUT HEMATURIA: Status: ACTIVE | Noted: 2022-05-20

## 2022-05-20 NOTE — ASSESSMENT & PLAN NOTE
- patient is presenting with status post  section irregular bleeding   - patient is currently breastfeeding  - patient is CT abdomen pelvis when she presented to emergency with vaginal bleeding on 2022, which was showing blood clots and unable to differentiate uterine incisional dehiscence  - patient is ambulating able to do daily chores bleeding not present at the moment  - I have discussed with the patient possible causes as she may have an early menstrual cycle after delivery, irregular bleeding related with hormonal changes during breastfeeding, uterine dehiscence with complete normal physical exam is unlikely  - plan:  Tranexamic acid if bleeding recur for 5 days, RTC for follow up 6 weeks  We may start OCP or progestin for abnormal uterine bleeding if it persists  Patient had tubal sterilization for contraception

## 2022-05-20 NOTE — PROGRESS NOTES
[address] 27 y o  SUBJECTIVE    CC:  "vaginal bleeding"    HPI: Quita Márquez is a 27 y o  [id number] female presenting today for acute office visit vaginal bleeding in the setting of status post  section  The bleeding is not present at the moment, she had bleeding after 1 month of  section, she admitted to emergency and had CT imaging  Patient had also urinary tract infection that time but she did not use any antibiotics  Patient is still having some urinary urgency and frequency  She decline constipation, diarrhea  She is currently breastfeeding and she had tubal sterilization  The following portions of the patient's history were reviewed and updated as appropriate: allergies, current medications, past family history, past medical history, past social history, past surgical history and problem list         ROS  General: negative for chills or fever   Respiratory: no cough, shortness of breath, or wheezing  Cardiovascular: no chest pain or dyspnea on exertion  Gastrointestinal: no abdominal pain, change in bowel habits, or black or bloody stools  Urinary: no urinary symptoms  Genitourinary: Abn Vaginal Bleeding  Neurological: no TIA or stroke symptoms      OBJECTIVE  Vitals:    22 1542   BP: 131/82   BP Location: Right arm   Patient Position: Sitting   Cuff Size: Adult   Pulse: 80   Weight: 68 kg (150 lb)   Height: 5' 4" (1 626 m)       General appearance: alert and oriented, in no acute distress  Genitourinary exam: not done      Lab Results:   No visits with results within 1 Day(s) from this visit     Latest known visit with results is:   Admission on 2022, Discharged on 2022   Component Date Value    WBC 2022 7 18     RBC 2022 3 80 (A)    Hemoglobin 2022 10 3 (A)    Hematocrit 2022 32 6 (A)    MCV 2022 86     MCH 2022 27 1     MCHC 2022 31 6     RDW 04/22/2022 14 1     MPV 04/22/2022 10 4     Platelets 30/87/5815 319     nRBC 04/22/2022 0     Neutrophils Relative 04/22/2022 61     Immat GRANS % 04/22/2022 0     Lymphocytes Relative 04/22/2022 27     Monocytes Relative 04/22/2022 6     Eosinophils Relative 04/22/2022 5     Basophils Relative 04/22/2022 1     Neutrophils Absolute 04/22/2022 4 28     Immature Grans Absolute 04/22/2022 0 03     Lymphocytes Absolute 04/22/2022 1 97     Monocytes Absolute 04/22/2022 0 45     Eosinophils Absolute 04/22/2022 0 38     Basophils Absolute 04/22/2022 0 07     Sodium 04/22/2022 140     Potassium 04/22/2022 4 2     Chloride 04/22/2022 105     CO2 04/22/2022 26     ANION GAP 04/22/2022 9     BUN 04/22/2022 11     Creatinine 04/22/2022 0 64     Glucose 04/22/2022 91     Calcium 04/22/2022 8 3     Corrected Calcium 04/22/2022 8 9     AST 04/22/2022 18     ALT 04/22/2022 26     Alkaline Phosphatase 04/22/2022 102     Total Protein 04/22/2022 6 7     Albumin 04/22/2022 3 2 (A)    Total Bilirubin 04/22/2022 0 23     eGFR 04/22/2022 116     Color, UA 04/22/2022 Yellow     Clarity, UA 04/22/2022 Clear     Specific Gravity, UA 04/22/2022 <=1 005     pH, UA 04/22/2022 6 5     Leukocytes, UA 04/22/2022 Trace (A)    Nitrite, UA 04/22/2022 Negative     Protein, UA 04/22/2022 Trace (A)    Glucose, UA 04/22/2022 Negative     Ketones, UA 04/22/2022 Negative     Urobilinogen, UA 04/22/2022 0 2     Bilirubin, UA 04/22/2022 Negative     Blood, UA 04/22/2022 Large (A)    ABO Grouping 04/22/2022 A     Rh Factor 04/22/2022 Positive     Antibody Screen 04/22/2022 Negative     Specimen Expiration Date 04/22/2022 [id number]     EXT PREG TEST UR (Ref: N* 04/22/2022 negative     Control 04/22/2022 valid     RBC, UA 04/22/2022 20-30 (A)    WBC, UA 04/22/2022 1-2     Epithelial Cells 04/22/2022 Occasional     Bacteria, UA 04/22/2022 Innumerable (A)              ASSESSMENT&PLAN  Acute cystitis without hematuria  - patient is reporting urinary urgency, frequency in the setting recent urinary tract infection  Patient did not received any antibiotics until  Empiric Macrobid started urine culture has been sent  Antibiotics will be tailored after antibiogram resulted    Abnormal uterine bleeding  - patient is presenting with status post  section irregular bleeding   - patient is currently breastfeeding  - patient is CT abdomen pelvis when she presented to emergency with vaginal bleeding on 2022, which was showing blood clots and unable to differentiate uterine incisional dehiscence  - patient is ambulating able to do daily chores bleeding not present at the moment  - I have discussed with the patient possible causes as she may have an early menstrual cycle after delivery, irregular bleeding related with hormonal changes during breastfeeding, uterine dehiscence with complete normal physical exam is unlikely  - plan:  Tranexamic acid if bleeding recur for 5 days, RTC for follow up 6 weeks  We may start OCP or progestin for abnormal uterine bleeding if it persists  Patient had tubal sterilization for contraception  Greater than 50% of total time was spent with the patient and / or family counseling and / or coordination of care  All questions were answered &  expressed understanding      Patient was seen and discussed with Dr Rafi Allison MD  OBGYN PGY-4  2022

## 2022-05-20 NOTE — ASSESSMENT & PLAN NOTE
- patient is reporting urinary urgency, frequency in the setting recent urinary tract infection  Patient did not received any antibiotics until  Empiric Macrobid started urine culture has been sent    Antibiotics will be tailored after antibiogram resulted

## 2022-05-21 LAB — BACTERIA UR CULT: NORMAL

## 2022-06-29 NOTE — PROGRESS NOTES
Assessment/Plan:    No problem-specific Assessment & Plan notes found for this encounter  RTO 2022 for annual      Diagnoses and all orders for this visit:    S/P repeat low transverse     Language barrier  -     Ambulatory referral to social work care management program; Future          Subjective:      Patient ID: Ben Vargas is a 27 y o  female  HPI  26 yo  patient was last seen 2022 in the office and was treated for UTI  She is accompanied by a family member and her infant son " Srikanth Kelley"  Bulgarian interpretation done by Kirt Pabon  She also had abnormal uterine bleeding when she presented to the emergency room with vaginal bleeding on 2022, she had a CT scan  which showed blood clots and unable to differentiate uterine incisional dehiscence  She was to use TXA if bleeding for 5 days if bleeding reoccurred , this is her 6 week follow-up, plan was to either start OCPs or progesterone for abnormal uterine bleeding if persist   Patient had a tubal sterilization for contraception 2022 with her   She reports that her bleeding has stopped and denies any abdominal pain  She reports a normal menses 2022  Did not fill TXA  She is breast feeding and denies concerns  She denies any problems with elimination  She denies any HA's, visual changes, SOB, chest pain or pain in extremities  She has history of LEEP procedure 11- 12 years ago and f/u paps were negative  Her last Pap and HPV was 2021 negative    The following portions of the patient's history were reviewed and updated as appropriate: allergies, current medications, past family history, past medical history, past social history, past surgical history and problem list     Review of Systems   Constitutional: Negative for chills and fever  Eyes: Negative for photophobia and visual disturbance  Respiratory: Negative  Cardiovascular: Negative      Gastrointestinal: Negative for abdominal pain, constipation, nausea and vomiting  Genitourinary: Negative for dysuria, menstrual problem, pelvic pain and vaginal discharge  Neurological: Negative for dizziness and headaches  Objective:      /82 (BP Location: Right arm, Patient Position: Sitting, Cuff Size: Adult)   Pulse 76   Ht 5' 4" (1 626 m)   Wt 67 8 kg (149 lb 6 4 oz)   LMP 06/07/2022 (Exact Date)   BMI 25 64 kg/m²          Physical Exam  Constitutional:       Appearance: Normal appearance  Cardiovascular:      Rate and Rhythm: Normal rate  Pulmonary:      Effort: Pulmonary effort is normal    Abdominal:      Palpations: Abdomen is soft  Tenderness: There is no abdominal tenderness  There is no rebound  Skin:     General: Skin is warm and dry  Neurological:      Mental Status: She is oriented to person, place, and time  Psychiatric:         Mood and Affect: Mood normal          Behavior: Behavior normal        Incision- well healed, no collections, non tender  With deep palpation reports very minor discomfort

## 2022-06-30 ENCOUNTER — OFFICE VISIT (OUTPATIENT)
Dept: OBGYN CLINIC | Facility: CLINIC | Age: 32
End: 2022-06-30

## 2022-06-30 VITALS
BODY MASS INDEX: 25.51 KG/M2 | WEIGHT: 149.4 LBS | HEIGHT: 64 IN | SYSTOLIC BLOOD PRESSURE: 124 MMHG | HEART RATE: 76 BPM | DIASTOLIC BLOOD PRESSURE: 82 MMHG

## 2022-06-30 DIAGNOSIS — Z98.891 S/P REPEAT LOW TRANSVERSE C-SECTION: Primary | ICD-10-CM

## 2022-06-30 DIAGNOSIS — Z78.9 LANGUAGE BARRIER: ICD-10-CM

## 2022-06-30 PROBLEM — O34.219 PREGNANCY WITH HISTORY OF CESAREAN SECTION, ANTEPARTUM: Status: RESOLVED | Noted: 2021-09-28 | Resolved: 2022-06-30

## 2022-06-30 PROBLEM — Z60.3 LANGUAGE BARRIER: Status: ACTIVE | Noted: 2022-06-30

## 2022-06-30 PROBLEM — Z75.8 LANGUAGE BARRIER: Status: ACTIVE | Noted: 2022-06-30

## 2022-06-30 PROCEDURE — 99213 OFFICE O/P EST LOW 20 MIN: CPT | Performed by: NURSE PRACTITIONER

## 2022-07-06 ENCOUNTER — PATIENT OUTREACH (OUTPATIENT)
Dept: OBGYN CLINIC | Facility: CLINIC | Age: 32
End: 2022-07-06

## 2022-07-06 NOTE — PROGRESS NOTES
Late Entry  JAVIER POTTER met with Pt and Provider during gyn visit on 6/30/22 to assist with interpretation  Pt with no complaints  Breast feeding successfully  Pt has good support from family  Pt to return to clinic in 5 weeks for annual  Pt will call [facility] as needed

## 2022-08-08 ENCOUNTER — TELEPHONE (OUTPATIENT)
Dept: OBGYN CLINIC | Facility: CLINIC | Age: 32
End: 2022-08-08

## 2022-08-08 NOTE — TELEPHONE ENCOUNTER
Called and informed pt that she had missed appt  Would like to see if she would like to r/s  Missed appt  Pt stated will call back when she finds time due to  recent employment

## 2022-10-11 PROBLEM — N30.00 ACUTE CYSTITIS WITHOUT HEMATURIA: Status: RESOLVED | Noted: 2022-05-20 | Resolved: 2022-10-11

## 2023-03-14 ENCOUNTER — HOSPITAL ENCOUNTER (OUTPATIENT)
Dept: RADIOLOGY | Facility: HOSPITAL | Age: 33
Discharge: HOME | End: 2023-03-14

## 2023-03-14 ENCOUNTER — OFFICE VISIT (OUTPATIENT)
Dept: FAMILY MEDICINE CLINIC | Facility: CLINIC | Age: 33
End: 2023-03-14

## 2023-03-14 ENCOUNTER — APPOINTMENT (OUTPATIENT)
Dept: LAB | Facility: CLINIC | Age: 33
End: 2023-03-14

## 2023-03-14 VITALS
WEIGHT: 139.6 LBS | TEMPERATURE: 97.8 F | HEIGHT: 64 IN | OXYGEN SATURATION: 98 % | HEART RATE: 91 BPM | DIASTOLIC BLOOD PRESSURE: 88 MMHG | BODY MASS INDEX: 23.83 KG/M2 | RESPIRATION RATE: 18 BRPM | SYSTOLIC BLOOD PRESSURE: 128 MMHG

## 2023-03-14 DIAGNOSIS — G89.29 CHRONIC LEFT SHOULDER PAIN: ICD-10-CM

## 2023-03-14 DIAGNOSIS — Z11.59 NEED FOR HEPATITIS C SCREENING TEST: ICD-10-CM

## 2023-03-14 DIAGNOSIS — M25.512 CHRONIC LEFT SHOULDER PAIN: ICD-10-CM

## 2023-03-14 DIAGNOSIS — Z76.89 ENCOUNTER TO ESTABLISH CARE: Primary | ICD-10-CM

## 2023-03-14 DIAGNOSIS — Z76.89 ENCOUNTER TO ESTABLISH CARE: ICD-10-CM

## 2023-03-14 PROBLEM — Z87.51 HISTORY OF PRETERM DELIVERY: Status: RESOLVED | Noted: 2021-09-28 | Resolved: 2023-03-14

## 2023-03-14 PROBLEM — Z3A.39 39 WEEKS GESTATION OF PREGNANCY: Status: RESOLVED | Noted: 2021-11-25 | Resolved: 2023-03-14

## 2023-03-14 PROBLEM — O09.293 HX OF PREECLAMPSIA, PRIOR PREGNANCY, CURRENTLY PREGNANT, THIRD TRIMESTER: Status: RESOLVED | Noted: 2021-09-28 | Resolved: 2023-03-14

## 2023-03-14 PROBLEM — Z30.9 CONTRACEPTION MANAGEMENT: Status: RESOLVED | Noted: 2022-02-22 | Resolved: 2023-03-14

## 2023-03-14 LAB
ALBUMIN SERPL BCP-MCNC: 4.3 G/DL (ref 3.5–5)
ALP SERPL-CCNC: 57 U/L (ref 46–116)
ALT SERPL W P-5'-P-CCNC: 39 U/L (ref 12–78)
ANION GAP SERPL CALCULATED.3IONS-SCNC: 2 MMOL/L (ref 4–13)
AST SERPL W P-5'-P-CCNC: 32 U/L (ref 5–45)
BASOPHILS # BLD AUTO: 0.04 THOUSANDS/ÂΜL (ref 0–0.1)
BASOPHILS NFR BLD AUTO: 1 % (ref 0–1)
BILIRUB SERPL-MCNC: 0.36 MG/DL (ref 0.2–1)
BUN SERPL-MCNC: 12 MG/DL (ref 5–25)
CALCIUM SERPL-MCNC: 9.2 MG/DL (ref 8.3–10.1)
CHLORIDE SERPL-SCNC: 106 MMOL/L (ref 96–108)
CO2 SERPL-SCNC: 29 MMOL/L (ref 21–32)
CREAT SERPL-MCNC: 0.51 MG/DL (ref 0.6–1.3)
EOSINOPHIL # BLD AUTO: 0.08 THOUSAND/ÂΜL (ref 0–0.61)
EOSINOPHIL NFR BLD AUTO: 2 % (ref 0–6)
ERYTHROCYTE [DISTWIDTH] IN BLOOD BY AUTOMATED COUNT: 13.4 % (ref 11.6–15.1)
GFR SERPL CREATININE-BSD FRML MDRD: 124 ML/MIN/1.73SQ M
GLUCOSE P FAST SERPL-MCNC: 96 MG/DL (ref 65–99)
HCT VFR BLD AUTO: 37.9 % (ref 34.8–46.1)
HGB BLD-MCNC: 12.4 G/DL (ref 11.5–15.4)
IMM GRANULOCYTES # BLD AUTO: 0.02 THOUSAND/UL (ref 0–0.2)
IMM GRANULOCYTES NFR BLD AUTO: 0 % (ref 0–2)
LYMPHOCYTES # BLD AUTO: 1.75 THOUSANDS/ÂΜL (ref 0.6–4.47)
LYMPHOCYTES NFR BLD AUTO: 36 % (ref 14–44)
MCH RBC QN AUTO: 28.3 PG (ref 26.8–34.3)
MCHC RBC AUTO-ENTMCNC: 32.7 G/DL (ref 31.4–37.4)
MCV RBC AUTO: 87 FL (ref 82–98)
MONOCYTES # BLD AUTO: 0.37 THOUSAND/ÂΜL (ref 0.17–1.22)
MONOCYTES NFR BLD AUTO: 8 % (ref 4–12)
NEUTROPHILS # BLD AUTO: 2.61 THOUSANDS/ÂΜL (ref 1.85–7.62)
NEUTS SEG NFR BLD AUTO: 53 % (ref 43–75)
NRBC BLD AUTO-RTO: 0 /100 WBCS
PLATELET # BLD AUTO: 249 THOUSANDS/UL (ref 149–390)
PMV BLD AUTO: 11.4 FL (ref 8.9–12.7)
POTASSIUM SERPL-SCNC: 3.5 MMOL/L (ref 3.5–5.3)
PROT SERPL-MCNC: 7.4 G/DL (ref 6.4–8.4)
RBC # BLD AUTO: 4.38 MILLION/UL (ref 3.81–5.12)
SODIUM SERPL-SCNC: 137 MMOL/L (ref 135–147)
WBC # BLD AUTO: 4.87 THOUSAND/UL (ref 4.31–10.16)

## 2023-03-14 RX ORDER — NAPROXEN 500 MG/1
500 TABLET ORAL 2 TIMES DAILY PRN
Qty: 60 TABLET | Refills: 1 | Status: SHIPPED | OUTPATIENT
Start: 2023-03-14

## 2023-03-14 NOTE — ASSESSMENT & PLAN NOTE
Left shoulder pain for at least 8 years without injury  TTP posteriorly, decreased ROM     - XR shoulder left  - Naproxen 500 mg BID PRN  - Referral for PT

## 2023-03-14 NOTE — PROGRESS NOTES
Name: Kadeem Kim      : 1990      MRN: [id number]  Encounter Provider: ERICKA Nieves  Encounter Date: 3/14/2023   Encounter department: [address] [place] [address]     1  Encounter to establish care  -     CBC and differential; Future  -     Comprehensive metabolic panel; Future    2  Chronic left shoulder pain  Assessment & Plan:  Left shoulder pain for at least 8 years without injury  TTP posteriorly, decreased ROM  - XR shoulder left  - Naproxen 500 mg BID PRN  - Referral for PT    Orders:  -     Ambulatory Referral to Physical Therapy; Future  -     naproxen (Naprosyn) 500 mg tablet; Take 1 tablet (500 mg total) by mouth 2 (two) times a day as needed for moderate pain  -     XR shoulder 2+ vw left; Future; Expected date: 2023    3  Need for hepatitis C screening test  -     Hepatitis C Antibody ([facility]); Future         Subjective     HPI     Stat[employer] language interpretation services were utilized for this visit  Neema presented to the office to establish care  Pt states she moved from [place] and is changing providers for that reason  Pt's main concern today is pain in left shoulder that has been ongoing for several years  Pt participated in PT about 8 years ago which was effective at the time  There was no initial injury, pt had a job that involved repetitive motion  Pt is treating with naproxen 440 mg once daily, Voltaren gel, and ice which help  Pt rates the pain at 6/10 at rest, may reach 10/10 with activity  Pain radiates toward neck at times, will have some numbness/tingling in left arm when pain is more severe  Has never had an XR or other imaging  Review of Systems   Constitutional: Negative for fatigue, fever and unexpected weight change  Eyes: Negative for visual disturbance  Respiratory: Negative for cough, chest tightness, shortness of breath and wheezing      Cardiovascular: Negative for chest pain, palpitations and leg swelling  Gastrointestinal: Negative for abdominal pain, constipation, diarrhea, nausea and vomiting  Genitourinary: Negative for dysuria  Musculoskeletal: Positive for arthralgias (left shoulder)  Neurological: Positive for numbness and headaches  Negative for dizziness, weakness and light-headedness  Psychiatric/Behavioral: Negative for dysphoric mood  The patient is not nervous/anxious  All other systems reviewed and are negative  Past Medical History:   Diagnosis Date   • History of  delivery 2021    At about 30 weeks, secondary to vaginal bleeding per patient   Per patient, she went into  labor   • Hx of preeclampsia, prior pregnancy, currently pregnant, third trimester 2021    Needs to complete prenatal panel and baseline preeclamptic labs Started on 162 mg aspirin daily   • Hypertension     hx of pre eclampsia x 2   • Kidney stone 2017   • Migraine     blurry vision   • Migraines    • Varicella     vaccinated per pt      Past Surgical History:   Procedure Laterality Date   •  SECTION      x3, last in    • COLPOSCOPY     • MO  DELIVERY ONLY N/A 2022    Procedure:  SECTION () REPEAT;  Surgeon: Estrella Ang MD;  Location: [facility] ;  Service: Obstetrics   • MO CONIZATION CERVIX W/WO D&C RPR KNIFE/LASER     • MO LIG/TRNSXJ FALOPIAN TUBE  DEL/ABDML SURG Bilateral 2022    Procedure: LIGATION/COAGULATION TUBAL;  Surgeon: Estrella Ang MD;  Location: [facility] ;  Service: Obstetrics     Family History   Problem Relation Age of Onset   • No Known Problems Mother    • Heart disease Father    • Hypertension Father    • No Known Problems Sister    • No Known Problems Brother    • Asthma Daughter    • Hypertension Paternal Grandmother    • Cancer Paternal Grandfather    • No Known Problems Daughter      Social History     Socioeconomic History   • Marital status: /Civil Union     Spouse name: None   • Number of children: 3   • Years of education: None   • Highest education level: None   Occupational History   • None   Tobacco Use   • Smoking status: Never   • Smokeless tobacco: Never   Vaping Use   • Vaping Use: Never used   Substance and Sexual Activity   • Alcohol use: Not Currently   • Drug use: Never   • Sexual activity: Yes     Partners: Male     Birth control/protection: None   Other Topics Concern   • None   Social History Narrative   • None     Social Determinants of Health     Financial Resource Strain: Low Risk    • Difficulty of Paying Living Expenses: Not hard at all   Food Insecurity: Unknown   • Worried About Running Out of Food in the Last Year: Never true   • Ran Out of Food in the Last Year: Not on file   Transportation Needs: No Transportation Needs   • Lack of Transportation (Medical): No   • Lack of Transportation (Non-Medical): No   Physical Activity: Not on file   Stress: Not on file   Social Connections: Not on file   Intimate Partner Violence: Not on file   Housing Stability: Not on file     No current outpatient medications on file prior to visit  No Known Allergies  Immunization History   Administered Date(s) Administered   • Tdap 02/04/2022       Objective     /88 (BP Location: Right arm, Patient Position: Sitting, Cuff Size: Standard)   Pulse 91   Temp 97 8 °F (36 6 °C) (Temporal)   Resp 18   Ht 5' 4" (1 626 m)   Wt 63 3 kg (139 lb 9 6 oz)   LMP 02/28/2023   SpO2 98%   BMI 23 96 kg/m²     Physical Exam  Vitals reviewed  Constitutional:       General: She is not in acute distress  Appearance: She is normal weight  She is not ill-appearing or diaphoretic  HENT:      Head: Normocephalic and atraumatic  Neck:      Thyroid: No thyromegaly or thyroid tenderness  Cardiovascular:      Rate and Rhythm: Normal rate and regular rhythm  Pulses: Normal pulses  Heart sounds: Normal heart sounds  No murmur heard    Pulmonary:      Effort: Pulmonary effort is normal  No tachypnea  Breath sounds: Normal breath sounds  No decreased breath sounds or wheezing  Musculoskeletal:      Right shoulder: Normal       Left shoulder: Tenderness present  No swelling, deformity or crepitus  Decreased range of motion  Normal strength  Right elbow: Normal       Left elbow: Normal       Cervical back: Neck supple  Tenderness present  Thoracic back: Normal       Right lower leg: No edema  Left lower leg: No edema  Lymphadenopathy:      Cervical: No cervical adenopathy  Skin:     General: Skin is warm and dry  Neurological:      Mental Status: She is alert and oriented to person, place, and time  Motor: Motor function is intact  No weakness  Gait: Gait is intact     Psychiatric:         Attention and Perception: Attention normal          Mood and Affect: Mood and affect normal        ERICKA Romano

## 2023-03-15 ENCOUNTER — APPOINTMENT (OUTPATIENT)
Dept: LAB | Facility: HOSPITAL | Age: 33
End: 2023-03-15

## 2023-03-28 ENCOUNTER — EVALUATION (OUTPATIENT)
Dept: PHYSICAL THERAPY | Facility: CLINIC | Age: 33
End: 2023-03-28

## 2023-03-28 DIAGNOSIS — M25.512 CHRONIC LEFT SHOULDER PAIN: ICD-10-CM

## 2023-03-28 DIAGNOSIS — G89.29 CHRONIC LEFT SHOULDER PAIN: ICD-10-CM

## 2023-03-28 NOTE — PROGRESS NOTES
PT Evaluation     Today's date: 3/28/2023  Patient name: Jigna Roth  : 1990  MRN: [id number]  Referring provider: ERICKA Mcdaniel  Dx:   Encounter Diagnosis     ICD-10-CM    1  Chronic left shoulder pain  M25 [id number] Ambulatory Referral to Physical Therapy    G89 [id number]           Start Time: 1610  Stop Time: 1700  Total time in clinic (min): 50 minutes    Assessment  Assessment details: Pt is a 26y o  year old female presenting to physical therapy for Chronic left shoulder pain  She presents with the following impairments: decreased L shoulder flexion and abduction ROM, decreased L shoulder strength, TTP of L UT, + impingement testing, and + mild RTC tendon strain testing affecting her function with carrying her child, working, lifting groceries, reaching overhead, showering, and reaching behind the back  Pt will benefit from skilled physical therapy to address functional limitations noted in evaluation and meet patient goals  Impairments: abnormal muscle firing, abnormal or restricted ROM, activity intolerance, impaired physical strength, lacks appropriate home exercise program, pain with function and poor body mechanics    Symptom irritability: moderateBarriers to therapy: Yoruba speaking  Understanding of Dx/Px/POC: good   Prognosis: good    Goals  ST  Pt will be independent with HEP  2  Pt will have 2/10 pain at rest   3  Pt will improve L shoulder abduction AROM by 20 degrees to improve overhead ability  LT  Pt will improve L shoulder flexion and abduction strength to 4+/5 to improve ability to carry child  2  Pt will improve L shoulder ER strength to 4+/5 improve ability to carry groceries  3  Pt will demonstrate L shoulder flexion and abduction ROM WNL to improve overhead reaching ability  Plan  Plan details: Pt requested to come one time a week to better fit her schedule    Patient would benefit from: PT eval and skilled physical therapy  Planned modality interventions: biofeedback, manual electrical stimulation, microcurrent electrical stimulation, TENS, electrical stimulation/Russian stimulation, thermotherapy: hydrocollator packs, cryotherapy and unattended electrical stimulation  Planned therapy interventions: abdominal trunk stabilization, joint mobilization, manual therapy, massage, ADL retraining, neuromuscular re-education, body mechanics training, patient education, postural training, strengthening, stretching, therapeutic activities, therapeutic exercise, flexibility, functional ROM exercises and home exercise program  Frequency: 1x week  Duration in weeks: 6  Treatment plan discussed with: patient        Subjective Evaluation    History of Present Illness  Mechanism of injury: Pt presents to the clinic with history of L shoulder pain that started over a year ago without injury  Pt stated that she had a child a year ago and the pain started after she was carrying him around for a while  The pt stated that she has pain that radiates down the arm and feels sharp  Pt stated that she is not currently working right now because she had a job but it involved a lot of shoulder motion and the pain in the shoulder forced her to quit  The pt stated that the pain has not changed since it has started  Pt stated that she is currently taking naproxen for the pain that does help  Pain  Current pain ratin  At best pain ratin  At worst pain rating: 10    Patient Goals  Patient goals for therapy: decreased pain, increased motion, increased strength, independence with ADLs/IADLs, return to sport/leisure activities and return to work          Objective     Palpation   Left   No palpable tenderness to the levator scapulae  Tenderness of the upper trapezius  Right   No palpable tenderness to the levator scapulae and upper trapezius  Tenderness     Left Shoulder   Tenderness in the [facility] joint and supraspinatus tendon   No tenderness in the acromion and biceps "tendon (proximal)  Right Shoulder  No tenderness in the [facility] joint, acromion, biceps tendon (proximal) and supraspinatus tendon  Cervical/Thoracic Screen   Cervical range of motion within normal limits    Active Range of Motion   Left Shoulder   Flexion: 150 degrees with pain  Abduction: 125 degrees with pain  External rotation 45°: 75 degrees   Internal rotation 45°: 90 degrees     Passive Range of Motion   Left Shoulder   Flexion: 170 degrees   Abduction: 130 degrees with pain    Strength/Myotome Testing     Left Shoulder     Planes of Motion   Flexion: 3+   Abduction: 3+   External rotation at 0°: 3+   Internal rotation at 0°: 4     Right Shoulder     Planes of Motion   Flexion: 4+   Abduction: 4+   External rotation at 0°: 4+   Internal rotation at 0°: 5     Tests     Left Shoulder   Positive empty can, full can, Hawkin's, Neer's and painful arc  Negative apprehension, drop arm and ULTT1  Right Shoulder   Positive empty can  Negative apprehension, drop arm, full can, Hawkin's, Neer's, painful arc and ULTT1         Flowsheet Rows    Flowsheet Row Most Recent Value   PT/OT G-Codes    Current Score 42   Projected Score 66             Precautions: Filipino Speaking    Date 3/28            Visit # IE            FOTO 42/66             Re-eval IE              Manuals 3/28            L shoulder PROM                                                    Neuro Re-Ed 3/28            No monies 10x GTB            scap punches             Rhythmic stabs             Body blade             Ball circles             scap clocks             PNF D2             Ther Ex 3/28            UBE             UT stretch 3x15\" for L            ER/IR 10x GTB            Rows/exts 10x GTB            Wall slide abd 3x10\"            Shoulder raises                                       Ther Activity 3/28                                      Gait Training                                       Modalities 3/28                                 "

## 2023-07-27 ENCOUNTER — OFFICE VISIT (OUTPATIENT)
Dept: OBGYN CLINIC | Facility: CLINIC | Age: 33
End: 2023-07-27

## 2023-07-27 VITALS
HEIGHT: 64 IN | WEIGHT: 137 LBS | DIASTOLIC BLOOD PRESSURE: 80 MMHG | BODY MASS INDEX: 23.39 KG/M2 | SYSTOLIC BLOOD PRESSURE: 127 MMHG | HEART RATE: 67 BPM

## 2023-07-27 DIAGNOSIS — R10.2 PELVIC PAIN: ICD-10-CM

## 2023-07-27 DIAGNOSIS — K59.00 CONSTIPATION, UNSPECIFIED CONSTIPATION TYPE: Primary | ICD-10-CM

## 2023-07-27 DIAGNOSIS — B96.89 BACTERIAL VAGINOSIS: ICD-10-CM

## 2023-07-27 DIAGNOSIS — N76.0 BACTERIAL VAGINOSIS: ICD-10-CM

## 2023-07-27 LAB
BV WHIFF TEST VAG QL: NEGATIVE
CLUE CELLS SPEC QL WET PREP: PRESENT
PH SMN: 4.5 [PH]
T VAGINALIS VAG QL WET PREP: PRESENT
YEAST VAG QL WET PREP: NEGATIVE

## 2023-07-27 PROCEDURE — 87491 CHLMYD TRACH DNA AMP PROBE: CPT

## 2023-07-27 PROCEDURE — 87591 N.GONORRHOEAE DNA AMP PROB: CPT

## 2023-07-27 PROCEDURE — 99213 OFFICE O/P EST LOW 20 MIN: CPT | Performed by: OBSTETRICS & GYNECOLOGY

## 2023-07-27 PROCEDURE — 87210 SMEAR WET MOUNT SALINE/INK: CPT | Performed by: OBSTETRICS & GYNECOLOGY

## 2023-07-27 RX ORDER — POLYETHYLENE GLYCOL 3350 17 G/17G
17 POWDER, FOR SOLUTION ORAL DAILY
Qty: 30 EACH | Refills: 0 | Status: SHIPPED | OUTPATIENT
Start: 2023-07-27

## 2023-07-27 RX ORDER — METRONIDAZOLE 500 MG/1
500 TABLET ORAL EVERY 12 HOURS SCHEDULED
Qty: 14 TABLET | Refills: 0 | Status: SHIPPED | OUTPATIENT
Start: 2023-07-27 | End: 2023-08-03

## 2023-07-27 RX ORDER — IBUPROFEN 600 MG/1
600 TABLET ORAL EVERY 6 HOURS PRN
Qty: 30 TABLET | Refills: 0 | Status: SHIPPED | OUTPATIENT
Start: 2023-07-27

## 2023-07-27 RX ORDER — SENNOSIDES 8.6 MG
650 CAPSULE ORAL EVERY 8 HOURS PRN
Qty: 30 TABLET | Refills: 0 | Status: SHIPPED | OUTPATIENT
Start: 2023-07-27

## 2023-07-27 NOTE — PROGRESS NOTES
OB/GYN VISIT  Neema Trimble    Subjective:     Linnea Bey is a 26 y.o. [id number] female who presents for pelvic pain. Neema reports that her pain began approximately 1 month ago. She states that her pain is intermittent in nature. She reports the pain is a dull pain that is located diffusely in her lower abdomen. She reports that the pain is worse during menses and intercourse. She has not tried taking any medication to help with the pain. She denies a history of STI. Neema reports that her cycles are regular. She does endorse that they are heavier since she had her tubal ligation. She denies any associated urinary symptoms including dysuria, urgency or frequency. She does endorse some constipation. Menstrual History:  OB History        3    Para   3    Term   2       1    AB   0    Living   3       SAB   0    IAB   0    Ectopic   0    Multiple   0    Live Births   3                Patient's last menstrual period was 2023. Past Medical History:   Diagnosis Date   • History of  delivery 2021    At about 30 weeks, secondary to vaginal bleeding per patient.  Per patient, she went into  labor   • Hx of preeclampsia, prior pregnancy, currently pregnant, third trimester 2021    Needs to complete prenatal panel and baseline preeclamptic labs Started on 162 mg aspirin daily   • Hypertension     hx of pre eclampsia x 2   • Kidney stone 2017   • Migraine     blurry vision   • Migraines    • Varicella     vaccinated per pt      Past Surgical History:   Procedure Laterality Date   •  SECTION      x3, last in    • COLPOSCOPY     • MI  DELIVERY ONLY N/A 2022    Procedure:  SECTION () REPEAT;  Surgeon: Lisset Early MD;  Location: [facility] ;  Service: Obstetrics   • MI CONIZATION CERVIX W/WO D&C RPR KNIFE/LASER     • MI LIG/TRNSXJ FALOPIAN TUBE  DEL/ABDML SURG Bilateral 04/07/2022    Procedure: LIGATION/COAGULATION TUBAL;  Surgeon: Donna Weber MD;  Location: [facility] ;  Service: Obstetrics       Objective:    Vitals: Blood pressure 127/80, pulse 67, height 5' 4" (1.626 m), weight 62.1 kg (137 lb), last menstrual period 07/05/2023, not currently breastfeeding. Body mass index is 23.52 kg/m². Physical Exam  Constitutional:       General: She is not in acute distress. Appearance: Normal appearance. Cardiovascular:      Rate and Rhythm: Normal rate. Pulmonary:      Effort: Pulmonary effort is normal.   Abdominal:      Palpations: Abdomen is soft. Tenderness: There is no abdominal tenderness. Genitourinary:     Labia:         Right: No rash, tenderness or lesion. Left: No rash, tenderness or lesion. Vagina: Vaginal discharge present. No bleeding. Cervix: No lesion. Uterus: Not tender. Adnexa:         Right: Tenderness present. No mass or fullness. Left: Tenderness present. No mass or fullness. Skin:     General: Skin is warm and dry. Neurological:      General: No focal deficit present. Mental Status: She is alert.    Psychiatric:         Mood and Affect: Mood normal.           Assessment/Plan:  Problem List Items Addressed This Visit        Genitourinary    Bacterial vaginosis     Microscopy and exam consistent with bacterial vaginosis   Rx for flagyl 500 mg BID x 7 days sent to pharmacy          Relevant Medications    metroNIDAZOLE (FLAGYL) 500 mg tablet       Other    Constipation - Primary     Discussed with patient that constipation may be contributing to symptoms   Rx for miralax          Relevant Medications    polyethylene glycol (MIRALAX) 17 g packet    Pelvic pain     Discussed with patient multiple etiologies of pelvic pain including infectious and structural causes such as fibroids and ovarian cysts   Rx for flagyl sent today for bacterial vaginosis   F/u Gonorrhea and Chlamydia  Pelvic US ordered   Rx for Tylenol/Motrin for pain management while awaiting results of workup  Rx for miralax as constipation may be contributing to symptoms   RTO 2-4 weeks to discuss results         Relevant Medications    acetaminophen (TYLENOL) 650 mg CR tablet    ibuprofen (MOTRIN) 600 mg tablet    Other Relevant Orders    POCT wet mount (Completed)    Chlamydia/GC amplified DNA by PCR (Completed)    US abdomen and pelvis with transvaginal       D/w Dr. Christine Diaz MD

## 2023-07-28 LAB
C TRACH DNA SPEC QL NAA+PROBE: NEGATIVE
N GONORRHOEA DNA SPEC QL NAA+PROBE: NEGATIVE

## 2023-07-29 PROBLEM — K59.00 CONSTIPATION: Status: ACTIVE | Noted: 2023-07-29

## 2023-07-29 PROBLEM — R10.2 PELVIC PAIN: Status: ACTIVE | Noted: 2023-07-29

## 2023-07-29 PROBLEM — N76.0 BACTERIAL VAGINOSIS: Status: ACTIVE | Noted: 2023-07-29

## 2023-07-29 PROBLEM — B96.89 BACTERIAL VAGINOSIS: Status: ACTIVE | Noted: 2023-07-29

## 2023-07-30 NOTE — ASSESSMENT & PLAN NOTE
Microscopy and exam consistent with bacterial vaginosis   Rx for flagyl 500 mg BID x 7 days sent to pharmacy

## 2023-07-30 NOTE — ASSESSMENT & PLAN NOTE
Discussed with patient multiple etiologies of pelvic pain including infectious and structural causes such as fibroids and ovarian cysts   Rx for flagyl sent today for bacterial vaginosis   F/u Gonorrhea and Chlamydia  Pelvic US ordered   Rx for Tylenol/Motrin for pain management while awaiting results of workup  Rx for miralax as constipation may be contributing to symptoms   RTO 2-4 weeks to discuss results

## 2025-03-13 ENCOUNTER — TELEPHONE (OUTPATIENT)
Dept: FAMILY MEDICINE CLINIC | Facility: CLINIC | Age: 35
End: 2025-03-13

## 2025-03-14 NOTE — TELEPHONE ENCOUNTER
second attempt to contact patient. Pt stated she is currently living in [place]. Please remove pcp. Thanks!

## (undated) DEVICE — SUT MONOCRYL 0 CTX 36 IN Y398H

## (undated) DEVICE — PACK C-SECTION PBDS

## (undated) DEVICE — MEDI-VAC YANKAUER SUCTION HANDLE W/STRAIGHT TIP & CONTROL VENT: Brand: CARDINAL HEALTH

## (undated) DEVICE — CHLORAPREP HI-LITE 26ML ORANGE

## (undated) DEVICE — Device

## (undated) DEVICE — ABDOMINAL PAD: Brand: DERMACEA

## (undated) DEVICE — SKIN MARKER DUAL TIP WITH RULER CAP, FLEXIBLE RULER AND LABELS: Brand: DEVON

## (undated) DEVICE — GLOVE SRG BIOGEL ECLIPSE 8

## (undated) DEVICE — SUT VICRYL 0 CT-1 27 IN J260H

## (undated) DEVICE — GAUZE SPONGES,16 PLY: Brand: CURITY

## (undated) DEVICE — TELFA NON-ADHERENT ABSORBENT DRESSING: Brand: TELFA

## (undated) DEVICE — SUT PLAIN 2-0 CTX 27 IN 872H

## (undated) DEVICE — SURGICEL NU-KNIT 3 X 4

## (undated) DEVICE — SUT VICRYL 4-0 KS 27 IN J662H